# Patient Record
Sex: FEMALE | Race: WHITE | NOT HISPANIC OR LATINO | Employment: FULL TIME | URBAN - METROPOLITAN AREA
[De-identification: names, ages, dates, MRNs, and addresses within clinical notes are randomized per-mention and may not be internally consistent; named-entity substitution may affect disease eponyms.]

---

## 2017-01-30 ENCOUNTER — OFFICE VISIT (OUTPATIENT)
Dept: FAMILY MEDICINE | Facility: CLINIC | Age: 52
End: 2017-01-30
Payer: COMMERCIAL

## 2017-01-30 VITALS
WEIGHT: 136 LBS | BODY MASS INDEX: 20.14 KG/M2 | HEART RATE: 76 BPM | HEIGHT: 69 IN | SYSTOLIC BLOOD PRESSURE: 94 MMHG | TEMPERATURE: 98 F | DIASTOLIC BLOOD PRESSURE: 62 MMHG | OXYGEN SATURATION: 96 %

## 2017-01-30 DIAGNOSIS — F32.5 MAJOR DEPRESSION IN COMPLETE REMISSION (H): Primary | ICD-10-CM

## 2017-01-30 DIAGNOSIS — F41.9 ANXIETY: ICD-10-CM

## 2017-01-30 DIAGNOSIS — F99 INSOMNIA DUE TO OTHER MENTAL DISORDER: ICD-10-CM

## 2017-01-30 DIAGNOSIS — F51.05 INSOMNIA DUE TO OTHER MENTAL DISORDER: ICD-10-CM

## 2017-01-30 PROCEDURE — 99213 OFFICE O/P EST LOW 20 MIN: CPT | Performed by: FAMILY MEDICINE

## 2017-01-30 RX ORDER — CLONAZEPAM 0.5 MG/1
0.5 TABLET ORAL
Qty: 30 TABLET | Refills: 0 | Status: SHIPPED | OUTPATIENT
Start: 2017-01-30 | End: 2017-03-08

## 2017-01-30 RX ORDER — SERTRALINE HYDROCHLORIDE 100 MG/1
100 TABLET, FILM COATED ORAL DAILY
Qty: 90 TABLET | Refills: 1 | Status: SHIPPED | OUTPATIENT
Start: 2017-01-30 | End: 2017-08-18

## 2017-01-30 NOTE — NURSING NOTE
"Chief Complaint   Patient presents with     Sleep Problem     Depression     Health Maintenance     jono,phq       Initial BP 94/62 mmHg  Pulse 76  Temp(Src) 98  F (36.7  C) (Oral)  Ht 5' 9\" (1.753 m)  Wt 136 lb (61.689 kg)  BMI 20.07 kg/m2  SpO2 96%  LMP 08/13/2014  Breastfeeding? No Estimated body mass index is 20.07 kg/(m^2) as calculated from the following:    Height as of this encounter: 5' 9\" (1.753 m).    Weight as of this encounter: 136 lb (61.689 kg).  BP completed using cuff size: regular  Alessandra Bess CMA       "

## 2017-01-30 NOTE — PROGRESS NOTES
"  SUBJECTIVE:                                                    Sharita Joseph is a 51 year old female who presents to clinic today for the following health issues:    Depression and Anxiety Follow-Up    Status since last visit: Worsened More issues with sleep    Other associated symptoms:None    Complicating factors:     Significant life event: No     Current substance abuse: None    PHQ-9 SCORE 11/9/2015 5/27/2016 11/18/2016   Total Score - - -   Total Score 2 0 9     LUDIN-7 SCORE 2/12/2015 2/23/2015 11/18/2016   Total Score 11 6 -   Total Score - - 18        PHQ-9  English      PHQ-9   Any Language     GAD7         Amount of exercise or physical activity: 6-7 days/week for an average of 45-60 minutes    Problems taking medications regularly: No    Medication side effects: none    Diet: carbohydrate counting and gluten-free/reduced    She went up on on her zoloft to 75 mg daily. Somewhat helps.     Life is very imbalanced.   Working 7 days/ week.   Stress at work.   Unable to adjust her work schedule.   If she does not get good night sleep then her depression gets worse.   Anxiety makes hard to go to bed.   It affects her next day performance.     She started doing light therapy.   She is doing meditation and yoga.     Problem list and histories reviewed & adjusted, as indicated.  Additional history: as documented    BP Readings from Last 3 Encounters:   01/30/17 94/62   12/20/16 97/61   11/18/16 105/67    Wt Readings from Last 3 Encounters:   01/30/17 136 lb (61.689 kg)   11/18/16 134 lb (60.782 kg)   07/25/16 135 lb (61.236 kg)            Problem list, Medication list, Allergies, and Medical/Social/Surgical histories reviewed in EPIC and updated as appropriate.    ROS:  Constitutional, HEENT, cardiovascular, pulmonary, gi and gu systems are negative, except as otherwise noted.    OBJECTIVE:                                                    BP 94/62 mmHg  Pulse 76  Temp(Src) 98  F (36.7  C) (Oral)  Ht 5' 9\" " (1.753 m)  Wt 136 lb (61.689 kg)  BMI 20.07 kg/m2  SpO2 96%  LMP 08/13/2014  Breastfeeding? No  Body mass index is 20.07 kg/(m^2).  GENERAL: healthy, alert and no distress  PSYCH: alert, oriented, well groomed, normal thought process, mood euthymic, affect congruent to mood.     PHQ-9 SCORE 5/27/2016 11/18/2016 1/30/2017   Total Score - - -   Total Score 0 9 14          ASSESSMENT/PLAN:                                                        ICD-10-CM    1. Major depression in complete remission (H) F32.5 sertraline (ZOLOFT) 100 MG tablet   2. Anxiety F41.9 clonazePAM (KLONOPIN) 0.5 MG tablet   3. Insomnia due to other mental disorder F51.05 clonazePAM (KLONOPIN) 0.5 MG tablet    F99      Dose of zoloft increased to 100 mg daily.   Pt has needed Klonopin on as needed basis in the past. No concern about abuse or misuse of medication.   Short supply of Klonopin, pt will limit the use to 3-4 nights per week.   Continue meditation, yoga, light therapy.   F/u in 1 month for depression.     Arlette Ramirez MD  [facility]

## 2017-01-30 NOTE — MR AVS SNAPSHOT
"              After Visit Summary   1/30/2017    Sharita Joseph    MRN: [id number]           Patient Information     Date Of Birth          1965        Visit Information        Provider Department      1/30/2017 1:40 PM Arlette Ramirez MD [facility]        Today's Diagnoses     Major depression in complete remission (H)    -  1     Anxiety         Insomnia due to other mental disorder            Follow-ups after your visit        Who to contact     If you have questions or need follow up information about today's clinic visit or your schedule please contact [facility] directly at [phone].  Normal or non-critical lab and imaging results will be communicated to you by MyChart, letter or phone within 4 business days after the clinic has received the results. If you do not hear from us within 7 days, please contact the clinic through [employer] or phone. If you have a critical or abnormal lab result, we will notify you by phone as soon as possible.  Submit refill requests through [employer] or call your pharmacy and they will forward the refill request to us. Please allow 3 business days for your refill to be completed.          Additional Information About Your Visit        MyChart Information     [employer] gives you secure access to your electronic health record. If you see a primary care provider, you can also send messages to your care team and make appointments. If you have questions, please call your primary care clinic.  If you do not have a primary care provider, please call [phone] and they will assist you.        Care EveryWhere ID     This is your Care EveryWhere ID. This could be used by other organizations to access your [facility] medical records  [id number]        Your Vitals Were     Pulse Temperature Height BMI (Body Mass Index) Pulse Oximetry Last Period    76 98  F (36.7  C) (Oral) 5' 9\" (1.753 m) 20.07 kg/m2 96% 08/13/2014    " Breastfeeding?                   No            Blood Pressure from Last 3 Encounters:   01/30/17 94/62   12/20/16 97/61   11/18/16 105/67    Weight from Last 3 Encounters:   01/30/17 136 lb (61.689 kg)   11/18/16 134 lb (60.782 kg)   07/25/16 135 lb (61.236 kg)              Today, you had the following     No orders found for display         Today's Medication Changes          These changes are accurate as of: 1/30/17  2:10 PM.  If you have any questions, ask your nurse or doctor.               These medicines have changed or have updated prescriptions.        Dose/Directions    sertraline 100 MG tablet   Commonly known as:  ZOLOFT   This may have changed:    - medication strength  - how much to take   Used for:  Major depression in complete remission (H)   Changed by:  Arlette Ramirez MD        Dose:  100 mg   Take 1 tablet (100 mg) by mouth daily   Quantity:  90 tablet   Refills:  1            Where to get your medicines      These medications were sent to [id number] - [place], [place] - [address] AT [facility] [address]  [address], [place] [address]     Phone:  [phone]    - sertraline 100 MG tablet      Some of these will need a paper prescription and others can be bought over the counter.  Ask your nurse if you have questions.     Bring a paper prescription for each of these medications    - clonazePAM 0.5 MG tablet             Primary Care Provider Office Phone # Fax #    Arlette Ramirez [phone]       [facility] [address] [facility] [address]        Thank you!     Thank you for choosing [facility]  for your care. Our goal is always to provide you with excellent care. Hearing back from our patients is one way we can continue to improve our services. Please take a few minutes to complete the written survey that you may receive in the mail after your visit with us.  Thank you!             Your Updated Medication List - Protect others around you: Learn how to safely use, store and throw away your medicines at www.disposemymeds.org.          This list is accurate as of: 1/30/17  2:10 PM.  Always use your most recent med list.                   Brand Name Dispense Instructions for use    clindamycin 1 % solution    CLEOCIN T    60 mL    Apply topically 2 times daily       clonazePAM 0.5 MG tablet    klonoPIN    30 tablet    Take 1 tablet (0.5 mg) by mouth nightly as needed for anxiety       conjugated estrogens cream    PREMARIN     Place vaginally twice a week       doxycycline 100 MG capsule    VIBRAMYCIN    60 capsule    Take 1 capsule (100 mg) by mouth daily       magnesium 250 MG tablet     100 tablet    Take 1 tablet by mouth daily.       pimecrolimus 1 % cream    ELIDEL    60 g    Apply topically 2 times daily       sertraline 100 MG tablet    ZOLOFT    90 tablet    Take 1 tablet (100 mg) by mouth daily       triamcinolone 0.1 % cream    KENALOG    80 g    Apply sparingly to affected area three times daily for 14 days.       VITAMIN B COMPLEX PO      Take  by mouth.

## 2017-01-31 ASSESSMENT — PATIENT HEALTH QUESTIONNAIRE - PHQ9: SUM OF ALL RESPONSES TO PHQ QUESTIONS 1-9: 14

## 2017-02-20 ENCOUNTER — MYC MEDICAL ADVICE (OUTPATIENT)
Dept: FAMILY MEDICINE | Facility: CLINIC | Age: 52
End: 2017-02-20

## 2017-02-20 NOTE — TELEPHONE ENCOUNTER
Routed patient's Mychart update regarding her sertraline and clonazepam use to Dr. Ramirez as SEB.  Sharita Jasmine RN  [facility]

## 2017-03-08 DIAGNOSIS — F99 INSOMNIA DUE TO OTHER MENTAL DISORDER: ICD-10-CM

## 2017-03-08 DIAGNOSIS — F41.9 ANXIETY: ICD-10-CM

## 2017-03-08 DIAGNOSIS — F51.05 INSOMNIA DUE TO OTHER MENTAL DISORDER: ICD-10-CM

## 2017-03-08 RX ORDER — CLONAZEPAM 0.5 MG/1
0.5 TABLET ORAL
Qty: 30 TABLET | Refills: 0 | Status: SHIPPED | OUTPATIENT
Start: 2017-03-08 | End: 2017-04-14

## 2017-03-08 NOTE — TELEPHONE ENCOUNTER
clonazePAM (KLONOPIN) 0.5 MG tablet      Last Written Prescription Date:  01/30/2017  Last Fill Quantity: 30,   # refills: 0  Last Office Visit with [facility], [facility] or [facility] prescribing provider: 01/30/2017  Future Office visit:       Routing refill request to provider for review/approval because:  Drug not on the [facility], [facility] or [facility] refill protocol or controlled substance

## 2017-03-09 NOTE — TELEPHONE ENCOUNTER
Prescription printed, in team 2 TC's basket.     Arlette Ramirez MD.   Family Physician.  [facility].

## 2017-04-14 DIAGNOSIS — F99 INSOMNIA DUE TO OTHER MENTAL DISORDER: ICD-10-CM

## 2017-04-14 DIAGNOSIS — F51.05 INSOMNIA DUE TO OTHER MENTAL DISORDER: ICD-10-CM

## 2017-04-14 DIAGNOSIS — F41.9 ANXIETY: ICD-10-CM

## 2017-04-14 RX ORDER — CLONAZEPAM 0.5 MG/1
0.5 TABLET ORAL
Qty: 30 TABLET | Refills: 0 | Status: SHIPPED | OUTPATIENT
Start: 2017-04-14 | End: 2017-05-10

## 2017-04-14 NOTE — TELEPHONE ENCOUNTER
Clonazepam (KLONOPIN) 0.5 MG tablet      Last Written Prescription Date:  03/08/2017  Last Fill Quantity: 30,   # refills: 0  Last Office Visit with [facility] or [facility] prescribing provider: 01/30/2017  Future Office visit:       Routing refill request to provider for review/approval because:  Drug not on the [facility], [facility] or [facility] refill protocol or controlled substance

## 2017-04-14 NOTE — TELEPHONE ENCOUNTER
Prescription approved, in team 2 TC's basket.     Arlette Ramirez MD.   Family Physician.  [facility].

## 2017-04-14 NOTE — TELEPHONE ENCOUNTER
Patient wanted provider to know that she has tried melatonin and still has been unable to sleep for three days. MODE Calvert

## 2017-04-17 RX ORDER — CLONAZEPAM 0.5 MG/1
TABLET ORAL
Qty: 30 TABLET | Refills: 0 | OUTPATIENT
Start: 2017-04-17

## 2017-04-17 NOTE — TELEPHONE ENCOUNTER
prescription was approved on 04/14th and kept in team 2 TC's basket.     Arlette Ramirez MD.   Family Physician.  [facility].

## 2017-04-17 NOTE — TELEPHONE ENCOUNTER
clonazePAM (KLONOPIN) 0.5 MG tablet      Last Written Prescription Date:  4/14/17  Last Fill Quantity: 30,   # refills: 0  Last Office Visit with [facility], [facility] or [facility] prescribing provider: 1/30/17  Future Office visit:       Routing refill request to provider for review/approval because:  Drug not on the [facility], [facility] or [facility] refill protocol or controlled substance

## 2017-05-10 ENCOUNTER — OFFICE VISIT (OUTPATIENT)
Dept: FAMILY MEDICINE | Facility: CLINIC | Age: 52
End: 2017-05-10
Payer: COMMERCIAL

## 2017-05-10 VITALS
OXYGEN SATURATION: 96 % | HEART RATE: 65 BPM | TEMPERATURE: 98.1 F | BODY MASS INDEX: 19.79 KG/M2 | DIASTOLIC BLOOD PRESSURE: 55 MMHG | WEIGHT: 134 LBS | SYSTOLIC BLOOD PRESSURE: 91 MMHG

## 2017-05-10 DIAGNOSIS — F41.9 ANXIETY: ICD-10-CM

## 2017-05-10 DIAGNOSIS — F99 INSOMNIA DUE TO OTHER MENTAL DISORDER: ICD-10-CM

## 2017-05-10 DIAGNOSIS — K52.9 CHRONIC DIARRHEA: Primary | ICD-10-CM

## 2017-05-10 DIAGNOSIS — F51.05 INSOMNIA DUE TO OTHER MENTAL DISORDER: ICD-10-CM

## 2017-05-10 DIAGNOSIS — R10.32 ABDOMINAL PAIN, LEFT LOWER QUADRANT: ICD-10-CM

## 2017-05-10 LAB
ALBUMIN SERPL-MCNC: 3.9 G/DL (ref 3.4–5)
ALP SERPL-CCNC: 82 U/L (ref 40–150)
ALT SERPL W P-5'-P-CCNC: 39 U/L (ref 0–50)
ANION GAP SERPL CALCULATED.3IONS-SCNC: 10 MMOL/L (ref 3–14)
AST SERPL W P-5'-P-CCNC: 58 U/L (ref 0–45)
BILIRUB SERPL-MCNC: 0.4 MG/DL (ref 0.2–1.3)
BUN SERPL-MCNC: 16 MG/DL (ref 7–30)
CALCIUM SERPL-MCNC: 9.4 MG/DL (ref 8.5–10.1)
CHLORIDE SERPL-SCNC: 103 MMOL/L (ref 94–109)
CO2 SERPL-SCNC: 28 MMOL/L (ref 20–32)
CREAT SERPL-MCNC: 0.96 MG/DL (ref 0.52–1.04)
CRP SERPL-MCNC: <2.9 MG/L (ref 0–8)
ERYTHROCYTE [DISTWIDTH] IN BLOOD BY AUTOMATED COUNT: 12.8 % (ref 10–15)
ERYTHROCYTE [SEDIMENTATION RATE] IN BLOOD BY WESTERGREN METHOD: 6 MM/H (ref 0–30)
GFR SERPL CREATININE-BSD FRML MDRD: 62 ML/MIN/1.7M2
GLUCOSE SERPL-MCNC: 91 MG/DL (ref 70–99)
HCT VFR BLD AUTO: 41.8 % (ref 35–47)
HGB BLD-MCNC: 13.8 G/DL (ref 11.7–15.7)
MCH RBC QN AUTO: 30.5 PG (ref 26.5–33)
MCHC RBC AUTO-ENTMCNC: 33 G/DL (ref 31.5–36.5)
MCV RBC AUTO: 92 FL (ref 78–100)
PLATELET # BLD AUTO: 191 10E9/L (ref 150–450)
POTASSIUM SERPL-SCNC: 4.7 MMOL/L (ref 3.4–5.3)
PROT SERPL-MCNC: 7 G/DL (ref 6.8–8.8)
RBC # BLD AUTO: 4.53 10E12/L (ref 3.8–5.2)
SODIUM SERPL-SCNC: 141 MMOL/L (ref 133–144)
WBC # BLD AUTO: 4 10E9/L (ref 4–11)

## 2017-05-10 PROCEDURE — 85027 COMPLETE CBC AUTOMATED: CPT | Performed by: FAMILY MEDICINE

## 2017-05-10 PROCEDURE — 99214 OFFICE O/P EST MOD 30 MIN: CPT | Performed by: FAMILY MEDICINE

## 2017-05-10 PROCEDURE — 80053 COMPREHEN METABOLIC PANEL: CPT | Performed by: FAMILY MEDICINE

## 2017-05-10 PROCEDURE — 85652 RBC SED RATE AUTOMATED: CPT | Performed by: FAMILY MEDICINE

## 2017-05-10 PROCEDURE — 86140 C-REACTIVE PROTEIN: CPT | Performed by: FAMILY MEDICINE

## 2017-05-10 PROCEDURE — 36415 COLL VENOUS BLD VENIPUNCTURE: CPT | Performed by: FAMILY MEDICINE

## 2017-05-10 RX ORDER — CLONAZEPAM 0.5 MG/1
0.5 TABLET ORAL
Qty: 30 TABLET | Refills: 0 | Status: SHIPPED | OUTPATIENT
Start: 2017-05-15 | End: 2017-07-12

## 2017-05-10 NOTE — MR AVS SNAPSHOT
After Visit Summary   5/10/2017    Sharita Joseph    MRN: [id number]           Patient Information     Date Of Birth          1965        Visit Information        Provider Department      5/10/2017 2:20 PM Arlette Ramirez MD [facility]        Today's Diagnoses     Chronic diarrhea    -  1    Abdominal pain, left lower quadrant        Anxiety        Insomnia due to other mental disorder           Follow-ups after your visit        Additional Services     GASTROENTEROLOGY ADULT REF CONSULT ONLY       Preferred Location: [place] [facility] [phone]      Please be aware that coverage of these services is subject to the terms and limitations of your health insurance plan.  Call member services at your health plan with any benefit or coverage questions.  Any procedures must be performed at a [facility] facility OR coordinated by your clinic's referral office.    Please bring the following with you to your appointment:    (1) Any X-Rays, CTs or MRIs which have been performed.  Contact the facility where they were done to arrange for  prior to your scheduled appointment.    (2) List of current medications   (3) This referral request   (4) Any documents/labs given to you for this referral                  Future tests that were ordered for you today     Open Future Orders        Priority Expected Expires Ordered    Enteric Bacteria and Virus Panel by MACARIO Stool Routine  5/10/2018 5/10/2017    Fecal Lactoferrin Routine  5/10/2018 5/10/2017            Who to contact     If you have questions or need follow up information about today's clinic visit or your schedule please contact [facility] directly at [phone].  Normal or non-critical lab and imaging results will be communicated to you by MyChart, letter or phone within 4 business days after the clinic has received the results. If you do not hear from us within 7 days, please contact the clinic  through [employer] or phone. If you have a critical or abnormal lab result, we will notify you by phone as soon as possible.  Submit refill requests through [employer] or call your pharmacy and they will forward the refill request to us. Please allow 3 business days for your refill to be completed.          Additional Information About Your Visit        [employer] Information     [employer] gives you secure access to your electronic health record. If you see a primary care provider, you can also send messages to your care team and make appointments. If you have questions, please call your primary care clinic.  If you do not have a primary care provider, please call [phone] and they will assist you.        Care EveryWhere ID     This is your Care EveryWhere ID. This could be used by other organizations to access your [facility] medical records  [id number]        Your Vitals Were     Pulse Temperature Last Period Pulse Oximetry BMI (Body Mass Index)       65 98.1  F (36.7  C) (Oral) 08/13/2014 96% 19.79 kg/m2        Blood Pressure from Last 3 Encounters:   05/10/17 91/55   01/30/17 94/62   12/20/16 97/61    Weight from Last 3 Encounters:   05/10/17 134 lb (60.8 kg)   01/30/17 136 lb (61.7 kg)   11/18/16 134 lb (60.8 kg)              We Performed the Following     CBC with platelets     Comprehensive metabolic panel (BMP + Alb, Alk Phos, ALT, AST, Total. Bili, TP)     CRP, inflammation     ESR: Erythrocyte sedimentation rate     GASTROENTEROLOGY ADULT REF CONSULT ONLY          Where to get your medicines      Some of these will need a paper prescription and others can be bought over the counter.  Ask your nurse if you have questions.     Bring a paper prescription for each of these medications     clonazePAM 0.5 MG tablet          Primary Care Provider Office Phone # Fax #    Arlette Ramirez [phone]       [facility] [address] [facility] [address]        Thank you!      Thank you for choosing [facility]  for your care. Our goal is always to provide you with excellent care. Hearing back from our patients is one way we can continue to improve our services. Please take a few minutes to complete the written survey that you may receive in the mail after your visit with us. Thank you!             Your Updated Medication List - Protect others around you: Learn how to safely use, store and throw away your medicines at www.disposemymeds.org.          This list is accurate as of: 5/10/17  3:14 PM.  Always use your most recent med list.                   Brand Name Dispense Instructions for use    clonazePAM 0.5 MG tablet   Start taking on:  5/15/2017    klonoPIN    30 tablet    Take 1 tablet (0.5 mg) by mouth nightly as needed for anxiety       conjugated estrogens cream    PREMARIN     Place vaginally twice a week       magnesium 250 MG tablet     100 tablet    Take 1 tablet by mouth daily.       sertraline 100 MG tablet    ZOLOFT    90 tablet    Take 1 tablet (100 mg) by mouth daily       triamcinolone 0.1 % cream    KENALOG    80 g    Apply sparingly to affected area three times daily for 14 days.       VITAMIN B COMPLEX PO      Take  by mouth.

## 2017-05-10 NOTE — PROGRESS NOTES
SUBJECTIVE:                                                    Sharita Joseph is a 51 year old female who presents to clinic today for the following health issues:    Frequent bowel movement,no diarrhea, sometimes abdominal pain on L side x years     Diarrhea, on and off , not new , seen by GI in . Had complete w/u done per pt including colonoscopy. Everything was normal.   She was advised to make dietary changes.     Since last Dec she has been having diarrhea more often than usual. Watery to semisolid. Had to go 8 -10 times per day.   Those symptoms got somewhat better on its own.   In March she had left lower abdominal pain with episodes of diarrhea again. Pain has improved however not completley resolved.   She continues to had dull- sharp , nonradiating pain in that area.    No n/v. Denies Gurinary symptoms.     She wants to know what can be done for her current episode and what kind of diet she can have if it is diarrhea predominant IBS.     Needs refill on her anxiety medications.       Problem list and histories reviewed & adjusted, as indicated.  Additional history: as documented    Patient Active Problem List   Diagnosis     Nonspecific abnormal results of liver function study     Allergic rhinitis due to other allergen     Premenstrual tension syndrome     Other chronic allergic conjunctivitis     Chronic rhinitis     Excessive or frequent menstruation     CARDIOVASCULAR SCREENING; LDL GOAL LESS THAN 160     Anxiety     Health Care Home     Major depression in complete remission (H)     DUB (dysfunctional uterine bleeding)     Vaginal burning     Dermal nevus     Epidermoid cyst of skin     Seborrheic keratosis     FH: breast cancer     Past Surgical History:   Procedure Laterality Date     C/SECTION, LOW TRANSVERSE      , Low Transverse     D & C       DILATION AND CURETTAGE, HYSTEROSCOPY DIAGNOSTIC, COMBINED  2014    Procedure: COMBINED DILATION AND CURETTAGE, HYSTEROSCOPY  DIAGNOSTIC;  Surgeon: Cal Covington MD;  Location: [facility] OR     ENDOSCOPY       TONSILLECTOMY         Social History   Substance Use Topics     Smoking status: Former Smoker     Quit date: 2000     Smokeless tobacco: Never Used     Alcohol use Yes      Comment: rare     Family History   Problem Relation Age of Onset     OSTEOPOROSIS Mother      Breast Cancer Mother      Dx over 20 years ago, age 46     Arthritis Mother      Psychotic Disorder Mother      HEART DISEASE Mother      Lipids Father      Hypertension Father      CEREBROVASCULAR DISEASE Father      HEART DISEASE Father      CANCER Maternal Grandfather      lung, 25 years ago     Alzheimer Disease Paternal Grandmother      possible     Cancer - colorectal Paternal Grandfather      age of dx ?     Obesity Brother      Hypertension Brother      Melanoma No family hx of          BP Readings from Last 3 Encounters:   05/10/17 91/55   17 94/62   16 97/61    Wt Readings from Last 3 Encounters:   05/10/17 134 lb (60.8 kg)   17 136 lb (61.7 kg)   16 134 lb (60.8 kg)                    Reviewed and updated as needed this visit by clinical staff       Reviewed and updated as needed this visit by Provider         ROS:  Constitutional, HEENT, cardiovascular, pulmonary, gi and gu systems are negative, except as otherwise noted.    OBJECTIVE:                                                    BP 91/55  Pulse 65  Temp 98.1  F (36.7  C) (Oral)  Wt 134 lb (60.8 kg)  LMP 2014  SpO2 96%  BMI 19.79 kg/m2  Body mass index is 19.79 kg/(m^2).  GENERAL: healthy, alert and no distress  NECK: no adenopathy, no asymmetry, masses, or scars and thyroid normal to palpation  RESP: lungs clear to auscultation - no rales, rhonchi or wheezes  CV: regular rate and rhythm, normal S1 S2, no S3 or S4, no murmur, click or rub, no peripheral edema and peripheral pulses strong  ABDOMEN: soft, mild tenderness LLQ abdomen w/o guarding or rigidity,  no hepatosplenomegaly, no masses and bowel sounds normal  MS: no gross musculoskeletal defects noted, no edema     ASSESSMENT/PLAN:                                                        ICD-10-CM    1. Chronic diarrhea K52.9 CBC with platelets     ESR: Erythrocyte sedimentation rate     CRP, inflammation     Enteric Bacteria and Virus Panel by [facility]ARIO Stool     Fecal Lactoferrin     Comprehensive metabolic panel (BMP + Alb, Alk Phos, ALT, AST, Total. Bili, TP)     GASTROENTEROLOGY ADULT REF CONSULT ONLY   2. Abdominal pain, left lower quadrant R10.32 CBC with platelets     ESR: Erythrocyte sedimentation rate     CRP, inflammation     Comprehensive metabolic panel (BMP + Alb, Alk Phos, ALT, AST, Total. Bili, TP)     GASTROENTEROLOGY ADULT REF CONSULT ONLY   3. Anxiety F41.9 clonazePAM (KLONOPIN) 0.5 MG tablet   4. Insomnia due to other mental disorder F51.05 clonazePAM (KLONOPIN) 0.5 MG tablet    F99      Chronic diarrhea:   - FODMAP diet instructions printed out for pt.   - see GI.     Recent worsening of diarrhea with LLQ pain:   - labs as above.   - if negative, see GI.  - may need to f/u with GYN if pain does not improve and if GI pathology is ruled out.     Arlette Ramirez MD  [facility]

## 2017-05-10 NOTE — NURSING NOTE
"Chief Complaint   Patient presents with     Patient Request     frequent bowel movement,no diarrhea        Initial BP 91/55  Pulse 65  Temp 98.1  F (36.7  C) (Oral)  Wt 134 lb (60.8 kg)  LMP 08/13/2014  SpO2 96%  BMI 19.79 kg/m2 Estimated body mass index is 19.79 kg/(m^2) as calculated from the following:    Height as of 1/30/17: 5' 9\" (1.753 m).    Weight as of this encounter: 134 lb (60.8 kg).  Medication Reconciliation: complete  Lashaun Silva MA    "

## 2017-05-11 DIAGNOSIS — K52.9 CHRONIC DIARRHEA: ICD-10-CM

## 2017-05-11 PROCEDURE — 87506 IADNA-DNA/RNA PROBE TQ 6-11: CPT | Performed by: FAMILY MEDICINE

## 2017-05-11 NOTE — PROGRESS NOTES
Sharita,     Your recent blood work up looks GOOD!     Arlette Ramirez MD.   Family Physician.  [facility].

## 2017-05-12 ENCOUNTER — TELEPHONE (OUTPATIENT)
Dept: FAMILY MEDICINE | Facility: CLINIC | Age: 52
End: 2017-05-12

## 2017-05-12 DIAGNOSIS — A03.9 SHIGELLOSIS, UNSPECIFIED: Primary | ICD-10-CM

## 2017-05-12 LAB
CAMPYLOBACTER GROUP BY NAT: NOT DETECTED
ENTERIC PATHOGEN COMMENT: ABNORMAL
LACTOFERRIN STL QL IA: ABNORMAL
NOROVIRUS I AND II BY NAT: NOT DETECTED
ROTAVIRUS A BY NAT: NOT DETECTED
SALMONELLA SPECIES BY NAT: NOT DETECTED
SHIGA TOXIN 1 GENE BY NAT: ABNORMAL
SHIGA TOXIN 2 GENE BY NAT: NOT DETECTED
SHIGELLA SP+EIEC IPAH STL QL NAA+PROBE: NOT DETECTED
VIBRIO GROUP BY NAT: NOT DETECTED
YERSINIA ENTEROCOLITICA BY NAT: NOT DETECTED

## 2017-05-12 RX ORDER — CIPROFLOXACIN 500 MG/1
500 TABLET, FILM COATED ORAL 2 TIMES DAILY
Qty: 6 TABLET | Refills: 0 | Status: SHIPPED | OUTPATIENT
Start: 2017-05-12 | End: 2017-06-12

## 2017-05-12 NOTE — PROGRESS NOTES
See phone note.     Arlette Ramirez MD.   Family Physician.  [facility].

## 2017-05-12 NOTE — TELEPHONE ENCOUNTER
Called up and stool test result discussed with pt.   Rx sent for Ciprofloxacin 500 mg BID x 3 days.     F/u with GI if symptoms do not improve.     Arlette Ramirez MD.   Family Physician.  [facility].

## 2017-05-18 ENCOUNTER — TELEPHONE (OUTPATIENT)
Dept: FAMILY MEDICINE | Facility: CLINIC | Age: 52
End: 2017-05-18

## 2017-05-18 NOTE — TELEPHONE ENCOUNTER
Reason for Call:  Needing information    Detailed comments:   1) was patient hospitalized for her Ecoli infection?  2) what was the date of her lab when it was positive?  3) was a Rx prescribed?  4) Phone number for patient.    Phone Number Patient can be reached at:   [facility] [phone]     Ok to speak to anyone and leave a message.    Best Time: anytime    Can we leave a detailed message on this number? YES    Call taken on 5/18/2017 at 1:05 PM by Yaa Kelly

## 2017-05-19 NOTE — TELEPHONE ENCOUNTER
Vero   [facility] [phone]     Left message on voicemail to return phone call to triage.  Sariah Escobar RN [facility] Triage.

## 2017-05-22 NOTE — TELEPHONE ENCOUNTER
Called [phone].  Left message on voicemail to return call to triage.  Sariah Escobar RN [facility] Triage.

## 2017-05-23 NOTE — TELEPHONE ENCOUNTER
Called tuyet at the number below.  Left message on voicemail to return call to triage.  Sariah Escobar RN [facility] Triage.

## 2017-05-25 NOTE — TELEPHONE ENCOUNTER
YANY/SIENA Ahumada at [place] dept of health number to call nurse line.     Humera Gifford RN  [facility]

## 2017-05-25 NOTE — TELEPHONE ENCOUNTER
Fadia from [facility] called back, I advised her that the positive test was 5/12/17, cipro Rx sent 5/12/17 and patient notified.   Does not appear patient was hospitalized.  Provided phone number.  Sharita Jasmine RN  [facility]

## 2017-06-08 ENCOUNTER — OFFICE VISIT (OUTPATIENT)
Dept: FAMILY MEDICINE | Facility: CLINIC | Age: 52
End: 2017-06-08
Payer: COMMERCIAL

## 2017-06-08 ENCOUNTER — NURSE TRIAGE (OUTPATIENT)
Dept: NURSING | Facility: CLINIC | Age: 52
End: 2017-06-08

## 2017-06-08 VITALS
BODY MASS INDEX: 19.94 KG/M2 | TEMPERATURE: 98.3 F | SYSTOLIC BLOOD PRESSURE: 102 MMHG | OXYGEN SATURATION: 97 % | WEIGHT: 131.6 LBS | DIASTOLIC BLOOD PRESSURE: 70 MMHG | HEART RATE: 75 BPM | HEIGHT: 68 IN

## 2017-06-08 DIAGNOSIS — R51.9 NONINTRACTABLE HEADACHE, UNSPECIFIED CHRONICITY PATTERN, UNSPECIFIED HEADACHE TYPE: Primary | ICD-10-CM

## 2017-06-08 LAB
ALBUMIN SERPL-MCNC: 4.3 G/DL (ref 3.4–5)
ALP SERPL-CCNC: 80 U/L (ref 40–150)
ALT SERPL W P-5'-P-CCNC: 36 U/L (ref 0–50)
ANION GAP SERPL CALCULATED.3IONS-SCNC: 7 MMOL/L (ref 3–14)
AST SERPL W P-5'-P-CCNC: 54 U/L (ref 0–45)
BASOPHILS # BLD AUTO: 0 10E9/L (ref 0–0.2)
BASOPHILS NFR BLD AUTO: 0.5 %
BILIRUB SERPL-MCNC: 0.4 MG/DL (ref 0.2–1.3)
BUN SERPL-MCNC: 14 MG/DL (ref 7–30)
CALCIUM SERPL-MCNC: 9.2 MG/DL (ref 8.5–10.1)
CHLORIDE SERPL-SCNC: 103 MMOL/L (ref 94–109)
CO2 SERPL-SCNC: 31 MMOL/L (ref 20–32)
CREAT SERPL-MCNC: 0.86 MG/DL (ref 0.52–1.04)
DIFFERENTIAL METHOD BLD: NORMAL
EOSINOPHIL # BLD AUTO: 0 10E9/L (ref 0–0.7)
EOSINOPHIL NFR BLD AUTO: 0.2 %
ERYTHROCYTE [DISTWIDTH] IN BLOOD BY AUTOMATED COUNT: 12.7 % (ref 10–15)
ERYTHROCYTE [SEDIMENTATION RATE] IN BLOOD BY WESTERGREN METHOD: 7 MM/H (ref 0–30)
GFR SERPL CREATININE-BSD FRML MDRD: 70 ML/MIN/1.7M2
GLUCOSE SERPL-MCNC: 93 MG/DL (ref 70–99)
HCT VFR BLD AUTO: 42 % (ref 35–47)
HGB BLD-MCNC: 14.3 G/DL (ref 11.7–15.7)
LYMPHOCYTES # BLD AUTO: 1.4 10E9/L (ref 0.8–5.3)
LYMPHOCYTES NFR BLD AUTO: 31.4 %
MCH RBC QN AUTO: 31 PG (ref 26.5–33)
MCHC RBC AUTO-ENTMCNC: 34 G/DL (ref 31.5–36.5)
MCV RBC AUTO: 91 FL (ref 78–100)
MONOCYTES # BLD AUTO: 0.4 10E9/L (ref 0–1.3)
MONOCYTES NFR BLD AUTO: 8.1 %
NEUTROPHILS # BLD AUTO: 2.6 10E9/L (ref 1.6–8.3)
NEUTROPHILS NFR BLD AUTO: 59.8 %
PLATELET # BLD AUTO: 218 10E9/L (ref 150–450)
POTASSIUM SERPL-SCNC: 3.9 MMOL/L (ref 3.4–5.3)
PROT SERPL-MCNC: 7.4 G/DL (ref 6.8–8.8)
RBC # BLD AUTO: 4.62 10E12/L (ref 3.8–5.2)
SODIUM SERPL-SCNC: 141 MMOL/L (ref 133–144)
WBC # BLD AUTO: 4.4 10E9/L (ref 4–11)

## 2017-06-08 PROCEDURE — 85652 RBC SED RATE AUTOMATED: CPT | Performed by: PHYSICIAN ASSISTANT

## 2017-06-08 PROCEDURE — 99214 OFFICE O/P EST MOD 30 MIN: CPT | Performed by: PHYSICIAN ASSISTANT

## 2017-06-08 PROCEDURE — 86618 LYME DISEASE ANTIBODY: CPT | Performed by: PHYSICIAN ASSISTANT

## 2017-06-08 PROCEDURE — 80053 COMPREHEN METABOLIC PANEL: CPT | Performed by: PHYSICIAN ASSISTANT

## 2017-06-08 PROCEDURE — 36415 COLL VENOUS BLD VENIPUNCTURE: CPT | Performed by: PHYSICIAN ASSISTANT

## 2017-06-08 PROCEDURE — 85025 COMPLETE CBC W/AUTO DIFF WBC: CPT | Performed by: PHYSICIAN ASSISTANT

## 2017-06-08 RX ORDER — SUMATRIPTAN 50 MG/1
50 TABLET, FILM COATED ORAL
Qty: 9 TABLET | Refills: 0 | Status: SHIPPED | OUTPATIENT
Start: 2017-06-08 | End: 2017-06-12

## 2017-06-08 RX ORDER — ONDANSETRON 4 MG/1
4-8 TABLET, ORALLY DISINTEGRATING ORAL EVERY 8 HOURS PRN
Qty: 20 TABLET | Refills: 0 | Status: SHIPPED | OUTPATIENT
Start: 2017-06-08 | End: 2018-03-21

## 2017-06-08 ASSESSMENT — PAIN SCALES - GENERAL: PAINLEVEL: MODERATE PAIN (4)

## 2017-06-08 NOTE — PROGRESS NOTES
SUBJECTIVE:                                                    Sharita Joseph is a 51 year old female who presents to clinic today for the following health issues:      Headache     Onset: 4 days    Description:   Location: unilateral in the both temporal area   Character: throbbing pain, dull pain, sharp pain  Frequency:  All day  Duration:  unknown    Intensity: mild, moderate, severe    Progression of Symptoms:  worsening    Accompanying Signs & Symptoms:  Stiff neck: YES  Neck or upper back pain: YES  Fever: no   Sinus pressure: YES  Nausea or vomiting: YES  Dizziness: YES  Numbness: no  Weakness: no  Visual changes: no   History:   Head trauma: no  Family history of migraines: no  Previous tests for headaches: no  Neurologist evaluations: no  Able to do daily activities: At times  Wake with a headaches: YES  Do headaches wake you up: YES  Daily pain medication use: YES  Work/school stressors/changes: YES- travel to [place]    Precipitating factors:   Does light make it worse: YES  Does sound make it worse: YES    Alleviating factors:  Does sleep help: YES- It helps but doesnt         Therapies Tried and outcome: Acupuncture  and Ibuprofen (Advil, Motrin)    Monday was in [place] and woke up with the worst pain of her life. Felt like there was a metal poker going through her skull.   Was able to go back to sleep, but when she woke up it was worse.   Tried caffeine, but threw up after that.   Tried ASA- but threw up as well.   Advil controlled the pain the next day.   Had acupuncture yesterday and a massage. Felt like neck pain. Nausea was fine.   This morning had nausea as well. Took 200mg of Advil this morning at 4am and took some magnesium as well. Headache is mostly gone today.   Flying didn't seem to make the HA worse.   No fever measured but had the chills Monday night.     Finished all antibiotics for e. Coli. No diarrhea or stomach complaints.   No vision changes, some dizziness. Feels off balance.   [place]  [place] in [place]. No known bug or tick bites.   No rashes.         Problem list and histories reviewed & adjusted, as indicated.  Additional history: as documented      Reviewed and updated as needed this visit by clinical staff  Tobacco  Allergies  Meds  Med Hx  Surg Hx  Fam Hx  Soc Hx      Reviewed and updated as needed this visit by Provider         ROS:  Constitutional, HEENT, cardiovascular, pulmonary, gi and gu systems are negative, except as otherwise noted.    OBJECTIVE:                                                    /70 (BP Location: Right arm, Patient Position: Chair, Cuff Size: Adult Regular)  Pulse 75  Temp 98.3  F (36.8  C) (Oral)  Ht 5' 8.35\" (1.736 m)  Wt 131 lb 9.6 oz (59.7 kg)  LMP 08/13/2014  SpO2 97%  Breastfeeding? No  BMI 19.81 kg/m2  Body mass index is 19.81 kg/(m^2).  GENERAL: healthy, alert and no distress  EYES: Eyes grossly normal to inspection, PERRL and conjunctivae and sclerae normal, extra occular movements intact   HENT: ear canals and TM's normal, nose and mouth without ulcers or lesions  NECK: no adenopathy, no asymmetry, masses, or scars and thyroid normal to palpation  RESP: lungs clear to auscultation - no rales, rhonchi or wheezes  CV: regular rate and rhythm, normal S1 S2, no S3 or S4, no murmur, click or rub, no peripheral edema and peripheral pulses strong  MS: no gross musculoskeletal defects noted, no edema  SKIN: no suspicious lesions or rashes  NEURO: Normal strength and tone, mentation intact and speech normal, CN II- XII intact, deep tendon reflexes intact, negative Kernig's and Brudzinski's.   PSYCH: mentation appears normal, affect normal/bright    Diagnostic Test Results:  Results for orders placed or performed in visit on 06/08/17   CBC with platelets differential   Result Value Ref Range    WBC 4.4 4.0 - 11.0 10e9/L    RBC Count 4.62 3.8 - 5.2 10e12/L    Hemoglobin 14.3 11.7 - 15.7 g/dL    Hematocrit 42.0 35.0 - 47.0 %    MCV 91 78 - 100 " fl    MCH 31.0 26.5 - 33.0 pg    MCHC 34.0 31.5 - 36.5 g/dL    RDW 12.7 10.0 - 15.0 %    Platelet Count 218 150 - 450 10e9/L    Diff Method Automated Method     % Neutrophils 59.8 %    % Lymphocytes 31.4 %    % Monocytes 8.1 %    % Eosinophils 0.2 %    % Basophils 0.5 %    Absolute Neutrophil 2.6 1.6 - 8.3 10e9/L    Absolute Lymphocytes 1.4 0.8 - 5.3 10e9/L    Absolute Monocytes 0.4 0.0 - 1.3 10e9/L    Absolute Eosinophils 0.0 0.0 - 0.7 10e9/L    Absolute Basophils 0.0 0.0 - 0.2 10e9/L          ASSESSMENT/PLAN:                                                        ICD-10-CM    1. Nonintractable headache, unspecified chronicity pattern, unspecified headache type R51 CBC with platelets differential     Comprehensive metabolic panel     Erythrocyte sedimentation rate auto     Lyme Disease Marisa with reflex to WB Serum     SUMAtriptan (IMITREX) 50 MG tablet     ondansetron (ZOFRAN ODT) 4 MG ODT tab   With recent travel and hiking tick borne disease can not be excluded. If lymes negative recommended repeat in 6 weeks. Try immitrex and zofran as needed. We discussed if HA worsens, new symptoms or any concerns to the ER.     FUTURE APPOINTMENTS:       - Follow-up visit in Monday if HA still present despite treatment.     Wendy Calderón PA-C  [facility]

## 2017-06-08 NOTE — NURSING NOTE
"Chief Complaint   Patient presents with     Headache     Health Maintenance     DAP, Lipid, and Hep C       Initial /70 (BP Location: Right arm, Patient Position: Chair, Cuff Size: Adult Regular)  Pulse 75  Temp 98.3  F (36.8  C) (Oral)  Ht 5' 8.35\" (1.736 m)  Wt 131 lb 9.6 oz (59.7 kg)  LMP 08/13/2014  SpO2 97%  Breastfeeding? No  BMI 19.81 kg/m2 Estimated body mass index is 19.81 kg/(m^2) as calculated from the following:    Height as of this encounter: 5' 8.35\" (1.736 m).    Weight as of this encounter: 131 lb 9.6 oz (59.7 kg).  Medication Reconciliation: modesto Guzmán MA      "

## 2017-06-08 NOTE — TELEPHONE ENCOUNTER
Sharita states she woke 3 days ago with a severe headache. She rubbed Ibu gel on her neck and temple. She then states she vomited. She then took some asprin. Pain then decreased. Wa able to drink and drink since then. SHe saw a accupunctureist. Now she wakes 2 hours ago.   Reason for Disposition    [1] SEVERE headache (e.g., excruciating) AND [2] not improved after 2 hours of pain medicine    Additional Information    Negative: Followed a head injury within last 3 days    Negative: Unable to walk, or can only walk with assistance (e.g., requires support)    Negative: [1] Fever > 100.5 F (38.1 C) AND [2] diabetes mellitus or weak immune system (e.g., HIV positive, cancer chemo, splenectomy, organ transplant, chronic steroids)    Negative: Loss of vision or double vision (Exception: same as prior migraines)    Protocols used: HEADACHE-ADULT-

## 2017-06-08 NOTE — MR AVS SNAPSHOT
"              After Visit Summary   6/8/2017    Sharita Joseph    MRN: [id number]           Patient Information     Date Of Birth          1965        Visit Information        Provider Department      6/8/2017 9:40 AM Wendy Calderón PA-C [facility]        Today's Diagnoses     Nonintractable headache, unspecified chronicity pattern, unspecified headache type    -  1       Follow-ups after your visit        Who to contact     If you have questions or need follow up information about today's clinic visit or your schedule please contact [facility] directly at [phone].  Normal or non-critical lab and imaging results will be communicated to you by MyChart, letter or phone within 4 business days after the clinic has received the results. If you do not hear from us within 7 days, please contact the clinic through [employer] or phone. If you have a critical or abnormal lab result, we will notify you by phone as soon as possible.  Submit refill requests through [employer] or call your pharmacy and they will forward the refill request to us. Please allow 3 business days for your refill to be completed.          Additional Information About Your Visit        MyChart Information     [employer] gives you secure access to your electronic health record. If you see a primary care provider, you can also send messages to your care team and make appointments. If you have questions, please call your primary care clinic.  If you do not have a primary care provider, please call [phone] and they will assist you.        Care EveryWhere ID     This is your Care EveryWhere ID. This could be used by other organizations to access your [facility] medical records  [id number]        Your Vitals Were     Pulse Temperature Height Last Period Pulse Oximetry Breastfeeding?    75 98.3  F (36.8  C) (Oral) 5' 8.35\" (1.736 m) 08/13/2014 97% No    BMI (Body Mass Index)                   19.81 kg/m2    "         Blood Pressure from Last 3 Encounters:   06/08/17 102/70   05/10/17 91/55   01/30/17 94/62    Weight from Last 3 Encounters:   06/08/17 131 lb 9.6 oz (59.7 kg)   05/10/17 134 lb (60.8 kg)   01/30/17 136 lb (61.7 kg)              We Performed the Following     CBC with platelets differential     Comprehensive metabolic panel     Erythrocyte sedimentation rate auto     Lyme Disease Marisa with reflex to WB Serum          Today's Medication Changes          These changes are accurate as of: 6/8/17 10:40 AM.  If you have any questions, ask your nurse or doctor.               Start taking these medicines.        Dose/Directions    ondansetron 4 MG ODT tab   Commonly known as:  ZOFRAN ODT   Used for:  Nonintractable headache, unspecified chronicity pattern, unspecified headache type   Started by:  Wendy Calderón PA-C        Dose:  4-8 mg   Take 1-2 tablets (4-8 mg) by mouth every 8 hours as needed for nausea   Quantity:  20 tablet   Refills:  0       SUMAtriptan 50 MG tablet   Commonly known as:  IMITREX   Used for:  Nonintractable headache, unspecified chronicity pattern, unspecified headache type   Started by:  Wendy Calderón PA-C        Dose:  50 mg   Take 1 tablet (50 mg) by mouth at onset of headache for migraine May repeat in 2 hours. Max 4 tablets/24 hours.   Quantity:  9 tablet   Refills:  0            Where to get your medicines      These medications were sent to [id number] - [place], [place] - [address] AT [facility] [address]  [address] [facility], [place] [address]     Phone:  [phone]     ondansetron 4 MG ODT tab    SUMAtriptan 50 MG tablet                Primary Care Provider Office Phone # Fax #    Arlette Ramirez [phone]       [facility] [address]  [facility] [address]        Thank you!     Thank you for choosing [facility]  for your care. Our goal is always to provide you  with excellent care. Hearing back from our patients is one way we can continue to improve our services. Please take a few minutes to complete the written survey that you may receive in the mail after your visit with us. Thank you!             Your Updated Medication List - Protect others around you: Learn how to safely use, store and throw away your medicines at www.disposemymeds.org.          This list is accurate as of: 6/8/17 10:40 AM.  Always use your most recent med list.                   Brand Name Dispense Instructions for use    ADVIL PO      Take 200 mg by mouth       ciprofloxacin 500 MG tablet    CIPRO    6 tablet    Take 1 tablet (500 mg) by mouth 2 times daily       clonazePAM 0.5 MG tablet    klonoPIN    30 tablet    Take 1 tablet (0.5 mg) by mouth nightly as needed for anxiety       conjugated estrogens cream    PREMARIN     Place vaginally twice a week       magnesium 250 MG tablet     100 tablet    Take 1 tablet by mouth daily.       ondansetron 4 MG ODT tab    ZOFRAN ODT    20 tablet    Take 1-2 tablets (4-8 mg) by mouth every 8 hours as needed for nausea       sertraline 100 MG tablet    ZOLOFT    90 tablet    Take 1 tablet (100 mg) by mouth daily       SUMAtriptan 50 MG tablet    IMITREX    9 tablet    Take 1 tablet (50 mg) by mouth at onset of headache for migraine May repeat in 2 hours. Max 4 tablets/24 hours.       triamcinolone 0.1 % cream    KENALOG    80 g    Apply sparingly to affected area three times daily for 14 days.       VITAMIN B COMPLEX PO      Take  by mouth.

## 2017-06-09 LAB — B BURGDOR IGG+IGM SER QL: 0.05 (ref 0–0.89)

## 2017-06-09 NOTE — PROGRESS NOTES
Your labs are all normal and stable. We should consider rechecking your lyme's disease titers in about 4-6 weeks.   Wendy Calderón PA-C

## 2017-06-12 ENCOUNTER — OFFICE VISIT (OUTPATIENT)
Dept: FAMILY MEDICINE | Facility: CLINIC | Age: 52
End: 2017-06-12
Payer: COMMERCIAL

## 2017-06-12 VITALS
OXYGEN SATURATION: 96 % | BODY MASS INDEX: 19.72 KG/M2 | SYSTOLIC BLOOD PRESSURE: 108 MMHG | HEART RATE: 74 BPM | WEIGHT: 131 LBS | TEMPERATURE: 98.2 F | DIASTOLIC BLOOD PRESSURE: 73 MMHG

## 2017-06-12 DIAGNOSIS — R51.9 ACUTE NONINTRACTABLE HEADACHE, UNSPECIFIED HEADACHE TYPE: ICD-10-CM

## 2017-06-12 DIAGNOSIS — M54.2 NECK PAIN: Primary | ICD-10-CM

## 2017-06-12 PROCEDURE — 99213 OFFICE O/P EST LOW 20 MIN: CPT | Performed by: FAMILY MEDICINE

## 2017-06-12 RX ORDER — CAPSAICIN 0.025 %
CREAM (GRAM) TOPICAL
Qty: 1 TUBE | Refills: 0 | Status: SHIPPED | OUTPATIENT
Start: 2017-06-12 | End: 2017-12-11

## 2017-06-12 RX ORDER — CYCLOBENZAPRINE HCL 10 MG
10 TABLET ORAL
Qty: 14 TABLET | Refills: 0 | Status: SHIPPED | OUTPATIENT
Start: 2017-06-12 | End: 2018-03-21

## 2017-06-12 NOTE — PROGRESS NOTES
SUBJECTIVE:                                                    Sharita Joseph is a 51 year old female who presents to clinic today for the following health issues:    Headache follow up,seen Wendy Calderón on 17,still has headache and pain goes to upper back and shoulders  area  and wants pain medication     See office visit note with Wendy Calderón on 2017.   Pt took Sumatriptan for couple of days. Her HAs resolved, now her pain has moved down from head and neck down to her shoulder blades. It feels like muscles are tight, it is worse on the left side than right.   She stopped taking sumatriptan as she does not think it is migraine.   She has not taken zofran today, nausea if much better.   She took her daughter's hydrocodone from her dental work up that made her constipate and she does not want strong pain medication.     Pt is seeing chiropractor.       Problem list and histories reviewed & adjusted, as indicated.  Additional history: as documented    Patient Active Problem List   Diagnosis     Nonspecific abnormal results of liver function study     Allergic rhinitis due to other allergen     Premenstrual tension syndrome     Other chronic allergic conjunctivitis     Chronic rhinitis     Excessive or frequent menstruation     CARDIOVASCULAR SCREENING; LDL GOAL LESS THAN 160     Anxiety     Health Care Home     Major depression in complete remission (H)     DUB (dysfunctional uterine bleeding)     Vaginal burning     Dermal nevus     Epidermoid cyst of skin     Seborrheic keratosis     FH: breast cancer     Past Surgical History:   Procedure Laterality Date     C/SECTION, LOW TRANSVERSE      , Low Transverse     D & C       DILATION AND CURETTAGE, HYSTEROSCOPY DIAGNOSTIC, COMBINED  2014    Procedure: COMBINED DILATION AND CURETTAGE, HYSTEROSCOPY DIAGNOSTIC;  Surgeon: Cal Covington MD;  Location:  OR     ENDOSCOPY       TONSILLECTOMY         Social History   Substance Use Topics      Smoking status: Former Smoker     Quit date: 2000     Smokeless tobacco: Never Used     Alcohol use Yes      Comment: rare     Family History   Problem Relation Age of Onset     OSTEOPOROSIS Mother      Breast Cancer Mother      Dx over 20 years ago, age 46     Arthritis Mother      Psychotic Disorder Mother      HEART DISEASE Mother      Lipids Father      Hypertension Father      CEREBROVASCULAR DISEASE Father      HEART DISEASE Father      CANCER Maternal Grandfather      lung, 25 years ago     Alzheimer Disease Paternal Grandmother      possible     Cancer - colorectal Paternal Grandfather      age of dx ?     Obesity Brother      Hypertension Brother      Melanoma No family hx of          BP Readings from Last 3 Encounters:   17 108/73   17 102/70   05/10/17 91/55    Wt Readings from Last 3 Encounters:   17 131 lb (59.4 kg)   17 131 lb 9.6 oz (59.7 kg)   05/10/17 134 lb (60.8 kg)                    Reviewed and updated as needed this visit by clinical staff       Reviewed and updated as needed this visit by Provider         ROS:  Constitutional, HEENT, cardiovascular, pulmonary, gi and gu systems are negative, except as otherwise noted.    OBJECTIVE:                                                    /73  Pulse 74  Temp 98.2  F (36.8  C) (Oral)  Wt 131 lb (59.4 kg)  LMP 2014  SpO2 96%  BMI 19.72 kg/m2  Body mass index is 19.72 kg/(m^2).  GENERAL: healthy, alert and no distress  Cervical spine: left lower paracervical and left upper thoracic paraspinal tenderness. Cervical spine flexion and rotation cause pulling sensation on the left side.   Left shoulder circumduction cause pain or pulling sensation on the left shoulder blase area.   RESP: lungs clear to auscultation - no rales, rhonchi or wheezes  CV: regular rate and rhythm, normal S1 S2, no S3 or S4, no murmur, click or rub, no peripheral edema and peripheral pulses strong  ABDOMEN: soft, left sided  tenderness ( pt is constipated from hydrocodone), no hepatosplenomegaly, no masses and bowel sounds normal  CNS: normal.        ASSESSMENT/PLAN:                                                        ICD-10-CM    1. Neck pain M54.2 cyclobenzaprine (FLEXERIL) 10 MG tablet     capsaicin (ZOSTRIX) 0.025 % CREA cream   2. Acute nonintractable headache, unspecified headache type R51 **Lyme Disease Marisa with reflex to WB Serum FUTURE 14d    resolved       Recent labs look reassuring. .   Lyme titers in 4-6 weeks, lab only visit.   Continue to see Chiropractor  F/u prn.     Arlette Ramirez MD  [facility]

## 2017-06-12 NOTE — MR AVS SNAPSHOT
After Visit Summary   6/12/2017    Sharita Joseph    MRN: [id number]           Patient Information     Date Of Birth          1965        Visit Information        Provider Department      6/12/2017 3:00 PM Arlette Ramirez MD [facility]        Today's Diagnoses     Neck pain    -  1       Follow-ups after your visit        Who to contact     If you have questions or need follow up information about today's clinic visit or your schedule please contact [facility] directly at [phone].  Normal or non-critical lab and imaging results will be communicated to you by [employer], letter or phone within 4 business days after the clinic has received the results. If you do not hear from us within 7 days, please contact the clinic through [employer] or phone. If you have a critical or abnormal lab result, we will notify you by phone as soon as possible.  Submit refill requests through [employer] or call your pharmacy and they will forward the refill request to us. Please allow 3 business days for your refill to be completed.          Additional Information About Your Visit        MyChart Information     [employer] gives you secure access to your electronic health record. If you see a primary care provider, you can also send messages to your care team and make appointments. If you have questions, please call your primary care clinic.  If you do not have a primary care provider, please call [phone] and they will assist you.        Care EveryWhere ID     This is your Care EveryWhere ID. This could be used by other organizations to access your [facility] medical records  [id number]        Your Vitals Were     Pulse Temperature Last Period Pulse Oximetry BMI (Body Mass Index)       74 98.2  F (36.8  C) (Oral) 08/13/2014 96% 19.72 kg/m2        Blood Pressure from Last 3 Encounters:   06/12/17 108/73   06/08/17 102/70   05/10/17 91/55    Weight from Last 3  Encounters:   06/12/17 131 lb (59.4 kg)   06/08/17 131 lb 9.6 oz (59.7 kg)   05/10/17 134 lb (60.8 kg)              Today, you had the following     No orders found for display         Today's Medication Changes          These changes are accurate as of: 6/12/17  3:50 PM.  If you have any questions, ask your nurse or doctor.               Start taking these medicines.        Dose/Directions    capsaicin 0.025 % Crea cream   Commonly known as:  ZOSTRIX   Used for:  Neck pain   Started by:  Arlette Ramirez MD        Use as directed on the package.   Quantity:  1 Tube   Refills:  0       cyclobenzaprine 10 MG tablet   Commonly known as:  FLEXERIL   Used for:  Neck pain   Started by:  Arlette Ramirez MD        Dose:  10 mg   Take 1 tablet (10 mg) by mouth nightly as needed for muscle spasms   Quantity:  14 tablet   Refills:  0         Stop taking these medicines if you haven't already. Please contact your care team if you have questions.     SUMAtriptan 50 MG tablet   Commonly known as:  IMITREX   Stopped by:  Arlette Ramirez MD                Where to get your medicines      These medications were sent to [id number] - [place], [place] - [address] AT [facility] [address]  [address], [place] [address]     Phone:  [phone]     capsaicin 0.025 % Crea cream    cyclobenzaprine 10 MG tablet                Primary Care Provider Office Phone # Fax #    Arlette Ramirez [phone]       [facility] [address]  [facility] [address]        Thank you!     Thank you for choosing [facility]  for your care. Our goal is always to provide you with excellent care. Hearing back from our patients is one way we can continue to improve our services. Please take a few minutes to complete the written survey that you may receive in the mail after your visit with us. Thank you!              Your Updated Medication List - Protect others around you: Learn how to safely use, store and throw away your medicines at www.disposemymeds.org.          This list is accurate as of: 6/12/17  3:50 PM.  Always use your most recent med list.                   Brand Name Dispense Instructions for use    ADVIL PO      Take 200 mg by mouth       capsaicin 0.025 % Crea cream    ZOSTRIX    1 Tube    Use as directed on the package.       clonazePAM 0.5 MG tablet    klonoPIN    30 tablet    Take 1 tablet (0.5 mg) by mouth nightly as needed for anxiety       conjugated estrogens cream    PREMARIN     Place vaginally twice a week       cyclobenzaprine 10 MG tablet    FLEXERIL    14 tablet    Take 1 tablet (10 mg) by mouth nightly as needed for muscle spasms       magnesium 250 MG tablet     100 tablet    Take 1 tablet by mouth daily.       ondansetron 4 MG ODT tab    ZOFRAN ODT    20 tablet    Take 1-2 tablets (4-8 mg) by mouth every 8 hours as needed for nausea       sertraline 100 MG tablet    ZOLOFT    90 tablet    Take 1 tablet (100 mg) by mouth daily       triamcinolone 0.1 % cream    KENALOG    80 g    Apply sparingly to affected area three times daily for 14 days.       VITAMIN B COMPLEX PO      Take  by mouth.

## 2017-06-12 NOTE — NURSING NOTE
"Chief Complaint   Patient presents with     Headache     follow up        Initial /73  Pulse 74  Temp 98.2  F (36.8  C) (Oral)  Wt 131 lb (59.4 kg)  LMP 08/13/2014  SpO2 96%  BMI 19.72 kg/m2 Estimated body mass index is 19.72 kg/(m^2) as calculated from the following:    Height as of 6/8/17: 5' 8.35\" (1.736 m).    Weight as of this encounter: 131 lb (59.4 kg).  Medication Reconciliation: complete  Lashaun Silva MA    "

## 2017-06-30 DIAGNOSIS — N94.10 DYSPAREUNIA, FEMALE: Primary | ICD-10-CM

## 2017-06-30 DIAGNOSIS — N95.1 SYMPTOMATIC MENOPAUSAL OR FEMALE CLIMACTERIC STATES: ICD-10-CM

## 2017-06-30 NOTE — TELEPHONE ENCOUNTER
Pending Prescriptions:                       Disp   Refills    conjugated estrogens (PREMARIN) cream     30 g                Sig: Place vaginally twice a week    Arianna Akins [facility]  June 30, 2017 12:20 PM

## 2017-07-06 ENCOUNTER — RADIANT APPOINTMENT (OUTPATIENT)
Dept: MAMMOGRAPHY | Facility: CLINIC | Age: 52
End: 2017-07-06
Attending: FAMILY MEDICINE
Payer: COMMERCIAL

## 2017-07-06 DIAGNOSIS — Z12.31 VISIT FOR SCREENING MAMMOGRAM: ICD-10-CM

## 2017-07-06 PROCEDURE — 77063 BREAST TOMOSYNTHESIS BI: CPT

## 2017-07-06 PROCEDURE — G0202 SCR MAMMO BI INCL CAD: HCPCS

## 2017-07-06 NOTE — TELEPHONE ENCOUNTER
conjugated estrogens (PREMARIN) vaginal cream     --   Sig: Place vaginally twice a week   Class: Historical      conjugated estrogens (PREMARIN) vaginal cream (Discontinued) 42.5 g 12 1/28/2016 3/14/2016 --   Sig: One applicatorful daily intravaginally until asymptomatic and then use 3 times per week prn   Class: E-Prescribe   Notes to Pharmacy: 0.5g cream (as measured with package applicator) = 0.3125mg conj. estrogens   Reason for Discontinue: Therapy completed     Next 5 appointments (look out 90 days)     Jul 12, 2017  1:30 PM CDT   Office Visit with Irene Stallworth,    [facility] ([facility])    [address] [place] [address] [phone]                Patient has mammogram scheduled today at 1330 and future appt as noted above.    Routing refill request to provider for review/approval because:  Medication is reported/historical  Previously associated dx selected for Dr. Stallworth's review.  Pat BAER pended  Will route to Dr. Stallworth for review & orders. Debbie Oliva RN, BAN

## 2017-07-07 NOTE — PROGRESS NOTES
Dear Sharita Joseph,     Your recent mammogram is NEGATIVE.     Arlette Ramirez MD.   Family Physician.  [facility].

## 2017-07-07 NOTE — TELEPHONE ENCOUNTER
Pharmacy called asking if the RX will be approved.  Notified pharmacy that this medication will be addressed at her upcoming appointment on 7/12/2017.  The medication is listed as historic.  Fadia Kramer RN

## 2017-07-12 ENCOUNTER — OFFICE VISIT (OUTPATIENT)
Dept: OBGYN | Facility: CLINIC | Age: 52
End: 2017-07-12
Payer: COMMERCIAL

## 2017-07-12 VITALS
SYSTOLIC BLOOD PRESSURE: 88 MMHG | HEIGHT: 69 IN | WEIGHT: 131.7 LBS | HEART RATE: 78 BPM | DIASTOLIC BLOOD PRESSURE: 63 MMHG | BODY MASS INDEX: 19.51 KG/M2 | OXYGEN SATURATION: 96 %

## 2017-07-12 DIAGNOSIS — Z00.00 ROUTINE GENERAL MEDICAL EXAMINATION AT A HEALTH CARE FACILITY: Primary | ICD-10-CM

## 2017-07-12 DIAGNOSIS — Z79.890 POSTMENOPAUSAL HORMONE REPLACEMENT THERAPY: ICD-10-CM

## 2017-07-12 PROCEDURE — 99396 PREV VISIT EST AGE 40-64: CPT | Performed by: OBSTETRICS & GYNECOLOGY

## 2017-07-12 RX ORDER — SUMATRIPTAN 50 MG/1
TABLET, FILM COATED ORAL
Refills: 0 | COMMUNITY
Start: 2017-06-08 | End: 2018-03-21

## 2017-07-12 ASSESSMENT — ANXIETY QUESTIONNAIRES
6. BECOMING EASILY ANNOYED OR IRRITABLE: NEARLY EVERY DAY
GAD7 TOTAL SCORE: 10
7. FEELING AFRAID AS IF SOMETHING AWFUL MIGHT HAPPEN: NOT AT ALL
3. WORRYING TOO MUCH ABOUT DIFFERENT THINGS: NOT AT ALL
1. FEELING NERVOUS, ANXIOUS, OR ON EDGE: NEARLY EVERY DAY
2. NOT BEING ABLE TO STOP OR CONTROL WORRYING: NOT AT ALL
IF YOU CHECKED OFF ANY PROBLEMS ON THIS QUESTIONNAIRE, HOW DIFFICULT HAVE THESE PROBLEMS MADE IT FOR YOU TO DO YOUR WORK, TAKE CARE OF THINGS AT HOME, OR GET ALONG WITH OTHER PEOPLE: SOMEWHAT DIFFICULT
5. BEING SO RESTLESS THAT IT IS HARD TO SIT STILL: SEVERAL DAYS

## 2017-07-12 ASSESSMENT — PATIENT HEALTH QUESTIONNAIRE - PHQ9: 5. POOR APPETITE OR OVEREATING: NEARLY EVERY DAY

## 2017-07-12 NOTE — NURSING NOTE
"Chief Complaint   Patient presents with     Physical       Initial BP (!) 88/63  Pulse 78  Ht 1.74 m (5' 8.5\")  Wt 59.7 kg (131 lb 11.2 oz)  LMP 08/13/2014  SpO2 96%  Breastfeeding? No  BMI 19.73 kg/m2 Estimated body mass index is 19.73 kg/(m^2) as calculated from the following:    Height as of this encounter: 1.74 m (5' 8.5\").    Weight as of this encounter: 59.7 kg (131 lb 11.2 oz).  Medication Reconciliation:   Arianna Akins CMA  July 12, 2017 1:47 PM        "

## 2017-07-12 NOTE — PROGRESS NOTES
Sharita is a 51 year old female, , who is here for her annual exam and requests a refill of Premarin for treatment of menopausal vaginal atrophy.  She denies any other postmenopausal symptoms.  She declines a pap smear today since she is not due for this until 2018.  She states that she will never have another colonoscopy again so declines future screening.  Her PGF was dx with colon cancer.    ROS: Ten point review of systems was reviewed and negative except the above.    Health Maintenance   Topic Date Due     HEPATITIS C SCREENING  1983     LIPID SCREEN Q5 YR FEMALE (SYSTEM ASSIGNED)  2016     DEPRESSION ACTION PLAN Q1 YR  2017     PHQ-9 Q6 MONTHS  2017     PAP Q3 YR  2018     MAMMO SCREEN Q2 YR (SYSTEM ASSIGNED)  2019     COLON CANCER SCREEN (SYSTEM ASSIGNED)  2022     TETANUS IMMUNIZATION (SYSTEM ASSIGNED)  2022      Last pap: 11/9/15 normal  Last Mammogram: normal on 17  Last Dexa: not applicable  Last Colonoscopy: next due in  but pt declines any future procedures  Lab Results   Component Value Date    CHOL 183 2011     Lab Results   Component Value Date    HDL 78 2011     Lab Results   Component Value Date    LDL 89 2011     Lab Results   Component Value Date    TRIG 79 2011     Lab Results   Component Value Date    CHOLHDLRATIO 2.0 2011         OBHX:      PSH:   Past Surgical History:   Procedure Laterality Date     C/SECTION, LOW TRANSVERSE      , Low Transverse     D & C       DILATION AND CURETTAGE, HYSTEROSCOPY DIAGNOSTIC, COMBINED  2014    Procedure: COMBINED DILATION AND CURETTAGE, HYSTEROSCOPY DIAGNOSTIC;  Surgeon: Cla Covington MD;  Location: [facility] OR     ENDOSCOPY       TONSILLECTOMY           PMH: Her past medical, surgical, and obstetric histories were reviewed and are documented in their appropriate chart areas.    ALL/Meds: Her medication and allergy histories were reviewed and are  "documented in their appropriate chart areas.    SH/FMH: Her social and family history was reviewed and documented in its appropriate chart area.    PE: BP (!) 88/63  Pulse 78  Ht 1.74 m (5' 8.5\")  Wt 59.7 kg (131 lb 11.2 oz)  LMP 08/13/2014  SpO2 96%  Breastfeeding? No  BMI 19.73 kg/m2  Body mass index is 19.73 kg/(m^2).    General Appearance:  healthy, alert, active, no distress  Cardiovascular:  Regular rate and Rhythm without murmur  Neck: Supple, no adenopathy and thyroid normal  Lungs:  Clear, without wheeze, rale or rhonchi  Breast: normal breast exam  Abdomen: Benign, Soft, flat, non-tender, No masses, organomegaly, No inguinal nodes and Bowel sounds normoactive.   Pelvic:       - Ext: Vulva and perineum are normal without lesion, mass or discharge        - Urethra: normal without discharge        - Urethral Meatus: normal appearance       - Bladder: no tenderness, no masses       - Vagina: Normal mucosa, no discharge        - Cervix: normal and nulliparous       - Uterus:Normal shape, position and consistency        - Adnexa: Normal without masses or tenderness       - Rectal: deferred    A/P:  Well Woman     -  I discussed the new pap recommendations regarding screening.  Explained the rationale for increased intervals between paps.  Questions asked and answered.  She does agree to this regiment.  Pap was not obtained since not due until 11/2018   -  BC: postmenopausal so not applicable   -  No orders of the defined types were placed in this encounter.     - Encouraged self-breast exam   - Encouraged low fat diet, regular exercise, and adequate calcium intake.   - Refilled Premarin vaginal cream and directions were discussed    Irene Stallworth DO  FACOG, FACS      "

## 2017-07-12 NOTE — MR AVS SNAPSHOT
After Visit Summary   7/12/2017    Sharita Joseph    MRN: [id number]           Patient Information     Date Of Birth          1965        Visit Information        Provider Department      7/12/2017 1:30 PM Irene Stallworth DO [facility]        Care Instructions                                                         If you have any questions regarding your visit, Please contact your care team.    [facility] CLINIC HOURS TELEPHONE NUMBER   Irene DO Basim.    AR Keller -    EVELIA Garcia RN       [facility], Wednesday[facility]  8:30a.m-5:00 p.m   [facility]  [address] [place] [address]  [phone] ask for [facility]    Imaging [phone]       Urgent Care locations:    [facility] Saturday and Sunday   9 am - 5 pm    Monday-Friday   12 pm - 8 pm  Saturday and Sunday   9 am - 5 pm   [phone]     [facility] Labor and Delivery:  [phone]    If you need a medication refill, please contact your pharmacy. Please allow 3 business days for your refill to be completed.  As always, Thank you for trusting us with your healthcare needs!                Follow-ups after your visit        Who to contact     If you have questions or need follow up information about today's clinic visit or your schedule please contact [facility] directly at [phone].  Normal or non-critical lab and imaging results will be communicated to you by MyChart, letter or phone within 4 business days after the clinic has received the results. If you do not hear from us within 7 days, please contact the clinic through MyChart or phone. If you have a critical or abnormal lab result, we will notify you by phone as soon as possible.  Submit refill requests through [employer] or call your pharmacy and they will forward the refill  "request to us. Please allow 3 business days for your refill to be completed.          Additional Information About Your Visit        [employer] Information     "[employer] gives you secure access to your electronic health record. If you see a primary care provider, you can also send messages to your care team and make appointments. If you have questions, please call your primary care clinic.  If you do not have a primary care provider, please call [phone] and they will assist you.        Care EveryWhere ID     This is your Care EveryWhere ID. This could be used by other organizations to access your [place] medical records  [id number]        Your Vitals Were     Pulse Height Last Period Pulse Oximetry Breastfeeding? BMI (Body Mass Index)    78 1.74 m (5' 8.5\") 08/13/2014 96% No 19.73 kg/m2       Blood Pressure from Last 3 Encounters:   07/12/17 (!) 88/63   06/12/17 108/73   06/08/17 102/70    Weight from Last 3 Encounters:   07/12/17 59.7 kg (131 lb 11.2 oz)   06/12/17 59.4 kg (131 lb)   06/08/17 59.7 kg (131 lb 9.6 oz)              Today, you had the following     No orders found for display         Today's Medication Changes          These changes are accurate as of: 7/12/17  1:47 PM.  If you have any questions, ask your nurse or doctor.               These medicines have changed or have updated prescriptions.        Dose/Directions    KLONOPIN PO   This may have changed:  Another medication with the same name was removed. Continue taking this medication, and follow the directions you see here.   Changed by:  Irene Stallworth,         Dose:  0.5 mg   Take 0.5 mg by mouth as needed for anxiety   Refills:  0                Primary Care Provider Office Phone # Fax #    Arlette Ramirez [phone]       [facility] [address]  [facility] [address]        Equal Access to Services     VAHE OLIVER AH: Liz Chiu, luna munguia, ambika glover " mikey rodriguezlatrice bloodaan ah. So [facility] [phone].    ATENCIÓN: Si edd cordova, tiene a jeff disposición servicios gratuitos de asistencia lingüística. Jeanmarie al [phone].    We comply with applicable federal civil rights laws and [place] laws. We do not discriminate on the basis of race, color, national origin, age, disability sex, sexual orientation or gender identity.            Thank you!     Thank you for choosing [facility]  for your care. Our goal is always to provide you with excellent care. Hearing back from our patients is one way we can continue to improve our services. Please take a few minutes to complete the written survey that you may receive in the mail after your visit with us. Thank you!             Your Updated Medication List - Protect others around you: Learn how to safely use, store and throw away your medicines at www.disposemymeds.org.          This list is accurate as of: 7/12/17  1:47 PM.  Always use your most recent med list.                   Brand Name Dispense Instructions for use Diagnosis    ADVIL PO      Take 200 mg by mouth        capsaicin 0.025 % Crea cream    ZOSTRIX    1 Tube    Use as directed on the package.    Neck pain       conjugated estrogens cream    PREMARIN     Place vaginally twice a week        cyclobenzaprine 10 MG tablet    FLEXERIL    14 tablet    Take 1 tablet (10 mg) by mouth nightly as needed for muscle spasms    Neck pain       KLONOPIN PO      Take 0.5 mg by mouth as needed for anxiety        magnesium 250 MG tablet     100 tablet    Take 1 tablet by mouth daily.        ondansetron 4 MG ODT tab    ZOFRAN ODT    20 tablet    Take 1-2 tablets (4-8 mg) by mouth every 8 hours as needed for nausea    Nonintractable headache, unspecified chronicity pattern, unspecified headache type       sertraline 100 MG tablet    ZOLOFT    90 tablet    Take 1 tablet (100 mg) by mouth daily    Major depression in complete remission  (H)       SUMAtriptan 50 MG tablet    IMITREX          triamcinolone 0.1 % cream    KENALOG    80 g    Apply sparingly to affected area three times daily for 14 days.    Xerosis of skin       VITAMIN B COMPLEX PO      Take  by mouth.

## 2017-07-12 NOTE — PATIENT INSTRUCTIONS
If you have any questions regarding your visit, Please contact your care team.    [facility] CLINIC HOURS TELEPHONE NUMBER   Irene DO Basim.    AR Keller -    EVELIA Garcia RN       Monday, Wednesday, Thursday and Friday, [place]  8:30a.m-5:00 p.m   [facility]  [address] [place] [address]  [phone] ask for [facility]    Imaging [phone]       Urgent Care locations:    [facility] Saturday and Sunday   9 am - 5 pm    Monday-Friday   12 pm - 8 pm  Saturday and Sunday   9 am - 5 pm   [phone]     [facility] Labor and Delivery:  [phone]    If you need a medication refill, please contact your pharmacy. Please allow 3 business days for your refill to be completed.  As always, Thank you for trusting us with your healthcare needs!

## 2017-07-13 ASSESSMENT — PATIENT HEALTH QUESTIONNAIRE - PHQ9: SUM OF ALL RESPONSES TO PHQ QUESTIONS 1-9: 4

## 2017-07-13 ASSESSMENT — ANXIETY QUESTIONNAIRES: GAD7 TOTAL SCORE: 10

## 2017-08-04 ENCOUNTER — TELEPHONE (OUTPATIENT)
Dept: FAMILY MEDICINE | Facility: CLINIC | Age: 52
End: 2017-08-04

## 2017-08-04 NOTE — LETTER
August 4, 2017    Sharita Joseph  [address]  [facility] [address]    Dear Sharita    We care about your health and have reviewed your health plan. We have reviewed your medical conditions, medication list, and lab results and are making recommendations based on this review, to better manage your health.    You are in particular need of attention regarding:  - Your Depression  - Fasting labs      Here is a list of Health Maintenance topics that are due now or due soon:  Health Maintenance Due   Topic Date Due     HEPATITIS C SCREENING  12/13/1983     LIPID SCREEN Q5 YR FEMALE (SYSTEM ASSIGNED)  04/22/2016     DEPRESSION ACTION PLAN Q1 YR  03/14/2017     We will be calling you in the next couple of weeks to help you schedule any appointments that are needed.  Please call us at [phone] (or use [employer]) to address the above recommendations.     Thank you for trusting [facility] and we appreciate the opportunity to serve you.  We look forward to supporting your healthcare needs in the future.    Healthy Regards,    Your [facility] Care Team/nr

## 2017-08-04 NOTE — TELEPHONE ENCOUNTER
Spoke with the patient and scheduled her for a follow up on 08/18/17. Declined PHQ-9 over the phone but did state that she will come in fasting for a lipid screening.

## 2017-08-14 ENCOUNTER — TRANSFERRED RECORDS (OUTPATIENT)
Dept: HEALTH INFORMATION MANAGEMENT | Facility: CLINIC | Age: 52
End: 2017-08-14

## 2017-08-15 ENCOUNTER — TRANSFERRED RECORDS (OUTPATIENT)
Dept: HEALTH INFORMATION MANAGEMENT | Facility: CLINIC | Age: 52
End: 2017-08-15

## 2017-08-18 ENCOUNTER — OFFICE VISIT (OUTPATIENT)
Dept: FAMILY MEDICINE | Facility: CLINIC | Age: 52
End: 2017-08-18
Payer: COMMERCIAL

## 2017-08-18 VITALS
WEIGHT: 130 LBS | SYSTOLIC BLOOD PRESSURE: 99 MMHG | BODY MASS INDEX: 19.48 KG/M2 | HEART RATE: 58 BPM | DIASTOLIC BLOOD PRESSURE: 64 MMHG | TEMPERATURE: 97.5 F

## 2017-08-18 DIAGNOSIS — F32.5 MAJOR DEPRESSION IN COMPLETE REMISSION (H): Primary | ICD-10-CM

## 2017-08-18 DIAGNOSIS — Z13.6 CARDIOVASCULAR SCREENING; LDL GOAL LESS THAN 160: ICD-10-CM

## 2017-08-18 DIAGNOSIS — Z11.59 NEED FOR HEPATITIS C SCREENING TEST: ICD-10-CM

## 2017-08-18 LAB
CHOLEST SERPL-MCNC: 210 MG/DL
HDLC SERPL-MCNC: 95 MG/DL
LDLC SERPL CALC-MCNC: 104 MG/DL
NONHDLC SERPL-MCNC: 115 MG/DL
TRIGL SERPL-MCNC: 56 MG/DL

## 2017-08-18 PROCEDURE — 80061 LIPID PANEL: CPT | Performed by: FAMILY MEDICINE

## 2017-08-18 PROCEDURE — 36415 COLL VENOUS BLD VENIPUNCTURE: CPT | Performed by: FAMILY MEDICINE

## 2017-08-18 PROCEDURE — 99213 OFFICE O/P EST LOW 20 MIN: CPT | Performed by: FAMILY MEDICINE

## 2017-08-18 PROCEDURE — 86803 HEPATITIS C AB TEST: CPT | Performed by: FAMILY MEDICINE

## 2017-08-18 RX ORDER — SERTRALINE HYDROCHLORIDE 100 MG/1
100 TABLET, FILM COATED ORAL DAILY
Qty: 90 TABLET | Refills: 1 | Status: SHIPPED | OUTPATIENT
Start: 2017-08-18 | End: 2018-04-18

## 2017-08-18 NOTE — PROGRESS NOTES
SUBJECTIVE:   Sharita Joseph is a 51 year old female who presents to clinic today for the following health issues:    Depression and Anxiety Follow-Up    Status since last visit: No change    Other associated symptoms:None    Complicating factors:     Significant life event: No     Current substance abuse: None    PHQ-9 SCORE 2016   Total Score - - -   Total Score 9 14 4     LUDIN-7 SCORE 2016   Total Score 6 - -   Total Score - 18 10       PHQ-9  English  PHQ-9   Any Language  GAD7      Amount of exercise or physical activity: yes    Problems taking medications regularly: No    Medication side effects: none  Diet: regular (no restrictions)    Depression : under good control with Zoloft 100 mg. She tried 50 mg only for few days , did not help much. Will continue with 100 mg daily dose.   Clonopin, not needed at this point.      Cholesterol check : pt is fasting today.     She scheduled appointment as she received message from the clinic to schedule appointment for depression f/u and fasting labs.     Problem list and histories reviewed & adjusted, as indicated.  Additional history: as documented    Patient Active Problem List   Diagnosis     Nonspecific abnormal results of liver function study     Allergic rhinitis due to other allergen     Premenstrual tension syndrome     Other chronic allergic conjunctivitis     Chronic rhinitis     Excessive or frequent menstruation     CARDIOVASCULAR SCREENING; LDL GOAL LESS THAN 160     Anxiety     Health Care Home     Major depression in complete remission (H)     DUB (dysfunctional uterine bleeding)     Vaginal burning     Dermal nevus     Epidermoid cyst of skin     Seborrheic keratosis     FH: breast cancer     Past Surgical History:   Procedure Laterality Date     C/SECTION, LOW TRANSVERSE      , Low Transverse     D & C       DILATION AND CURETTAGE, HYSTEROSCOPY DIAGNOSTIC, COMBINED  2014    Procedure:  COMBINED DILATION AND CURETTAGE, HYSTEROSCOPY DIAGNOSTIC;  Surgeon: Cal Covington MD;  Location: [facility] OR     ENDOSCOPY       TONSILLECTOMY         Social History   Substance Use Topics     Smoking status: Former Smoker     Quit date: 2000     Smokeless tobacco: Never Used     Alcohol use Yes      Comment: rare     Family History   Problem Relation Age of Onset     OSTEOPOROSIS Mother      Breast Cancer Mother      Dx over 20 years ago, age 46     Arthritis Mother      Psychotic Disorder Mother      HEART DISEASE Mother      Lipids Father      Hypertension Father      CEREBROVASCULAR DISEASE Father      HEART DISEASE Father      Dementia Father      CANCER Maternal Grandfather      lung, 25 years ago     Alzheimer Disease Paternal Grandmother      possible     Cancer - colorectal Paternal Grandfather      age of dx ?     Obesity Brother      Hypertension Brother      Melanoma No family hx of          Recent Labs   Lab Test  17   1014  05/10/17   1517  16   1441   14   0912  11   0705   LDL   --    --    --    --    --   89   HDL   --    --    --    --    --   78   TRIG   --    --    --    --    --   79   ALT  36  39  34   < >   --    --    CR  0.86  0.96  0.84   < >   --    --    GFRESTIMATED  70  62  71   < >   --    --    GFRESTBLACK  84  74  86   < >   --    --    POTASSIUM  3.9  4.7  4.1   < >   --    --    TSH   --    --   0.94   --   1.19   --     < > = values in this interval not displayed.      BP Readings from Last 3 Encounters:   17 99/64   17 (!) 88/63   17 108/73    Wt Readings from Last 3 Encounters:   17 130 lb (59 kg)   17 131 lb 11.2 oz (59.7 kg)   17 131 lb (59.4 kg)            Reviewed and updated as needed this visit by clinical staffTobacco  Allergies  Meds  Med Hx  Surg Hx  Fam Hx  Soc Hx      Reviewed and updated as needed this visit by Provider         ROS:  Constitutional, HEENT, cardiovascular, pulmonary, gi  and gu systems are negative, except as otherwise noted.      OBJECTIVE:   BP 99/64  Pulse 58  Temp 97.5  F (36.4  C) (Oral)  Wt 130 lb (59 kg)  LMP 08/13/2014  BMI 19.48 kg/m2  Body mass index is 19.48 kg/(m^2).  GENERAL: healthy, alert and no distress  RESP: lungs clear to auscultation - no rales, rhonchi or wheezes  CV: regular rate and rhythm, normal S1 S2, no S3 or S4, no murmur, click or rub, no peripheral edema and peripheral pulses strong  PSYCH: mentation appears normal, affect normal/bright    PHQ-9 SCORE 11/18/2016 1/30/2017 7/12/2017   Total Score - - -   Total Score 9 14 4       ASSESSMENT/PLAN:         ICD-10-CM    1. Major depression in complete remission (H) F32.5 sertraline (ZOLOFT) 100 MG tablet     DEPRESSION ACTION PLAN (DAP)   2. CARDIOVASCULAR SCREENING; LDL GOAL LESS THAN 160 Z13.6 Lipid panel reflex to direct LDL   3. Need for hepatitis C screening test Z11.59 **Hepatitis C Screen Reflex to RNA FUTURE anytime     Continue zoloft 100 mg daily, DAP done. F/u in 6 months or prn.     Fasting lipids today.     Arlette Ramirez MD  [facility]

## 2017-08-18 NOTE — MR AVS SNAPSHOT
After Visit Summary   8/18/2017    Sharita Joseph    MRN: [id number]           Patient Information     Date Of Birth          1965        Visit Information        Provider Department      8/18/2017 6:40 AM Arlette Ramirez MD [facility]        Today's Diagnoses     CARDIOVASCULAR SCREENING; LDL GOAL LESS THAN 160    -  1    Major depression in complete remission (H)        Need for hepatitis C screening test           Follow-ups after your visit        Who to contact     If you have questions or need follow up information about today's clinic visit or your schedule please contact [facility] directly at [phone].  Normal or non-critical lab and imaging results will be communicated to you by MyChart, letter or phone within 4 business days after the clinic has received the results. If you do not hear from us within 7 days, please contact the clinic through [employer] or phone. If you have a critical or abnormal lab result, we will notify you by phone as soon as possible.  Submit refill requests through [employer] or call your pharmacy and they will forward the refill request to us. Please allow 3 business days for your refill to be completed.          Additional Information About Your Visit        MyChart Information     [employer] gives you secure access to your electronic health record. If you see a primary care provider, you can also send messages to your care team and make appointments. If you have questions, please call your primary care clinic.  If you do not have a primary care provider, please call [phone] and they will assist you.        Care EveryWhere ID     This is your Care EveryWhere ID. This could be used by other organizations to access your [place] medical records  [id number]        Your Vitals Were     Pulse Temperature Last Period BMI (Body Mass Index)          58 97.5  F (36.4  C) (Oral) 08/13/2014 19.48 kg/m2          Blood Pressure from Last 3 Encounters:   08/18/17 99/64   07/12/17 (!) 88/63   06/12/17 108/73    Weight from Last 3 Encounters:   08/18/17 130 lb (59 kg)   07/12/17 131 lb 11.2 oz (59.7 kg)   06/12/17 131 lb (59.4 kg)              We Performed the Following     **Hepatitis C Screen Reflex to RNA FUTURE anytime     Lipid panel reflex to direct LDL          Where to get your medicines      These medications were sent to [id number] - [place], [place] - [address] AT [facility] [address]  [address], [place] [address]     Phone:  [phone]     sertraline 100 MG tablet          Primary Care Provider Office Phone # Fax #    Arlette Fady Ramirez [phone]       [address] [facility] [address]        Equal Access to Services     VAHE OLIVER AH: Hadii aad ku hadasho Soomaali, waaxda luqadaha, qaybta kaalmada adeegyada, waxay idiin hayaan gabby hatch . So [facility] [phone].    ATENCIÓN: Si habla español, tiene a jeff disposición servicios gratuitos de asistencia lingüística. Llame al [phone].    We comply with applicable federal civil rights laws and [place] laws. We do not discriminate on the basis of race, color, national origin, age, disability sex, sexual orientation or gender identity.            Thank you!     Thank you for choosing [facility]  for your care. Our goal is always to provide you with excellent care. Hearing back from our patients is one way we can continue to improve our services. Please take a few minutes to complete the written survey that you may receive in the mail after your visit with us. Thank you!             Your Updated Medication List - Protect others around you: Learn how to safely use, store and throw away your medicines at www.disposemymeds.org.          This list is accurate as of: 8/18/17  7:19 AM.  Always use your most recent med list.                    Brand Name Dispense Instructions for use Diagnosis    ADVIL PO      Take 200 mg by mouth        capsaicin 0.025 % Crea cream    ZOSTRIX    1 Tube    Use as directed on the package.    Neck pain       conjugated estrogens cream    PREMARIN     Place vaginally twice a week        cyclobenzaprine 10 MG tablet    FLEXERIL    14 tablet    Take 1 tablet (10 mg) by mouth nightly as needed for muscle spasms    Neck pain       KLONOPIN PO      Take 0.5 mg by mouth as needed for anxiety        magnesium 250 MG tablet     100 tablet    Take 1 tablet by mouth daily.        ondansetron 4 MG ODT tab    ZOFRAN ODT    20 tablet    Take 1-2 tablets (4-8 mg) by mouth every 8 hours as needed for nausea    Nonintractable headache, unspecified chronicity pattern, unspecified headache type       sertraline 100 MG tablet    ZOLOFT    90 tablet    Take 1 tablet (100 mg) by mouth daily    Major depression in complete remission (H)       SUMAtriptan 50 MG tablet    IMITREX

## 2017-08-18 NOTE — LETTER
My Depression Action Plan  Name: Sharita Joseph   Date of Birth 1965  Date: 8/18/2017    My doctor: Arlette Ramirez   My clinic: [address] [id number] [phone]          GREEN    ZONE   Good Control    What it looks like:     Things are going generally well. You have normal up s and down s. You may even feel depressed from time to time, but bad moods usually last less than a day.   What you need to do:  1. Continue to care for yourself (see self care plan)  2. Check your depression survival kit and update it as needed  3. Follow your physician s recommendations including any medication.  4. Do not stop taking medication unless you consult with your physician first.           YELLOW         ZONE Getting Worse    What it looks like:     Depression is starting to interfere with your life.     It may be hard to get out of bed; you may be starting to isolate yourself from others.    Symptoms of depression are starting to last most all day and this has happened for several days.     You may have suicidal thoughts but they are not constant.   What you need to do:     1. Call your care team, your response to treatment will improve if you keep your care team informed of your progress. Yellow periods are signs an adjustment may need to be made.     2. Continue your self-care, even if you have to fake it!    3. Talk to someone in your support network    4. Open up your depression survival kit           RED    ZONE Medical Alert - Get Help    What it looks like:     Depression is seriously interfering with your life.     You may experience these or other symptoms: You can t get out of bed most days, can t work or engage in other necessary activities, you have trouble taking care of basic hygiene, or basic responsibilities, thoughts of suicide or death that will not go away, self-injurious behavior.     What you need to  do:  1. Call your care team and request a same-day appointment. If they are not available (weekends or after hours) call your local crisis line, emergency room or 911.      Electronically signed by: Arlette Ramirez, August 18, 2017    Depression Self Care Plan / Survival Kit    Self-Care for Depression  Here s the deal. Your body and mind are really not as separate as most people think.  What you do and think affects how you feel and how you feel influences what you do and think. This means if you do things that people who feel good do, it will help you feel better.  Sometimes this is all it takes.  There is also a place for medication and therapy depending on how severe your depression is, so be sure to consult with your medical provider and/ or Behavioral Health Consultant if your symptoms are worsening or not improving.     In order to better manage my stress, I will:    Exercise  Get some form of exercise, every day. This will help reduce pain and release endorphins, the  feel good  chemicals in your brain. This is almost as good as taking antidepressants!  This is not the same as joining a gym and then never going! (they count on that by the way ) It can be as simple as just going for a walk or doing some gardening, anything that will get you moving.      Hygiene   Maintain good hygiene (Get out of bed in the morning, Make your bed, Brush your teeth, Take a shower, and Get dressed like you were going to work, even if you are unemployed).  If your clothes don't fit try to get ones that do.    Diet  I will strive to eat foods that are good for me, drink plenty of water, and avoid excessive sugar, caffeine, alcohol, and other mood-altering substances.  Some foods that are helpful in depression are: complex carbohydrates, B vitamins, flaxseed, fish or fish oil, fresh fruits and vegetables.    Psychotherapy  I agree to participate in Individual Therapy (if recommended).    Medication  If prescribed  medications, I agree to take them.  Missing doses can result in serious side effects.  I understand that drinking alcohol, or other illicit drug use, may cause potential side effects.  I will not stop my medication abruptly without first discussing it with my provider.    Staying Connected With Others  I will stay in touch with my friends, family members, and my primary care provider/team.    Use your imagination  Be creative.  We all have a creative side; it doesn t matter if it s oil painting, sand castles, or mud pies! This will also kick up the endorphins.    Witness Beauty  (AKA stop and smell the roses) Take a look outside, even in mid-winter. Notice colors, textures. Watch the squirrels and birds.     Service to others  Be of service to others.  There is always someone else in need.  By helping others we can  get out of ourselves  and remember the really important things.  This also provides opportunities for practicing all the other parts of the program.    Humor  Laugh and be silly!  Adjust your TV habits for less news and crime-drama and more comedy.    Control your stress  Try breathing deep, massage therapy, biofeedback, and meditation. Find time to relax each day.     My support system    Clinic Contact:  Phone number:    Contact 1:  Phone number:    Contact 2:  Phone number:    Confucianism/:  Phone number:    Therapist:  Phone number:    [facility]:    Phone number:    Other community support:  Phone number:

## 2017-08-19 LAB — HCV AB SERPL QL IA: NONREACTIVE

## 2017-08-21 NOTE — PROGRESS NOTES
Dear Sharita Joseph,     Your recent labs look good.     Arlette Ramirez MD.   Family Physician.  [facility].

## 2017-08-24 ENCOUNTER — TRANSFERRED RECORDS (OUTPATIENT)
Dept: HEALTH INFORMATION MANAGEMENT | Facility: CLINIC | Age: 52
End: 2017-08-24

## 2017-09-15 ENCOUNTER — TELEPHONE (OUTPATIENT)
Dept: FAMILY MEDICINE | Facility: CLINIC | Age: 52
End: 2017-09-15

## 2017-09-15 DIAGNOSIS — F99 INSOMNIA DUE TO OTHER MENTAL DISORDER: Primary | ICD-10-CM

## 2017-09-15 DIAGNOSIS — F51.05 INSOMNIA DUE TO OTHER MENTAL DISORDER: Primary | ICD-10-CM

## 2017-09-15 DIAGNOSIS — F32.5 MAJOR DEPRESSION IN COMPLETE REMISSION (H): ICD-10-CM

## 2017-09-18 RX ORDER — HYDROXYZINE HYDROCHLORIDE 25 MG/1
50-100 TABLET, FILM COATED ORAL
Qty: 30 TABLET | Refills: 1 | Status: SHIPPED | OUTPATIENT
Start: 2017-09-18 | End: 2017-12-11

## 2017-09-18 RX ORDER — CLONAZEPAM 0.5 MG/1
TABLET ORAL
Qty: 30 TABLET | Refills: 0 | OUTPATIENT
Start: 2017-09-18

## 2017-09-18 RX ORDER — SERTRALINE HYDROCHLORIDE 25 MG/1
TABLET, FILM COATED ORAL
Qty: 90 TABLET | Refills: 0 | OUTPATIENT
Start: 2017-09-18

## 2017-09-18 NOTE — TELEPHONE ENCOUNTER
I called patient to discuss, she says both requests are an error and she did not request the zoloft or clonopin, she is taking zoloft 100 mg.       She says she has been having some trouble sleeping and used to use hydroxyzine; would PCP send Rx for that instead?    I refused the current requests per patient request and routed note to Dr. Ramirez for patient request for hydroxyzine instead.    Sharita Jasmine RN  [facility]

## 2017-09-18 NOTE — TELEPHONE ENCOUNTER
Hydroxyzine refill approved.     Arlette Ramirez MD.   Family Physician.  [facility].

## 2017-09-18 NOTE — TELEPHONE ENCOUNTER
sertraline (ZOLOFT) 100 MG tablet     Last Written Prescription Date: 8/18/17  Last Fill Quantity: 90, # refills: 1  Last Office Visit with [facility] primary care provider:  8/18/17        Last PHQ-9 score on record=   PHQ-9 SCORE 7/12/2017   Total Score -   Total Score 4           ClonazePAM (KLONOPIN PO)      Last Written Prescription Date:  na  Last Fill Quantity: na,   # refills: na  Last Office Visit with [facility], [facility] or [facility] prescribing provider: 8/18/17  Future Office visit:       Routing refill request to provider for review/approval because:  Medication is reported/historical

## 2017-09-18 NOTE — TELEPHONE ENCOUNTER
I see zoloft 100 mg tabs sent (90 with 1 refill) on 8/18/17.    See note from 8/18/17 visit:    Depression : under good control with Zoloft 100 mg. She tried 50 mg only for few days , did not help much. Will continue with 100 mg daily dose.   Clonopin, not needed at this point.      Continue zoloft 100 mg daily, DAP done. F/u in 6 months or prn.      Fasting lipids today.      Arlette Ramirez MD  [facility]    I called patient to discuss, she says both requests are an error and she did not request the zoloft or clonopin, she is taking zoloft 100 mg.       She says she has been having some trouble sleeping and used to use hydroxyzine; would PCP send Rx for that instead?    I refused the current requests per patient request and routed note to Dr. Ramirez for patient request for hydroxyzine instead.    Sharita Jasmine RN  [facility]

## 2017-10-13 ENCOUNTER — DOCUMENTATION ONLY (OUTPATIENT)
Dept: LAB | Facility: CLINIC | Age: 52
End: 2017-10-13

## 2017-10-13 NOTE — PROGRESS NOTES
No labs needed from me, may have orders from outside provider.     Arlette Ramirez MD.   Family Physician.  [facility].

## 2017-10-26 DIAGNOSIS — L71.0 DERMATITIS, PERIORAL: ICD-10-CM

## 2017-10-27 NOTE — TELEPHONE ENCOUNTER
Left message on identified voicemail for pt to call back.  Need to know if she is taking doxycycline or if the pharmacy just requested the medication.  She will need to make a follow up appt.  Felisa MESA RN BSN PHN  [facility]

## 2017-11-01 NOTE — TELEPHONE ENCOUNTER
Pt calling stating she would like to get a refill until her appt 12/15. She has restarted taking the doxy and would like to continue taking it to help clear up her condition until she can get in     [facility]  Specialty CSS

## 2017-11-02 ENCOUNTER — TELEPHONE (OUTPATIENT)
Dept: DERMATOLOGY | Facility: CLINIC | Age: 52
End: 2017-11-02

## 2017-11-02 DIAGNOSIS — L71.0 DERMATITIS, PERIORAL: Primary | ICD-10-CM

## 2017-11-02 RX ORDER — DOXYCYCLINE 100 MG/1
100 CAPSULE ORAL DAILY
Qty: 60 CAPSULE | Refills: 3 | Status: SHIPPED | OUTPATIENT
Start: 2017-11-02 | End: 2017-11-02 | Stop reason: ALTCHOICE

## 2017-11-02 RX ORDER — DOXYCYCLINE 100 MG/1
100 CAPSULE ORAL
Qty: 60 CAPSULE | Refills: 1 | Status: SHIPPED | OUTPATIENT
Start: 2017-11-02 | End: 2018-03-21

## 2017-11-30 ENCOUNTER — TELEPHONE (OUTPATIENT)
Dept: FAMILY MEDICINE | Facility: CLINIC | Age: 52
End: 2017-11-30

## 2017-11-30 DIAGNOSIS — F99 INSOMNIA DUE TO OTHER MENTAL DISORDER: ICD-10-CM

## 2017-11-30 DIAGNOSIS — F41.9 ANXIETY: ICD-10-CM

## 2017-11-30 DIAGNOSIS — F51.05 INSOMNIA DUE TO OTHER MENTAL DISORDER: ICD-10-CM

## 2017-11-30 NOTE — TELEPHONE ENCOUNTER
Reason for Call:  Other prescription: hydrOXYzine (ATARAX) 25 MG tablet    Detailed comments: Patient states she's having issues with this medication, and would like to discuss an alternative that she has been prescribed in the past    Phone Number Patient can be reached at: Home number on file [phone] (home)    Best Time: anytime    Can we leave a detailed message on this number? YES    Call taken on 11/30/2017 at 2:07 PM by Basil Finley

## 2017-11-30 NOTE — TELEPHONE ENCOUNTER
Sharita Joseph is a 51 year old female who calls with medication reaction.    NURSING ASSESSMENT:  Description:  Patient says she is having a racing pulse. Says her heart rate also feels irregular, maybe skips a beat. Doesn't know if it is related to hydroxyzine or if it is anxiety. She would like to take Klonopin. Used to take it 0.5 mg at bedtime. She says she sometimes feels dizzy but she does have low BP that can contribute to that. She also feels fatigued especially after taking the Atarax. She took a dose of Atarax last night. Sometimes the racing pulse happens, sometimes it doesn't after taking Atarax and always happens in the morning even if she takes it at night.   Onset/duration:  Unsure of when it started or if it can be contributed to the Atarax.   Precip. factors:  Medications, anxiety  Associated symptoms:  Racing pulse, HR feels irregular, stronger beats.  Improves/worsens symptoms:  same  Pain scale (0-10)   0/10  LMP/preg/breast feeding:  No periods  Last exam/Treatment:  8/18/17  Allergies:   Allergies   Allergen Reactions     Adhesive Tape      Dust Mites      dust     Gluten Meal      Gluten intolerance     Trees        MEDICATIONS:   Taking medication(s) as prescribed? Yes  Taking over the counter medication(s?) No  Any medication side effects? Racing pulse    Any barriers to taking medication(s) as prescribed?  No  Medication(s) improving/managing symptoms?  No  Medication reconciliation completed: No      NURSING PLAN: Routed to provider Yes    RECOMMENDED DISPOSITION:  Patient has an appointment to see provider 12/11/17. In the meantime can she get a refill of Klonopin to help her sleep until she comes in to see provider. Notified patient she needs to go to ER if having these symptoms: Chest, neck, jaw, arm pain, difficulty breathing, cool, moist skin, fainting, altered MS.   Will comply with recommendation: Yes  If further questions/concerns or if symptoms do not improve, worsen or new  symptoms develop, call your PCP or [facility] as soon as possible.      Guideline used:Heart Rate Problems.  Telephone Triage Protocols for Nurses, Fifth Edition, Monet Groves RN

## 2017-12-01 RX ORDER — CLONAZEPAM 0.5 MG/1
0.5 TABLET ORAL
Qty: 30 TABLET | Refills: 0 | Status: SHIPPED | OUTPATIENT
Start: 2017-12-01 | End: 2017-12-11

## 2017-12-01 NOTE — TELEPHONE ENCOUNTER
Attempted to call patient at home number [phone], left message on voicemail identified as Sharita Joseph requesting call back to clinic to discuss.  I also routed/flagged for TC to fax Rx.  Sharita Jasmine RN  [facility]

## 2017-12-01 NOTE — TELEPHONE ENCOUNTER
Patient returned call to RN line and left message for call back to her.    I called her back, advised her Klonopin Rx is written, assume it has been or will shortly be faxed, should be able to pick this up later this afternoon.  Keep 12/11 follow up, to ER if worse.  Patient verbalized understanding of and agreement with plan.  Re-flagged high priority for TC to fax.  Sharita Jasmine RN  [facility]

## 2017-12-01 NOTE — TELEPHONE ENCOUNTER
Klonopin prescription approved for 30 pills. In team 2 TC's basket.  F/u with me as scheduled. If symptoms get worse meantime then get seen in the ER.     Arlette Ramirez MD.   Family Physician.  [facility].

## 2017-12-11 ENCOUNTER — OFFICE VISIT (OUTPATIENT)
Dept: FAMILY MEDICINE | Facility: CLINIC | Age: 52
End: 2017-12-11
Payer: COMMERCIAL

## 2017-12-11 VITALS
DIASTOLIC BLOOD PRESSURE: 58 MMHG | BODY MASS INDEX: 20.68 KG/M2 | HEART RATE: 72 BPM | SYSTOLIC BLOOD PRESSURE: 94 MMHG | TEMPERATURE: 97.9 F | WEIGHT: 138 LBS

## 2017-12-11 DIAGNOSIS — R00.2 PALPITATIONS: ICD-10-CM

## 2017-12-11 DIAGNOSIS — F41.9 ANXIETY: ICD-10-CM

## 2017-12-11 DIAGNOSIS — F32.5 MAJOR DEPRESSION IN COMPLETE REMISSION (H): Primary | ICD-10-CM

## 2017-12-11 PROCEDURE — 99214 OFFICE O/P EST MOD 30 MIN: CPT | Performed by: FAMILY MEDICINE

## 2017-12-11 RX ORDER — CLONAZEPAM 0.5 MG/1
0.5 TABLET ORAL
Qty: 30 TABLET | Refills: 0 | Status: SHIPPED | OUTPATIENT
Start: 2017-12-27 | End: 2018-02-02

## 2017-12-11 ASSESSMENT — ANXIETY QUESTIONNAIRES
2. NOT BEING ABLE TO STOP OR CONTROL WORRYING: NOT AT ALL
5. BEING SO RESTLESS THAT IT IS HARD TO SIT STILL: NOT AT ALL
GAD7 TOTAL SCORE: 9
6. BECOMING EASILY ANNOYED OR IRRITABLE: NEARLY EVERY DAY
7. FEELING AFRAID AS IF SOMETHING AWFUL MIGHT HAPPEN: SEVERAL DAYS
3. WORRYING TOO MUCH ABOUT DIFFERENT THINGS: NOT AT ALL
1. FEELING NERVOUS, ANXIOUS, OR ON EDGE: MORE THAN HALF THE DAYS

## 2017-12-11 ASSESSMENT — PATIENT HEALTH QUESTIONNAIRE - PHQ9: 5. POOR APPETITE OR OVEREATING: NEARLY EVERY DAY

## 2017-12-11 NOTE — NURSING NOTE
"Chief Complaint   Patient presents with     Recheck Medication     Klonopin        Initial BP 94/58  Pulse 72  Temp 97.9  F (36.6  C) (Oral)  Wt 138 lb (62.6 kg)  LMP 08/13/2014  BMI 20.68 kg/m2 Estimated body mass index is 20.68 kg/(m^2) as calculated from the following:    Height as of 7/12/17: 5' 8.5\" (1.74 m).    Weight as of this encounter: 138 lb (62.6 kg).  Medication Reconciliation: complete  Lashaun Silva MA    "

## 2017-12-11 NOTE — PROGRESS NOTES
SUBJECTIVE:   Sharita Joseph is a 51 year old female who presents to clinic today for the following health issues:    Medication Followup of Klonopin     Taking Medication as prescribed: yes    Side Effects:  None    Medication Helping Symptoms:  yes     She is taking Zoloft 100 mg daily for her mood symptoms.  This helps with depression and upto certain extent with anxiety.     She was taking Klonopin and then requested to be on Hydroxyzine for her anxiety and sleep.    Hydroxyzine did not help much, also she had one or two episodes of palpitations that she describes as :  While she was sitting quetly, she suddenly felt that her heart was beating fast, lasted for few seconds and resolved on its own, not associated with sweating, lightheadedness, chest pain or SOB.   It happened twice. Then she stopped taking hydroxyzine. She does not recall having the same symptoms again.     She is worried as her mother has CAD and her palpitations could be due to the CAD.     Also , pt is traveling to [facility] to visit her friend who recently delivered. She will be traveling from Dec 28th through Jan 9th. She needs her Klonopin prescription refilled sooner than her due date for refill.     Problem list and histories reviewed & adjusted, as indicated.  Additional history: as documented    Patient Active Problem List   Diagnosis     Nonspecific abnormal results of liver function study     Allergic rhinitis due to other allergen     Premenstrual tension syndrome     Other chronic allergic conjunctivitis     Chronic rhinitis     Excessive or frequent menstruation     CARDIOVASCULAR SCREENING; LDL GOAL LESS THAN 160     Anxiety     Health Care Home     Major depression in complete remission (H)     DUB (dysfunctional uterine bleeding)     Vaginal burning     Dermal nevus     Epidermoid cyst of skin     Seborrheic keratosis     FH: breast cancer     Past Surgical History:   Procedure Laterality Date     C/SECTION, LOW TRANSVERSE       , Low Transverse     D & C       DILATION AND CURETTAGE, HYSTEROSCOPY DIAGNOSTIC, COMBINED  2014    Procedure: COMBINED DILATION AND CURETTAGE, HYSTEROSCOPY DIAGNOSTIC;  Surgeon: Cal Covington MD;  Location: [facility] OR     ENDOSCOPY       TONSILLECTOMY         Social History   Substance Use Topics     Smoking status: Former Smoker     Quit date: 2000     Smokeless tobacco: Never Used     Alcohol use Yes      Comment: rare     Family History   Problem Relation Age of Onset     OSTEOPOROSIS Mother      Breast Cancer Mother      Dx over 20 years ago, age 46     Arthritis Mother      Psychotic Disorder Mother      HEART DISEASE Mother      Lipids Father      Hypertension Father      CEREBROVASCULAR DISEASE Father      HEART DISEASE Father      Dementia Father      CANCER Maternal Grandfather      lung, 25 years ago     Alzheimer Disease Paternal Grandmother      possible     Cancer - colorectal Paternal Grandfather      age of dx ?     Obesity Brother      Hypertension Brother      Melanoma No family hx of          Current Outpatient Prescriptions   Medication Sig Dispense Refill     clonazePAM (KLONOPIN) 0.5 MG tablet Take 1 tablet (0.5 mg) by mouth nightly as needed for anxiety 30 tablet 0     doxycycline monohydrate 100 MG capsule Take 1 capsule (100 mg) by mouth daily with food 60 capsule 1     sertraline (ZOLOFT) 100 MG tablet Take 1 tablet (100 mg) by mouth daily 90 tablet 1     SUMAtriptan (IMITREX) 50 MG tablet   0     cyclobenzaprine (FLEXERIL) 10 MG tablet Take 1 tablet (10 mg) by mouth nightly as needed for muscle spasms 14 tablet 0     Ibuprofen (ADVIL PO) Take 200 mg by mouth       ondansetron (ZOFRAN ODT) 4 MG ODT tab Take 1-2 tablets (4-8 mg) by mouth every 8 hours as needed for nausea 20 tablet 0     conjugated estrogens (PREMARIN) vaginal cream Place vaginally twice a week       Magnesium 250 MG tablet Take 1 tablet by mouth daily. 100 tablet 3     [DISCONTINUED] ClonazePAM  (KLONOPIN PO) Take 0.5 mg by mouth as needed for anxiety       BP Readings from Last 3 Encounters:   12/11/17 94/58   08/18/17 99/64   07/12/17 (!) 88/63    Wt Readings from Last 3 Encounters:   12/11/17 138 lb (62.6 kg)   08/18/17 130 lb (59 kg)   07/12/17 131 lb 11.2 oz (59.7 kg)                  Labs reviewed in EPIC        Reviewed and updated as needed this visit by clinical staff     Reviewed and updated as needed this visit by Provider         ROS:  Constitutional, HEENT, cardiovascular, pulmonary, gi and gu systems are negative, except as otherwise noted.      OBJECTIVE:   BP 94/58  Pulse 72  Temp 97.9  F (36.6  C) (Oral)  Wt 138 lb (62.6 kg)  LMP 08/13/2014  BMI 20.68 kg/m2  Body mass index is 20.68 kg/(m^2).  GENERAL: healthy, alert and no distress  CVS: normal rate and rhythm, no murmur.   RS: lungs are clear on both sides.   PSYCH: mentation appears normal, affect normal/bright    ASSESSMENT/PLAN:       ICD-10-CM    1. Major depression in complete remission (H) F32.5    2. Anxiety F41.9 clonazePAM (KLONOPIN) 0.5 MG tablet   3. Palpitations R00.2      - continue zoloft, Klonopin refilled. Can get prescription refilled on Dec 27th 2017.     Palpitations:   - Possible med SE or other pathologies. She has already stopped taking Hydroxyzine. Recent labs ( CBC, CMP, TSH)  reviewed. Reassuring.  Plan: observe.   If palpitations happen again, more often or associated with symptoms of dizziness, CP, SOB etc then I will order  holter monitor .   Explained that her symptoms do no suggest CAD at this point.   Pt verbalized understanding and agree with the plan.      Total visit time 25 mins, more than 50 % time was spent in face to face counseling.     Arlette Ramirez MD  [facility]

## 2017-12-11 NOTE — MR AVS SNAPSHOT
After Visit Summary   12/11/2017    Sharita Joseph    MRN: [id number]           Patient Information     Date Of Birth          1965        Visit Information        Provider Department      12/11/2017 2:20 PM Arlette Ramirez MD [facility]        Today's Diagnoses     Major depression in complete remission (H)    -  1    Anxiety           Follow-ups after your visit        Your next 10 appointments already scheduled     Dec 15, 2017 10:00 AM CST   [facility] Reg[facility]   [facility]    [address] [address]   [phone]            Jan 16, 2018  1:00 PM CST   New Visit with Reji Rawls MD   [facility]    [address] [address]   [phone]              Who to contact     If you have questions or need follow up information about today's clinic visit or your schedule please contact [facility] directly at [phone].  Normal or non-critical lab and imaging results will be communicated to you by [facility], letter or phone within 4 business days after the clinic has received the results. If you do not hear from us within 7 days, please contact the clinic through [facility] or phone. If you have a critical or abnormal lab result, we will notify you by phone as soon as possible.  Submit refill requests through [employer] or call your pharmacy and they will forward the refill request to us. Please allow 3 business days for your refill to be completed.          Additional Information About Your Visit        [facility] Information     [employer] gives you secure access to your electronic health record. If you see a primary care provider, you can also send messages to your care team and make appointments. If you have questions, please call your primary care clinic.  If you do not have a primary care  provider, please call [phone] and they will assist you.        Care EveryWhere ID     This is your Care EveryWhere ID. This could be used by other organizations to access your [facility] medical records  [id number]        Your Vitals Were     Pulse Temperature Last Period BMI (Body Mass Index)          72 97.9  F (36.6  C) (Oral) 08/13/2014 20.68 kg/m2         Blood Pressure from Last 3 Encounters:   12/11/17 94/58   08/18/17 99/64   07/12/17 (!) 88/63    Weight from Last 3 Encounters:   12/11/17 138 lb (62.6 kg)   08/18/17 130 lb (59 kg)   07/12/17 131 lb 11.2 oz (59.7 kg)              Today, you had the following     No orders found for display         Where to get your medicines      Some of these will need a paper prescription and others can be bought over the counter.  Ask your nurse if you have questions.     Bring a paper prescription for each of these medications     clonazePAM 0.5 MG tablet          Primary Care Provider Office Phone # Fax #    Arlette Fady Ramirez [phone]       [address] [facility] [address]        Equal Access to Services     VAHE OLIVER AH: Hadii rosalba lees Soann, waaxda luqadaha, qaybta kaalmada adedick, mikey rodriguez. So [facility] [phone].    ATENCIÓN: Si habla español, tiene a jeff disposición servicios gratuitos de asistencia lingüística. Llame al [phone].    We comply with applicable federal civil rights laws and [place] laws. We do not discriminate on the basis of race, color, national origin, age, disability, sex, sexual orientation, or gender identity.            Thank you!     Thank you for choosing [facility]  for your care. Our goal is always to provide you with excellent care. Hearing back from our patients is one way we can continue to improve our services. Please take a few minutes to complete the written survey that you may receive in the mail after your visit with  us. Thank you!             Your Updated Medication List - Protect others around you: Learn how to safely use, store and throw away your medicines at www.disposemymeds.org.          This list is accurate as of: 12/11/17  3:08 PM.  Always use your most recent med list.                   Brand Name Dispense Instructions for use Diagnosis    ADVIL PO      Take 200 mg by mouth        clonazePAM 0.5 MG tablet   Start taking on:  12/27/2017    klonoPIN    30 tablet    Take 1 tablet (0.5 mg) by mouth nightly as needed for anxiety    Anxiety       conjugated estrogens cream    PREMARIN     Place vaginally twice a week        cyclobenzaprine 10 MG tablet    FLEXERIL    14 tablet    Take 1 tablet (10 mg) by mouth nightly as needed for muscle spasms    Neck pain       doxycycline monohydrate 100 MG capsule     60 capsule    Take 1 capsule (100 mg) by mouth daily with food    Dermatitis, perioral       magnesium 250 MG tablet     100 tablet    Take 1 tablet by mouth daily.        ondansetron 4 MG ODT tab    ZOFRAN ODT    20 tablet    Take 1-2 tablets (4-8 mg) by mouth every 8 hours as needed for nausea    Nonintractable headache, unspecified chronicity pattern, unspecified headache type       sertraline 100 MG tablet    ZOLOFT    90 tablet    Take 1 tablet (100 mg) by mouth daily    Major depression in complete remission (H)       SUMAtriptan 50 MG tablet    IMITREX

## 2017-12-12 ASSESSMENT — ANXIETY QUESTIONNAIRES: GAD7 TOTAL SCORE: 9

## 2017-12-15 ENCOUNTER — OFFICE VISIT (OUTPATIENT)
Dept: DERMATOLOGY | Facility: CLINIC | Age: 52
End: 2017-12-15
Payer: COMMERCIAL

## 2017-12-15 VITALS — OXYGEN SATURATION: 99 % | DIASTOLIC BLOOD PRESSURE: 63 MMHG | HEART RATE: 71 BPM | SYSTOLIC BLOOD PRESSURE: 97 MMHG

## 2017-12-15 DIAGNOSIS — L30.9 PERIOCULAR DERMATITIS: Primary | ICD-10-CM

## 2017-12-15 PROCEDURE — 99213 OFFICE O/P EST LOW 20 MIN: CPT | Performed by: PHYSICIAN ASSISTANT

## 2017-12-15 NOTE — LETTER
12/15/2017         RE: Sharita Joseph  [address]  [facility] [address]        Dear Colleague,    Thank you for referring your patient, Sharita Joseph, to the [facility]. Please see a copy of my visit note below.    Sharita Joseph is a 52 year old year old female patient here today for recheck periocular dermatitis.  Patient reports that rash restarted in  October and she started taking doxycycline. She is currently on once daily. She reports that rash is clear. She believes have started due to a new eye cream.  Remainder of the HPI, Meds, PMH, Allergies, FH, and SH was reviewed in chart.    Pertinent Hx:  Periorificial dermatitis   Past Medical History:   Diagnosis Date     Allergic rhinitis due to other allergen     Allergies year round, testing positive for mold, dust and boxelder trees.  Did 3years of shots     Depressive disorder, not elsewhere classified     Was Zoloft which stopped working, side effects to celexa, prozac sexual side effects.       Past Surgical History:   Procedure Laterality Date     C/SECTION, LOW TRANSVERSE      , Low Transverse     D & C       DILATION AND CURETTAGE, HYSTEROSCOPY DIAGNOSTIC, COMBINED  2014    Procedure: COMBINED DILATION AND CURETTAGE, HYSTEROSCOPY DIAGNOSTIC;  Surgeon: Cal Covington MD;  Location: [facility] OR     ENDOSCOPY       TONSILLECTOMY          Family History   Problem Relation Age of Onset     OSTEOPOROSIS Mother      Breast Cancer Mother      Dx over 20 years ago, age 46     Arthritis Mother      Psychotic Disorder Mother      HEART DISEASE Mother      Lipids Father      Hypertension Father      CEREBROVASCULAR DISEASE Father      HEART DISEASE Father      Dementia Father      CANCER Maternal Grandfather      lung, 25 years ago     Alzheimer Disease Paternal Grandmother      possible     Cancer - colorectal Paternal Grandfather      age of dx ?     Obesity Brother      Hypertension Brother      Melanoma No  family hx of        Social History     Social History     Marital status:      Spouse name: N/A     Number of children: 1     Years of education: N/A     Occupational History      [employer]     Social History Main Topics     Smoking status: Former Smoker     Quit date: 4/28/2000     Smokeless tobacco: Never Used     Alcohol use Yes      Comment: rare     Drug use: No     Sexual activity: Yes     Partners: Male     Birth control/ protection: Surgical      Comment:  had vasectomy     Other Topics Concern     Parent/Sibling W/ Cabg, Mi Or Angioplasty Before 65f 55m? No     Social History Narrative       Outpatient Encounter Prescriptions as of 12/15/2017   Medication Sig Dispense Refill     [START ON 12/27/2017] clonazePAM (KLONOPIN) 0.5 MG tablet Take 1 tablet (0.5 mg) by mouth nightly as needed for anxiety 30 tablet 0     doxycycline monohydrate 100 MG capsule Take 1 capsule (100 mg) by mouth daily with food 60 capsule 1     sertraline (ZOLOFT) 100 MG tablet Take 1 tablet (100 mg) by mouth daily 90 tablet 1     SUMAtriptan (IMITREX) 50 MG tablet   0     cyclobenzaprine (FLEXERIL) 10 MG tablet Take 1 tablet (10 mg) by mouth nightly as needed for muscle spasms 14 tablet 0     Ibuprofen (ADVIL PO) Take 200 mg by mouth       ondansetron (ZOFRAN ODT) 4 MG ODT tab Take 1-2 tablets (4-8 mg) by mouth every 8 hours as needed for nausea 20 tablet 0     conjugated estrogens (PREMARIN) vaginal cream Place vaginally twice a week       Magnesium 250 MG tablet Take 1 tablet by mouth daily. 100 tablet 3     No facility-administered encounter medications on file as of 12/15/2017.              Review Of Systems  Skin: As above  Eyes: negative  Ears/Nose/Throat: negative  Respiratory: No shortness of breath, dyspnea on exertion, cough, or hemoptysis  Cardiovascular: negative  Gastrointestinal: negative  Genitourinary: negative  Musculoskeletal: negative  Neurologic: negative  Psychiatric:  negative  Hematologic/Lymphatic/Immunologic: negative  Endocrine: negative      O:   NAD, WDWN, Alert & Oriented, Mood & Affect wnl, Vitals stable   Here today alone   BP 97/63  Pulse 71  LMP 08/13/2014  SpO2 99%   General appearance normal   Vitals stable   Alert, oriented and in no acute distress      Face is clear today      Eyes: Conjunctivae/lids:Normal     ENT: Lips    MSK:Normal    Pulm: Breathing Normal    Neuro/Psych: Orientation:Normal; Mood/Affect:Normal  A/P:  1. Periocular Dermatitis  Decrease to doxycycline once every other day for next to two weeks and then discontinue.   Use mild cleansers and moisturizers.   Recheck as needed.     Again, thank you for allowing me to participate in the care of your patient.        Sincerely,        Regina Martinez PA-C

## 2017-12-15 NOTE — MR AVS SNAPSHOT
After Visit Summary   12/15/2017    Sharita Joseph    MRN: [id number]           Patient Information     Date Of Birth          1965        Visit Information        Provider Department      12/15/2017 10:00 AM Regina Peace PA-C [facility]         Follow-ups after your visit        Your next 10 appointments already scheduled     Jan 16, 2018  1:00 PM CST   New Visit with Reji Rawls MD   [facility] ([facility]    [address] [id number] [phone]              Who to contact     If you have questions or need follow up information about today's clinic visit or your schedule please contact [facility] directly at [phone].  Normal or non-critical lab and imaging results will be communicated to you by MyChart, letter or phone within 4 business days after the clinic has received the results. If you do not hear from us within 7 days, please contact the clinic through MyChart or phone. If you have a critical or abnormal lab result, we will notify you by phone as soon as possible.  Submit refill requests through [employer] or call your pharmacy and they will forward the refill request to us. Please allow 3 business days for your refill to be completed.          Additional Information About Your Visit        MyChart Information     [employer] gives you secure access to your electronic health record. If you see a primary care provider, you can also send messages to your care team and make appointments. If you have questions, please call your primary care clinic.  If you do not have a primary care provider, please call [phone] and they will assist you.        Care EveryWhere ID     This is your Care EveryWhere ID. This could be used by other organizations to access your [facility] medical records  [id number]        Your Vitals Were     Pulse Last Period Pulse Oximetry             71 08/13/2014 99%           Blood Pressure from Last 3 Encounters:   12/15/17 97/63   12/11/17 94/58   08/18/17 99/64    Weight from Last 3 Encounters:   12/11/17 62.6 kg (138 lb)   08/18/17 59 kg (130 lb)   07/12/17 59.7 kg (131 lb 11.2 oz)              Today, you had the following     No orders found for display       Primary Care Provider Office Phone # Fax #    Arlette Fady Ramirez [phone]       [address] [facility] [place] [address]        Equal Access to Services     [facility]: Hadii rosalba Chiu, walayne munguia, ambika rodriguez, mikey hatch . So [facility] [phone].    ATENCIÓN: Si habla español, tiene a jeff disposición servicios gratuitos de asistencia lingüística. [facility] [phone].    We comply with applicable federal civil rights laws and [place] laws. We do not discriminate on the basis of race, color, national origin, age, disability, sex, sexual orientation, or gender identity.            Thank you!     Thank you for choosing [facility]  for your care. Our goal is always to provide you with excellent care. Hearing back from our patients is one way we can continue to improve our services. Please take a few minutes to complete the written survey that you may receive in the mail after your visit with us. Thank you!             Your Updated Medication List - Protect others around you: Learn how to safely use, store and throw away your medicines at www.disposemymeds.org.          This list is accurate as of: 12/15/17  1:18 PM.  Always use your most recent med list.                   Brand Name Dispense Instructions for use Diagnosis    ADVIL PO      Take 200 mg by mouth        clonazePAM 0.5 MG tablet   Start taking on:  12/27/2017    klonoPIN    30 tablet    Take 1 tablet (0.5 mg) by mouth nightly as needed for anxiety    Anxiety       conjugated estrogens cream    PREMARIN     Place vaginally twice a week         cyclobenzaprine 10 MG tablet    FLEXERIL    14 tablet    Take 1 tablet (10 mg) by mouth nightly as needed for muscle spasms    Neck pain       doxycycline monohydrate 100 MG capsule     60 capsule    Take 1 capsule (100 mg) by mouth daily with food    Dermatitis, perioral       magnesium 250 MG tablet     100 tablet    Take 1 tablet by mouth daily.        ondansetron 4 MG ODT tab    ZOFRAN ODT    20 tablet    Take 1-2 tablets (4-8 mg) by mouth every 8 hours as needed for nausea    Nonintractable headache, unspecified chronicity pattern, unspecified headache type       sertraline 100 MG tablet    ZOLOFT    90 tablet    Take 1 tablet (100 mg) by mouth daily    Major depression in complete remission (H)       SUMAtriptan 50 MG tablet    IMITREX

## 2017-12-15 NOTE — NURSING NOTE
"Initial BP 97/63  Pulse 71  LMP 08/13/2014  SpO2 99% Estimated body mass index is 20.68 kg/(m^2) as calculated from the following:    Height as of 7/12/17: 1.74 m (5' 8.5\").    Weight as of 12/11/17: 62.6 kg (138 lb). .    Varsha Veronica LPN    "

## 2017-12-15 NOTE — PROGRESS NOTES
Sharita Joseph is a 52 year old year old female patient here today for recheck periocular dermatitis.  Patient reports that rash restarted in  October and she started taking doxycycline. She is currently on once daily. She reports that rash is clear. She believes have started due to a new eye cream.  Remainder of the HPI, Meds, PMH, Allergies, FH, and SH was reviewed in chart.    Pertinent Hx:  Periorificial dermatitis   Past Medical History:   Diagnosis Date     Allergic rhinitis due to other allergen     Allergies year round, testing positive for mold, dust and boxelder trees.  Did 3years of shots     Depressive disorder, not elsewhere classified     Was Zoloft which stopped working, side effects to celexa, prozac sexual side effects.       Past Surgical History:   Procedure Laterality Date     C/SECTION, LOW TRANSVERSE      , Low Transverse     D & C       DILATION AND CURETTAGE, HYSTEROSCOPY DIAGNOSTIC, COMBINED  2014    Procedure: COMBINED DILATION AND CURETTAGE, HYSTEROSCOPY DIAGNOSTIC;  Surgeon: Cal Covington MD;  Location: [facility]     ENDOSCOPY       TONSILLECTOMY          Family History   Problem Relation Age of Onset     OSTEOPOROSIS Mother      Breast Cancer Mother      Dx over 20 years ago, age 46     Arthritis Mother      Psychotic Disorder Mother      HEART DISEASE Mother      Lipids Father      Hypertension Father      CEREBROVASCULAR DISEASE Father      HEART DISEASE Father      Dementia Father      CANCER Maternal Grandfather      lung, 25 years ago     Alzheimer Disease Paternal Grandmother      possible     Cancer - colorectal Paternal Grandfather      age of dx ?     Obesity Brother      Hypertension Brother      Melanoma No family hx of        Social History     Social History     Marital status:      Spouse name: N/A     Number of children: 1     Years of education: N/A     Occupational History      [employer]     Social History Main Topics     Smoking status:  Former Smoker     Quit date: 4/28/2000     Smokeless tobacco: Never Used     Alcohol use Yes      Comment: rare     Drug use: No     Sexual activity: Yes     Partners: Male     Birth control/ protection: Surgical      Comment:  had vasectomy     Other Topics Concern     Parent/Sibling W/ Cabg, Mi Or Angioplasty Before 65f 55m? No     Social History Narrative       Outpatient Encounter Prescriptions as of 12/15/2017   Medication Sig Dispense Refill     [START ON 12/27/2017] clonazePAM (KLONOPIN) 0.5 MG tablet Take 1 tablet (0.5 mg) by mouth nightly as needed for anxiety 30 tablet 0     doxycycline monohydrate 100 MG capsule Take 1 capsule (100 mg) by mouth daily with food 60 capsule 1     sertraline (ZOLOFT) 100 MG tablet Take 1 tablet (100 mg) by mouth daily 90 tablet 1     SUMAtriptan (IMITREX) 50 MG tablet   0     cyclobenzaprine (FLEXERIL) 10 MG tablet Take 1 tablet (10 mg) by mouth nightly as needed for muscle spasms 14 tablet 0     Ibuprofen (ADVIL PO) Take 200 mg by mouth       ondansetron (ZOFRAN ODT) 4 MG ODT tab Take 1-2 tablets (4-8 mg) by mouth every 8 hours as needed for nausea 20 tablet 0     conjugated estrogens (PREMARIN) vaginal cream Place vaginally twice a week       Magnesium 250 MG tablet Take 1 tablet by mouth daily. 100 tablet 3     No facility-administered encounter medications on file as of 12/15/2017.              Review Of Systems  Skin: As above  Eyes: negative  Ears/Nose/Throat: negative  Respiratory: No shortness of breath, dyspnea on exertion, cough, or hemoptysis  Cardiovascular: negative  Gastrointestinal: negative  Genitourinary: negative  Musculoskeletal: negative  Neurologic: negative  Psychiatric: negative  Hematologic/Lymphatic/Immunologic: negative  Endocrine: negative      O:   NAD, WDWN, Alert & Oriented, Mood & Affect wnl, Vitals stable   Here today alone   BP 97/63  Pulse 71  LMP 08/13/2014  SpO2 99%   General appearance normal   Vitals stable   Alert, oriented and  in no acute distress      Face is clear today      Eyes: Conjunctivae/lids:Normal     ENT: Lips    MSK:Normal    Pulm: Breathing Normal    Neuro/Psych: Orientation:Normal; Mood/Affect:Normal  A/P:  1. Periocular Dermatitis  Decrease to doxycycline once every other day for next to two weeks and then discontinue.   Use mild cleansers and moisturizers.   Recheck as needed.

## 2017-12-26 DIAGNOSIS — F32.5 MAJOR DEPRESSION IN COMPLETE REMISSION (H): ICD-10-CM

## 2017-12-28 NOTE — TELEPHONE ENCOUNTER
Requested Prescriptions   Pending Prescriptions Disp Refills     sertraline (ZOLOFT) 100 MG tablet [Pharmacy Med Name: SERTRALINE 100MG TABLETS] 90 tablet 0    Last Written Prescription Date:  8-18-17  Last Fill Quantity: 90,  # refills: 1   Last Office Visit with [facility], [facility] or [facility] prescribing provider:  12-11-17   Future Office Visit:      Sig: TAKE 1 TABLET(100 MG) BY MOUTH DAILY    SSRIs Protocol Passed    12/28/2017  8:56 AM       Passed - PHQ-9 score less than 5 in past 6 months    The PHQ-9 criteria is meant to fail. It requires a PHQ-9 score review         Passed - Patient is age 18 or older       Passed - No active pregnancy on record       Passed - No positive pregnancy test in last 12 months       Passed - Recent (6 mo) or future visit with authorizing provider's specialty    Patient had office visit in the last 6 months or has a visit in the next 30 days with authorizing provider.  See chart review.

## 2017-12-29 RX ORDER — SERTRALINE HYDROCHLORIDE 100 MG/1
TABLET, FILM COATED ORAL
Qty: 90 TABLET | Refills: 0 | OUTPATIENT
Start: 2017-12-29

## 2017-12-29 NOTE — TELEPHONE ENCOUNTER
"Should have refills at requesting pharmacy into February 2018?    No clear plan for follow up noted from 12/11/17 visit.   Notes indicate [place]17[place]18.      On hold an extended period with pharmacy.   RN has patient visit arriving.    \"Refusal\" routed back to pharmacy with note.    Sharita Jasmine RN  [facility]                  "

## 2018-01-09 ENCOUNTER — TELEPHONE (OUTPATIENT)
Dept: DERMATOLOGY | Facility: CLINIC | Age: 53
End: 2018-01-09

## 2018-01-09 NOTE — TELEPHONE ENCOUNTER
Pt called stating that she sees Dr. Rawls and would like to begin botox shots and would like to know if she needs a consult prior to getting the botox injections or can just make an appointment to schedule the botox injections. Please advise.    Nancy Brown  [facility]

## 2018-01-09 NOTE — TELEPHONE ENCOUNTER
"Spoke to patient. \"I want to have Botox done around my eyes for wrinkles...\"     Do you want to see her first for a consult?...    She wants to know what the office visit costs are and also the Botox itself as it would be considered cosmetic.    Please advise. Caity Johansen RN    "

## 2018-01-09 NOTE — TELEPHONE ENCOUNTER
botox is cosmetic       Office visit codes range from 25834, 79116, 62107 depending on what is done at the visit (Routing comment)

## 2018-02-02 DIAGNOSIS — F41.9 ANXIETY: ICD-10-CM

## 2018-02-02 NOTE — TELEPHONE ENCOUNTER
Requested Prescriptions   Pending Prescriptions Disp Refills     clonazePAM (KLONOPIN) 0.5 MG tablet [Pharmacy Med Name: CLONAZEPAM 0.5MG TABLETS] 30 tablet 0     Sig: TAKE 1 TABLET BY MOUTH NIGHTLY AS NEEDED FOR ANXIETY    There is no refill protocol information for this order         Last Written Prescription Date:  12/27/17  Last Fill Quantity: 30,   # refills: 0  Last Office Visit: 12/11/17  Future Office visit:       Routing refill request to provider for review/approval because:  Drug not on the [facility], [facility] or [facility] refill protocol or controlled substance

## 2018-02-05 ENCOUNTER — TELEPHONE (OUTPATIENT)
Dept: FAMILY MEDICINE | Facility: CLINIC | Age: 53
End: 2018-02-05

## 2018-02-05 RX ORDER — CLONAZEPAM 0.5 MG/1
TABLET ORAL
Qty: 30 TABLET | Refills: 0 | Status: SHIPPED | OUTPATIENT
Start: 2018-02-05 | End: 2018-07-20

## 2018-02-05 NOTE — TELEPHONE ENCOUNTER
Prescription approved, in team 2 TC's basket.     Arlette Ramirez MD.   Family Physician.  [facility].

## 2018-02-05 NOTE — TELEPHONE ENCOUNTER
Reason for Call:  Other call back    Detailed comments: someone at work has shingles, patient is worried that she has not been vaccinated for shingles and would like to know what precautions she should take.    Phone Number Patient can be reached at: Home number on file [phone] (home)    Best Time: anytime    Can we leave a detailed message on this number? YES    Call taken on 2/5/2018 at 1:06 PM by Tasia Tafoya

## 2018-02-05 NOTE — LETTER
Sharita,    Previously Shingles vaccines was routinely given for pts age 60 and above.     Shingrix was recently approved for patients age 50 yrs and more for Shingles prevention.   It is fairly new , I need to check if we have it in clinic or not.     I would like you to check with your insurance for coverage for Shingrix or for Zostavax.      Arlette Ramirez MD.   Family Physician.  [facility].

## 2018-02-06 NOTE — TELEPHONE ENCOUNTER
If the coworkers rash is covered and there is not skin to skin contact then there is very low risk of transmission. Also if pt has had chicken pox or has received chicken pox vaccine, then she is at low risk.     Arlette Ramirez MD.   Family Physician.  [facility].

## 2018-02-06 NOTE — TELEPHONE ENCOUNTER
Notified patient of the message below per PCP.    She is wondering if she can get an order for shingle vaccine.    Sariah Escobar RN [facility] Triage.

## 2018-02-06 NOTE — TELEPHONE ENCOUNTER
Called patient at [phone] (home). Left message on voicemail to return phone call to triage.  Sariah Escobar RN [facility] Triage.

## 2018-02-07 NOTE — TELEPHONE ENCOUNTER
Previously Shingles vaccines was routinely given for pts age 60 and above.     Shingrix was recently approved for patients age 50 yrs and more for Shingles prevention.   It is fairly new , I need to check if we have it in clinic or not.     I would like you to check with your insurance for coverage for Shingrix or for Zostavax.      Arlette Ramirez MD.   Family Physician.  [facility].

## 2018-02-12 NOTE — TELEPHONE ENCOUNTER
Patient has not called back.  Letter mailed to patient with the information below per PCP.  Sariah Escobar RN [facility] Triage.

## 2018-03-21 ENCOUNTER — OFFICE VISIT (OUTPATIENT)
Dept: FAMILY MEDICINE | Facility: CLINIC | Age: 53
End: 2018-03-21
Payer: COMMERCIAL

## 2018-03-21 VITALS
HEART RATE: 69 BPM | HEIGHT: 69 IN | WEIGHT: 137 LBS | OXYGEN SATURATION: 98 % | TEMPERATURE: 97.9 F | BODY MASS INDEX: 20.29 KG/M2 | DIASTOLIC BLOOD PRESSURE: 61 MMHG | SYSTOLIC BLOOD PRESSURE: 93 MMHG

## 2018-03-21 DIAGNOSIS — R53.83 FATIGUE, UNSPECIFIED TYPE: Primary | ICD-10-CM

## 2018-03-21 LAB
ALBUMIN SERPL-MCNC: 3.8 G/DL (ref 3.4–5)
ALP SERPL-CCNC: 75 U/L (ref 40–150)
ALT SERPL W P-5'-P-CCNC: 46 U/L (ref 0–50)
ANION GAP SERPL CALCULATED.3IONS-SCNC: 5 MMOL/L (ref 3–14)
AST SERPL W P-5'-P-CCNC: 50 U/L (ref 0–45)
BASOPHILS # BLD AUTO: 0 10E9/L (ref 0–0.2)
BASOPHILS NFR BLD AUTO: 0.4 %
BILIRUB SERPL-MCNC: 0.2 MG/DL (ref 0.2–1.3)
BUN SERPL-MCNC: 13 MG/DL (ref 7–30)
CALCIUM SERPL-MCNC: 9.2 MG/DL (ref 8.5–10.1)
CHLORIDE SERPL-SCNC: 102 MMOL/L (ref 94–109)
CO2 SERPL-SCNC: 32 MMOL/L (ref 20–32)
CREAT SERPL-MCNC: 0.87 MG/DL (ref 0.52–1.04)
DIFFERENTIAL METHOD BLD: NORMAL
EOSINOPHIL # BLD AUTO: 0 10E9/L (ref 0–0.7)
EOSINOPHIL NFR BLD AUTO: 0.6 %
ERYTHROCYTE [DISTWIDTH] IN BLOOD BY AUTOMATED COUNT: 12.6 % (ref 10–15)
GFR SERPL CREATININE-BSD FRML MDRD: 69 ML/MIN/1.7M2
GLUCOSE SERPL-MCNC: 85 MG/DL (ref 70–99)
HCT VFR BLD AUTO: 41.9 % (ref 35–47)
HGB BLD-MCNC: 13.9 G/DL (ref 11.7–15.7)
LYMPHOCYTES # BLD AUTO: 1.6 10E9/L (ref 0.8–5.3)
LYMPHOCYTES NFR BLD AUTO: 31.1 %
MCH RBC QN AUTO: 30.3 PG (ref 26.5–33)
MCHC RBC AUTO-ENTMCNC: 33.2 G/DL (ref 31.5–36.5)
MCV RBC AUTO: 91 FL (ref 78–100)
MONOCYTES # BLD AUTO: 0.4 10E9/L (ref 0–1.3)
MONOCYTES NFR BLD AUTO: 8.6 %
NEUTROPHILS # BLD AUTO: 3 10E9/L (ref 1.6–8.3)
NEUTROPHILS NFR BLD AUTO: 59.3 %
PLATELET # BLD AUTO: 225 10E9/L (ref 150–450)
POTASSIUM SERPL-SCNC: 4 MMOL/L (ref 3.4–5.3)
PROT SERPL-MCNC: 7.1 G/DL (ref 6.8–8.8)
RBC # BLD AUTO: 4.59 10E12/L (ref 3.8–5.2)
SODIUM SERPL-SCNC: 139 MMOL/L (ref 133–144)
WBC # BLD AUTO: 5 10E9/L (ref 4–11)

## 2018-03-21 PROCEDURE — 36415 COLL VENOUS BLD VENIPUNCTURE: CPT | Performed by: FAMILY MEDICINE

## 2018-03-21 PROCEDURE — 85025 COMPLETE CBC W/AUTO DIFF WBC: CPT | Performed by: FAMILY MEDICINE

## 2018-03-21 PROCEDURE — 80053 COMPREHEN METABOLIC PANEL: CPT | Performed by: FAMILY MEDICINE

## 2018-03-21 PROCEDURE — 99213 OFFICE O/P EST LOW 20 MIN: CPT | Performed by: FAMILY MEDICINE

## 2018-03-21 NOTE — PROGRESS NOTES
SUBJECTIVE:   Sharita Joseph is a 52 year old female who presents to clinic today for the following health issues:      Concern - Fatigue  Onset: 1 week ago    Description:   Started with abdominal pain, since has had Fatigue, and headaches off and on.    Intensity: moderate    Progression of Symptoms:  same    Accompanying Signs & Symptoms:  none    Previous history of similar problem:   none    Precipitating factors:   Worsened by: Was in [facility] for a week prior to this.    Alleviating factors:  Improved by: none  Therapies Tried and outcome: none    She was in [place]. Traveled from 03/09- 03/15.   The day before her trip back home she started abdominal symptoms:   Burning pain on the right upper abdominal area that moved to the middle of the abdomen.   She did not have BM.   She took Milk of magnesia, pain has resolved after having BM.      She was lightheaded.   She could not work on Monday.   Fatigued yesterday, had to lay down in the morning. She could work for a little while.     Abdominal pain has resolved, no n/v, BMs normal. No blood or mucus.   Only concern is fatigue at this point.   She was bit by mosquitoes when she was in [place]. She did put insect repellent. Concerned about Zika virus infection.    Denies rash, joint pains, fever, headache, no redness of eyes.     Problem list and histories reviewed & adjusted, as indicated.  Additional history: as documented    Patient Active Problem List   Diagnosis     Nonspecific abnormal results of liver function study     Allergic rhinitis due to other allergen     Premenstrual tension syndrome     Other chronic allergic conjunctivitis     Chronic rhinitis     Excessive or frequent menstruation     CARDIOVASCULAR SCREENING; LDL GOAL LESS THAN 160     Anxiety     Health Care Home     Major depression in complete remission (H)     DUB (dysfunctional uterine bleeding)     Vaginal burning     Dermal nevus     Epidermoid cyst of skin     Seborrheic keratosis      FH: breast cancer     Past Surgical History:   Procedure Laterality Date     C/SECTION, LOW TRANSVERSE      , Low Transverse     D & C       DILATION AND CURETTAGE, HYSTEROSCOPY DIAGNOSTIC, COMBINED  2014    Procedure: COMBINED DILATION AND CURETTAGE, HYSTEROSCOPY DIAGNOSTIC;  Surgeon: Cal Covington MD;  Location: [facility]     ENDOSCOPY       TONSILLECTOMY         Social History   Substance Use Topics     Smoking status: Former Smoker     Quit date: 2000     Smokeless tobacco: Never Used     Alcohol use Yes      Comment: rare     Family History   Problem Relation Age of Onset     OSTEOPOROSIS Mother      Breast Cancer Mother      Dx over 20 years ago, age 46     Arthritis Mother      Psychotic Disorder Mother      HEART DISEASE Mother      Lipids Father      Hypertension Father      CEREBROVASCULAR DISEASE Father      HEART DISEASE Father      Dementia Father      CANCER Maternal Grandfather      lung, 25 years ago     Alzheimer Disease Paternal Grandmother      possible     Cancer - colorectal Paternal Grandfather      age of dx ?     Obesity Brother      Hypertension Brother      Melanoma No family hx of          Current Outpatient Prescriptions   Medication Sig Dispense Refill     clonazePAM (KLONOPIN) 0.5 MG tablet TAKE 1 TABLET BY MOUTH NIGHTLY AS NEEDED FOR ANXIETY 30 tablet 0     sertraline (ZOLOFT) 100 MG tablet Take 1 tablet (100 mg) by mouth daily 90 tablet 1     Ibuprofen (ADVIL PO) Take 200 mg by mouth       conjugated estrogens (PREMARIN) vaginal cream Place vaginally twice a week       Magnesium 250 MG tablet Take 1 tablet by mouth daily. 100 tablet 3     Allergies   Allergen Reactions     Adhesive Tape      Dust Mites      dust     Gluten Meal      Gluten intolerance     Trees      Recent Labs   Lab Test  17   0721  17   1014  05/10/17   1517  16   1441   14   0912  11   0705   LDL  104*   --    --    --    --    --   89   HDL  95   --     "--    --    --    --   78   TRIG  56   --    --    --    --    --   79   ALT   --   36  39  34   < >   --    --    CR   --   0.86  0.96  0.84   < >   --    --    GFRESTIMATED   --   70  62  71   < >   --    --    GFRESTBLACK   --   84  74  86   < >   --    --    POTASSIUM   --   3.9  4.7  4.1   < >   --    --    TSH   --    --    --   0.94   --   1.19   --     < > = values in this interval not displayed.      BP Readings from Last 3 Encounters:   03/21/18 93/61   12/15/17 97/63   12/11/17 94/58    Wt Readings from Last 3 Encounters:   03/21/18 137 lb (62.1 kg)   12/11/17 138 lb (62.6 kg)   08/18/17 130 lb (59 kg)                  Labs reviewed in EPIC    Reviewed and updated as needed this visit by clinical staff  Tobacco  Allergies  Meds  Med Hx  Surg Hx  Fam Hx  Soc Hx      Reviewed and updated as needed this visit by Provider         ROS:  Constitutional, HEENT, cardiovascular, pulmonary, gi and gu systems are negative, except as otherwise noted.    OBJECTIVE:     BP 93/61 (BP Location: Right arm, Patient Position: Sitting, Cuff Size: Adult Regular)  Pulse 69  Temp 97.9  F (36.6  C) (Oral)  Ht 5' 8.75\" (1.746 m)  Wt 137 lb (62.1 kg)  LMP 08/27/2014  SpO2 98%  BMI 20.38 kg/m2  Body mass index is 20.38 kg/(m^2).  GENERAL: healthy, alert and no distress  HENT: ear canals and TM's normal, nose and mouth without ulcers or lesions  NECK: no adenopathy, no asymmetry, masses, or scars and thyroid normal to palpation  RESP: lungs clear to auscultation - no rales, rhonchi or wheezes  CV: regular rate and rhythm, normal S1 S2, no S3 or S4  ABDOMEN: soft, nontender, no hepatosplenomegaly, no masses and bowel sounds normal  MS: no gross musculoskeletal defects noted, no edema    Results for orders placed or performed in visit on 03/21/18   CBC with platelets and differential   Result Value Ref Range    WBC 5.0 4.0 - 11.0 10e9/L    RBC Count 4.59 3.8 - 5.2 10e12/L    Hemoglobin 13.9 11.7 - 15.7 g/dL    Hematocrit " 41.9 35.0 - 47.0 %    MCV 91 78 - 100 fl    MCH 30.3 26.5 - 33.0 pg    MCHC 33.2 31.5 - 36.5 g/dL    RDW 12.6 10.0 - 15.0 %    Platelet Count 225 150 - 450 10e9/L    Diff Method Automated Method     % Neutrophils 59.3 %    % Lymphocytes 31.1 %    % Monocytes 8.6 %    % Eosinophils 0.6 %    % Basophils 0.4 %    Absolute Neutrophil 3.0 1.6 - 8.3 10e9/L    Absolute Lymphocytes 1.6 0.8 - 5.3 10e9/L    Absolute Monocytes 0.4 0.0 - 1.3 10e9/L    Absolute Eosinophils 0.0 0.0 - 0.7 10e9/L    Absolute Basophils 0.0 0.0 - 0.2 10e9/L   Comprehensive metabolic panel (BMP + Alb, Alk Phos, ALT, AST, Total. Bili, TP)   Result Value Ref Range    Sodium 139 133 - 144 mmol/L    Potassium 4.0 3.4 - 5.3 mmol/L    Chloride 102 94 - 109 mmol/L    Carbon Dioxide 32 20 - 32 mmol/L    Anion Gap 5 3 - 14 mmol/L    Glucose 85 70 - 99 mg/dL    Urea Nitrogen 13 7 - 30 mg/dL    Creatinine 0.87 0.52 - 1.04 mg/dL    GFR Estimate 69 >60 mL/min/1.7m2    GFR Estimate If Black 83 >60 mL/min/1.7m2    Calcium 9.2 8.5 - 10.1 mg/dL    Bilirubin Total 0.2 0.2 - 1.3 mg/dL    Albumin 3.8 3.4 - 5.0 g/dL    Protein Total 7.1 6.8 - 8.8 g/dL    Alkaline Phosphatase 75 40 - 150 U/L    ALT 46 0 - 50 U/L    AST 50 (H) 0 - 45 U/L       ASSESSMENT/PLAN:         ICD-10-CM    1. Fatigue, unspecified type R53.83 CBC with platelets and differential     Comprehensive metabolic panel (BMP + Alb, Alk Phos, ALT, AST, Total. Bili, TP)     CANCELED: Zika Virus     Called [facility] and pt's symptoms reviewed. Only Fatigue does not qualify for testing for ZIKA infection.  This was explained to pt.   Labs reassuring.   At this point seems like self limiting illness: rest, hydration, expect to improve in a week -10 days. F/u if symptoms get worse or do not resolve.   Pt verbalized understanding and agreeable with the plan.       Arlette Ramirez MD  [facility]

## 2018-03-21 NOTE — MR AVS SNAPSHOT
"              After Visit Summary   3/21/2018    Sharita Joseph    MRN: [id number]           Patient Information     Date Of Birth          1965        Visit Information        Provider Department      3/21/2018 3:00 PM Arlette Ramirez MD [facility]        Today's Diagnoses     Fatigue, unspecified type    -  1       Follow-ups after your visit        Who to contact     If you have questions or need follow up information about today's clinic visit or your schedule please contact [facility] directly at [phone].  Normal or non-critical lab and imaging results will be communicated to you by PerBluehart, letter or phone within 4 business days after the clinic has received the results. If you do not hear from us within 7 days, please contact the clinic through [employer] or phone. If you have a critical or abnormal lab result, we will notify you by phone as soon as possible.  Submit refill requests through [employer] or call your pharmacy and they will forward the refill request to us. Please allow 3 business days for your refill to be completed.          Additional Information About Your Visit        MyChart Information     [employer] gives you secure access to your electronic health record. If you see a primary care provider, you can also send messages to your care team and make appointments. If you have questions, please call your primary care clinic.  If you do not have a primary care provider, please call [phone] and they will assist you.        Care EveryWhere ID     This is your Care EveryWhere ID. This could be used by other organizations to access your [place] medical records  [id number]        Your Vitals Were     Pulse Temperature Height Last Period Pulse Oximetry BMI (Body Mass Index)    69 97.9  F (36.6  C) (Oral) 5' 8.75\" (1.746 m) 08/27/2014 98% 20.38 kg/m2       Blood Pressure from Last 3 Encounters:   03/21/18 93/61   12/15/17 97/63   12/11/17 " 94/58    Weight from Last 3 Encounters:   03/21/18 137 lb (62.1 kg)   12/11/17 138 lb (62.6 kg)   08/18/17 130 lb (59 kg)              We Performed the Following     CBC with platelets and differential     Comprehensive metabolic panel (BMP + Alb, Alk Phos, ALT, AST, Total. Bili, TP)        Primary Care Provider Office Phone # Fax #    Arlette Fady Ramirez [phone]       [address] [facility] [address]        Equal Access to Services     [facility]: Hadii aad ku hadasho Soomaali, waaxda luqadaha, qaybta kaalmada adeegyakelly, mikey hatch . So [facility] [phone].    ATENCIÓN: Si habla español, tiene a jeff disposición servicios gratuitos de asistencia lingüística. [facility] [phone].    We comply with applicable federal civil rights laws and [place] laws. We do not discriminate on the basis of race, color, national origin, age, disability, sex, sexual orientation, or gender identity.            Thank you!     Thank you for choosing [facility]  for your care. Our goal is always to provide you with excellent care. Hearing back from our patients is one way we can continue to improve our services. Please take a few minutes to complete the written survey that you may receive in the mail after your visit with us. Thank you!             Your Updated Medication List - Protect others around you: Learn how to safely use, store and throw away your medicines at www.disposemymeds.org.          This list is accurate as of 3/21/18  4:35 PM.  Always use your most recent med list.                   Brand Name Dispense Instructions for use Diagnosis    ADVIL PO      Take 200 mg by mouth        clonazePAM 0.5 MG tablet    klonoPIN    30 tablet    TAKE 1 TABLET BY MOUTH NIGHTLY AS NEEDED FOR ANXIETY    Anxiety       conjugated estrogens cream    PREMARIN     Place vaginally twice a week        magnesium 250 MG tablet     100 tablet    Take 1  tablet by mouth daily.        sertraline 100 MG tablet    ZOLOFT    90 tablet    Take 1 tablet (100 mg) by mouth daily    Major depression in complete remission (H)

## 2018-03-22 NOTE — PROGRESS NOTES
Dear Sharita Joseph,     Your sodium, potassium, glucose, calcium, kidney function are NORMAL. Liver enzymes are stable.     Arlette Ramirez MD.   Family Physician.  [facility].

## 2018-04-18 ENCOUNTER — TELEPHONE (OUTPATIENT)
Dept: FAMILY MEDICINE | Facility: CLINIC | Age: 53
End: 2018-04-18

## 2018-04-18 DIAGNOSIS — F32.5 MAJOR DEPRESSION IN COMPLETE REMISSION (H): Primary | ICD-10-CM

## 2018-04-18 NOTE — TELEPHONE ENCOUNTER
"Requested Prescriptions   Pending Prescriptions Disp Refills     sertraline (ZOLOFT) 100 MG tablet [Pharmacy Med Name: SERTRALINE 100MG TABLETS] 90 tablet 0    A prescription for  Last Written Prescription Date:  8/18/17  Last Fill Quantity: 90,  # refills: 1   Last office visit: 3/21/2018 with prescribing provider:     Future Office Visit:     Sig: TAKE 1 TABLET(100 MG) BY MOUTH DAILY    SSRIs Protocol Failed    4/18/2018  3:28 PM       Failed - PHQ-9 score less than 5 in past 6 months    Please review last PHQ-9 score.          Passed - Patient is age 18 or older       Passed - No active pregnancy on record       Passed - No positive pregnancy test in last 12 months       Passed - Recent (6 mo) or future (30 days) visit within the authorizing provider's specialty    Patient had office visit in the last 6 months or has a visit in the next 30 days with authorizing provider or within the authorizing provider's specialty.  See \"Patient Info\" tab in inbasket, or \"Choose Columns\" in Meds & Orders section of the refill encounter.              "

## 2018-04-19 RX ORDER — SERTRALINE HYDROCHLORIDE 100 MG/1
TABLET, FILM COATED ORAL
Qty: 90 TABLET | Refills: 0 | Status: SHIPPED | OUTPATIENT
Start: 2018-04-19 | End: 2018-07-05

## 2018-04-19 NOTE — TELEPHONE ENCOUNTER
PHQ-9 score:    PHQ-9 SCORE 7/12/2017   Total Score -   Total Score 4       Routing refill request to provider for review/approval because:  Overdue for PHQ 9.  Sariah Escobar RN [facility] Triage.

## 2018-04-19 NOTE — TELEPHONE ENCOUNTER
Medication refill approved, get PHQ 9 done over phone.     Arlette Ramirez MD.   Family Physician.  [facility].

## 2018-04-23 NOTE — TELEPHONE ENCOUNTER
Left patient voicemail to call back to do PHQ9 over the phone.  Seema Purdy, [facility]

## 2018-04-25 NOTE — TELEPHONE ENCOUNTER
Patient states that she is returning a call from Dr. Ramirez's nurse. She states she will be by her phone for the rest of the day.    Thanks!!  Basil Finley  Patient Representative

## 2018-07-05 ENCOUNTER — OFFICE VISIT (OUTPATIENT)
Dept: FAMILY MEDICINE | Facility: CLINIC | Age: 53
End: 2018-07-05
Payer: COMMERCIAL

## 2018-07-05 VITALS
DIASTOLIC BLOOD PRESSURE: 54 MMHG | WEIGHT: 137 LBS | SYSTOLIC BLOOD PRESSURE: 80 MMHG | HEART RATE: 64 BPM | BODY MASS INDEX: 20.38 KG/M2 | OXYGEN SATURATION: 97 % | TEMPERATURE: 97.6 F

## 2018-07-05 DIAGNOSIS — F32.5 MAJOR DEPRESSION IN COMPLETE REMISSION (H): Primary | ICD-10-CM

## 2018-07-05 DIAGNOSIS — Z12.31 ENCOUNTER FOR SCREENING MAMMOGRAM FOR BREAST CANCER: ICD-10-CM

## 2018-07-05 DIAGNOSIS — Z80.3 FH: BREAST CANCER: ICD-10-CM

## 2018-07-05 PROCEDURE — 90471 IMMUNIZATION ADMIN: CPT | Performed by: PHYSICIAN ASSISTANT

## 2018-07-05 PROCEDURE — 90750 HZV VACC RECOMBINANT IM: CPT | Performed by: PHYSICIAN ASSISTANT

## 2018-07-05 PROCEDURE — 99213 OFFICE O/P EST LOW 20 MIN: CPT | Mod: 25 | Performed by: PHYSICIAN ASSISTANT

## 2018-07-05 RX ORDER — SERTRALINE HYDROCHLORIDE 100 MG/1
TABLET, FILM COATED ORAL
Qty: 90 TABLET | Refills: 3 | Status: SHIPPED | OUTPATIENT
Start: 2018-07-05 | End: 2019-09-19

## 2018-07-05 NOTE — NURSING NOTE
Prior to injection verified patient identity using patient's name and date of birth.  Due to injection administration, patient instructed to remain in clinic for 15 minutes  afterwards, and to report any adverse reaction to me immediately.    BETTIE Guzmán MA    VIS for Shinrix given on same date of administration.  Staff signature/Title: BETTIE Guzmán MA

## 2018-07-05 NOTE — MR AVS SNAPSHOT
After Visit Summary   7/5/2018    Sharita Joseph    MRN: [id number]           Patient Information     Date Of Birth          1965        Visit Information        Provider Department      7/5/2018 9:00 AM Wendy Calderón PA-C [facility]        Today's Diagnoses     Encounter for screening mammogram for breast cancer    -  1    Major depression in complete remission (H)        FH: breast cancer           Follow-ups after your visit        Future tests that were ordered for you today     Open Future Orders        Priority Expected Expires Ordered    *MA Screening Digital Bilateral Routine  7/5/2019 7/5/2018            Who to contact     If you have questions or need follow up information about today's clinic visit or your schedule please contact [facility] directly at [phone].  Normal or non-critical lab and imaging results will be communicated to you by MyChart, letter or phone within 4 business days after the clinic has received the results. If you do not hear from us within 7 days, please contact the clinic through MyChart or phone. If you have a critical or abnormal lab result, we will notify you by phone as soon as possible.  Submit refill requests through [employer] or call your pharmacy and they will forward the refill request to us. Please allow 3 business days for your refill to be completed.          Additional Information About Your Visit        MyChart Information     [employer] gives you secure access to your electronic health record. If you see a primary care provider, you can also send messages to your care team and make appointments. If you have questions, please call your primary care clinic.  If you do not have a primary care provider, please call [phone] and they will assist you.        Care EveryWhere ID     This is your Care EveryWhere ID. This could be used by other organizations to access your [facility]  records  [id number]        Your Vitals Were     Pulse Temperature Last Period Pulse Oximetry Breastfeeding? BMI (Body Mass Index)    64 97.6  F (36.4  C) (Oral) 08/27/2014 97% No 20.38 kg/m2       Blood Pressure from Last 3 Encounters:   07/05/18 (!) 80/54   03/21/18 93/61   12/15/17 97/63    Weight from Last 3 Encounters:   07/05/18 137 lb (62.1 kg)   03/21/18 137 lb (62.1 kg)   12/11/17 138 lb (62.6 kg)              We Performed the Following     ZOSTER VACCINE RECOMBINANT ADJUVANTED IM NJX          Where to get your medicines      These medications were sent to [id number] - [place], [place] - [address] [facility] [address] [facility] [address]  [address] [facility], [place] [address]     Phone:  [phone]     sertraline 100 MG tablet          Primary Care Provider Office Phone # Fax #    Arlette Fady Ramirez [phone]       [address] [facility] [address]        Equal Access to Services     [facility]: Hadii aad ku hadasho Soomaali, waaxda luqadaha, qaybta kaalmada adeegyada, waxay lindseyin haydyann gabby hatch . So [facility] [phone].    ATENCIÓN: Si habla español, tiene a jeff disposición servicios gratuitos de asistencia lingüística. Llame al [phone].    We comply with applicable federal civil rights laws and [place] laws. We do not discriminate on the basis of race, color, national origin, age, disability, sex, sexual orientation, or gender identity.            Thank you!     Thank you for choosing [facility]  for your care. Our goal is always to provide you with excellent care. Hearing back from our patients is one way we can continue to improve our services. Please take a few minutes to complete the written survey that you may receive in the mail after your visit with us. Thank you!             Your Updated Medication List - Protect others around you: Learn how to safely use, store and throw away your  medicines at www.disposemymeds.org.          This list is accurate as of 7/5/18 10:07 AM.  Always use your most recent med list.                   Brand Name Dispense Instructions for use Diagnosis    ADVIL PO      Take 200 mg by mouth        clonazePAM 0.5 MG tablet    klonoPIN    30 tablet    TAKE 1 TABLET BY MOUTH NIGHTLY AS NEEDED FOR ANXIETY    Anxiety       conjugated estrogens cream    PREMARIN     Place vaginally twice a week        magnesium 250 MG tablet     100 tablet    Take 1 tablet by mouth daily.        sertraline 100 MG tablet    ZOLOFT    90 tablet    TAKE 1 TABLET(100 MG) BY MOUTH DAILY    Major depression in complete remission (H)

## 2018-07-05 NOTE — PROGRESS NOTES
SUBJECTIVE:   Sharita Jospeh is a 52 year old female who presents to clinic today for the following health issues:    New Patient/Transfer of Care  Depression and Anxiety Follow-Up    Status since last visit: No change    Other associated symptoms:None    Complicating factors:     Significant life event: No     Current substance abuse: None    PHQ-9 2017   Total Score 14 4 3   Q9: Suicide Ideation Not at all Not at all Not at all     LUDIN-7 SCORE 2016   Total Score - - -   Total Score 18 10 9     PHQ-9  English  PHQ-9   Any Language  LUDIN-7  Suicide Assessment Five-step Evaluation and Treatment (SAFE-T)    Amount of exercise or physical activity: 4-5 days/week for an average of 30-45 minutes    Problems taking medications regularly: No    Medication side effects: none    Diet: regular (no restrictions)    Has been stable on her medications. No concerns. Rarely if ever uses the klonopin.     Looking at going abroad in [place]. He insurance only does 90 day supplies of medications. Wondering how she would stay there 1 year.       Problem list and histories reviewed & adjusted, as indicated.  Additional history: as documented    Patient Active Problem List   Diagnosis     Nonspecific abnormal results of liver function study     Allergic rhinitis due to other allergen     Premenstrual tension syndrome     Other chronic allergic conjunctivitis     Chronic rhinitis     Excessive or frequent menstruation     CARDIOVASCULAR SCREENING; LDL GOAL LESS THAN 160     Anxiety     Health Care Home     Major depression in complete remission (H)     DUB (dysfunctional uterine bleeding)     Vaginal burning     Dermal nevus     Epidermoid cyst of skin     Seborrheic keratosis     FH: breast cancer     Past Surgical History:   Procedure Laterality Date     C/SECTION, LOW TRANSVERSE      , Low Transverse     D & C       DILATION AND CURETTAGE, HYSTEROSCOPY DIAGNOSTIC, COMBINED   2014    Procedure: COMBINED DILATION AND CURETTAGE, HYSTEROSCOPY DIAGNOSTIC;  Surgeon: Cal Covington MD;  Location: [facility]     ENDOSCOPY       TONSILLECTOMY         Social History   Substance Use Topics     Smoking status: Former Smoker     Quit date: 2000     Smokeless tobacco: Never Used     Alcohol use Yes      Comment: rare     Family History   Problem Relation Age of Onset     Osteoperosis Mother      Breast Cancer Mother      Dx over 20 years ago, age 46     Arthritis Mother      Psychotic Disorder Mother      HEART DISEASE Mother      Lipids Father      Hypertension Father      Cerebrovascular Disease Father      HEART DISEASE Father      Dementia Father      Cancer Maternal Grandfather      lung, 25 years ago     Alzheimer Disease Paternal Grandmother      possible     Cancer - colorectal Paternal Grandfather      age of dx ?     Obesity Brother      Hypertension Brother      Melanoma No family hx of            Reviewed and updated as needed this visit by clinical staff  Tobacco  Allergies  Meds  Problems  Med Hx  Surg Hx  Fam Hx  Soc Hx        Reviewed and updated as needed this visit by Provider  Allergies  Meds  Problems         ROS:  Constitutional, HEENT, cardiovascular, pulmonary, gi and gu systems are negative, except as otherwise noted.    OBJECTIVE:     BP (!) 80/54 (BP Location: Right arm, Patient Position: Chair, Cuff Size: Adult Regular)  Pulse 64  Temp 97.6  F (36.4  C) (Oral)  Wt 137 lb (62.1 kg)  LMP 2014  SpO2 97%  Breastfeeding? No  BMI 20.38 kg/m2  Body mass index is 20.38 kg/(m^2).  GENERAL: healthy, alert and no distress  RESP: lungs clear to auscultation - no rales, rhonchi or wheezes  CV: regular rate and rhythm, normal S1 S2, no S3 or S4, no murmur, click or rub,     Diagnostic Test Results:  none     ASSESSMENT/PLAN:       ICD-10-CM    1. Major depression in complete remission (H) F32.5 sertraline (ZOLOFT) 100 MG tablet     *MA Screening  Digital Bilateral     ZOSTER VACCINE RECOMBINANT ADJUVANTED IM NJX   2. FH: breast cancer Z80.3    3. Encounter for screening mammogram for breast cancer Z12.31    Needs mammogram this year.   Refilled zoloft for 1 year. Patient may be able to do a vacation override so she gets 6 months of medications at a time. If not could consider writing for 2-100mg tablets daily. Patient will contact our office if needed.   Shingles vaccine today, repeat in 6 months.       FUTURE APPOINTMENTS:       - Follow-up for annual visit or as needed    Wendy Calderón PA-C  [facility]

## 2018-07-06 ASSESSMENT — PATIENT HEALTH QUESTIONNAIRE - PHQ9: SUM OF ALL RESPONSES TO PHQ QUESTIONS 1-9: 3

## 2018-07-20 ENCOUNTER — TELEPHONE (OUTPATIENT)
Dept: FAMILY MEDICINE | Facility: CLINIC | Age: 53
End: 2018-07-20

## 2018-07-20 DIAGNOSIS — F41.9 ANXIETY: ICD-10-CM

## 2018-07-20 RX ORDER — CLONAZEPAM 0.5 MG/1
TABLET ORAL
Qty: 30 TABLET | Refills: 0 | Status: SHIPPED | OUTPATIENT
Start: 2018-07-20 | End: 2018-09-28

## 2018-07-20 NOTE — TELEPHONE ENCOUNTER
Requested Prescriptions   Pending Prescriptions Disp Refills     clonazePAM (KLONOPIN) 0.5 MG tablet 30 tablet 0    There is no refill protocol information for this order        Last Written Prescription Date:  2/5/2018  Last Fill Quantity: 30,  # refills: 0   Last office visit: 7/5/2018 with prescribing provider:  Kaitlynn Hugo major depression in remission   Future Office Visit:    Routing refill request to provider for review/approval because:  Drug not on the [facility] refill protocol       Humera Gifford RN  [facility]

## 2018-07-20 NOTE — TELEPHONE ENCOUNTER
Reason for Call:  Medication or medication refill:    Do you use a [facility]?  Name of the pharmacy and phone number for the current request:  Attached below     Name of the medication requested: clonazePAM (KLONOPIN) 0.5 MG tablet    Other request: Patient is wanting to get the refill of this medication before the weekend. She is also wanting to speak with the team about why she needs the refill. Please call to discuss with Sharita.     Can we leave a detailed message on this number? YES    Phone number patient can be reached at: Home number on file [phone] (home)    Best Time: Anytime     Call taken on 7/20/2018 at 9:39 AM by Marcy Romero

## 2018-07-20 NOTE — TELEPHONE ENCOUNTER
Attempt # 1  Called patient at home [phone]   Was call answered?  Yes, patient states is going through a very stressful period right now, is moving out of state soon, is not sleeping well due to the stress, Klonopin helps her sleep. Is wondering if PCP would be willing to refill? Has 2 job offers out of state.   Will be moving at the latest a week from next Thursday. Only planning on taking one a day at  to help sleep if needed.   So will need enough to get her through the move and new position and time to find a provider.   Is exercising, meditation for sleep and that is not working, is very stressed regarding the move one job in [place] and the other in [place].   States did put on paper filled out at [facility]  7/5/2018 that was not sleeping well but at that time was hoping would be a passing thing.    Humera Gifford RN  [facility]

## 2018-07-20 NOTE — TELEPHONE ENCOUNTER
I will refill, but this should not be used on a nightly basis. Can be addictive if used regularly. Given to TC.  Wendy Calderón PA-C

## 2018-07-20 NOTE — TELEPHONE ENCOUNTER
See message below. Patient wanted to discuss why she needs the refill. Please clarify. I just saw patient for her physical and she indicated rare use.   Wendy Calderón PA-C

## 2018-07-26 ENCOUNTER — RADIANT APPOINTMENT (OUTPATIENT)
Dept: MAMMOGRAPHY | Facility: CLINIC | Age: 53
End: 2018-07-26
Attending: PHYSICIAN ASSISTANT
Payer: COMMERCIAL

## 2018-07-26 ENCOUNTER — OFFICE VISIT (OUTPATIENT)
Dept: OBGYN | Facility: CLINIC | Age: 53
End: 2018-07-26
Payer: COMMERCIAL

## 2018-07-26 VITALS
OXYGEN SATURATION: 99 % | HEIGHT: 69 IN | SYSTOLIC BLOOD PRESSURE: 100 MMHG | BODY MASS INDEX: 20.63 KG/M2 | WEIGHT: 139.3 LBS | DIASTOLIC BLOOD PRESSURE: 63 MMHG | HEART RATE: 60 BPM

## 2018-07-26 DIAGNOSIS — F32.5 MAJOR DEPRESSION IN COMPLETE REMISSION (H): ICD-10-CM

## 2018-07-26 DIAGNOSIS — Z79.890 HORMONE REPLACEMENT THERAPY (HRT): Primary | ICD-10-CM

## 2018-07-26 PROCEDURE — 99396 PREV VISIT EST AGE 40-64: CPT | Performed by: OBSTETRICS & GYNECOLOGY

## 2018-07-26 PROCEDURE — G0145 SCR C/V CYTO,THINLAYER,RESCR: HCPCS | Performed by: OBSTETRICS & GYNECOLOGY

## 2018-07-26 PROCEDURE — 77067 SCR MAMMO BI INCL CAD: CPT | Performed by: STUDENT IN AN ORGANIZED HEALTH CARE EDUCATION/TRAINING PROGRAM

## 2018-07-26 PROCEDURE — 87624 HPV HI-RISK TYP POOLED RSLT: CPT | Performed by: OBSTETRICS & GYNECOLOGY

## 2018-07-26 PROCEDURE — 77063 BREAST TOMOSYNTHESIS BI: CPT | Performed by: STUDENT IN AN ORGANIZED HEALTH CARE EDUCATION/TRAINING PROGRAM

## 2018-07-26 NOTE — PROGRESS NOTES
Sharita is a 52 year old female, , who is here for her annual exam.  She would like a refill of Premarin cream which she prefers to use twice a week.  She refuses to schedule a colonoscopy since she had a bad experience with her first procedure.  Her PGF was diagnosed with colon cancer.  She just had her mammogram earlier today with results pending.  She is moving to live in [place] for 11 months so declines hemoccult cards since she feels that she would just lose them.      ROS: Ten point review of systems was reviewed and negative except the above.    Health Maintenance   Topic Date Due     MAMMO Q1 YR  2018     DEPRESSION ACTION PLAN Q1 YR  2018     PHQ-9 Q6 MONTHS  2019     PAP Q5 YEARS  2020     HPV Q5 YEARS (Complete with PAP)  2020     COLON CANCER SCREEN (SYSTEM ASSIGNED)  2022     LIPID SCREEN Q5 YR FEMALE (SYSTEM ASSIGNED)  2022     TETANUS IMMUNIZATION (SYSTEM ASSIGNED)  2022     HIV SCREEN (SYSTEM ASSIGNED)  Addressed     HEPATITIS C SCREENING  Completed      Last pap: 11/9/15 normal pap  Last Mammogram: today with results pending  Last Dexa: not applicable  Last Colonoscopy: not due but patient refuses to schedule in the future, will do hemoccult cards though  Lab Results   Component Value Date    CHOL 210 2017     Lab Results   Component Value Date    HDL 95 2017     Lab Results   Component Value Date     2017     Lab Results   Component Value Date    TRIG 56 2017     Lab Results   Component Value Date    CHOLHDLRATIO 2.0 2011         OBHX:      PSH:   Past Surgical History:   Procedure Laterality Date     C/SECTION, LOW TRANSVERSE      , Low Transverse     D & C       DILATION AND CURETTAGE, HYSTEROSCOPY DIAGNOSTIC, COMBINED  2014    Procedure: COMBINED DILATION AND CURETTAGE, HYSTEROSCOPY DIAGNOSTIC;  Surgeon: Cal Covington MD;  Location: [facility] OR     ENDOSCOPY       TONSILLECTOMY    "        PMH: Her past medical, surgical, and obstetric histories were reviewed and are documented in their appropriate chart areas.    ALL/Meds: Her medication and allergy histories were reviewed and are documented in their appropriate chart areas.    SH/FMH: Her social and family history was reviewed and documented in its appropriate chart area.    PE: /63 (BP Location: Right arm, Cuff Size: Adult Regular)  Pulse 60  Ht 5' 8.65\" (1.744 m)  Wt 139 lb 4.8 oz (63.2 kg)  LMP 08/27/2014  SpO2 99%  Breastfeeding? No  BMI 20.78 kg/m2  Body mass index is 20.78 kg/(m^2).    General Appearance:  healthy, alert, active, no distress  Cardiovascular:  Regular rate and Rhythm without murmur  Neck: Supple, no adenopathy and thyroid normal  Lungs:  Clear, without wheeze, rale or rhonchi  Breast: normal breast exam  Abdomen: Benign, Soft, flat, non-tender, No masses, organomegaly, No inguinal nodes and Bowel sounds normoactive.   Pelvic:       - Ext: Vulva and perineum are normal without lesion, mass or discharge        - Urethra: normal without discharge       - Urethral Meatus: normal appearance       - Bladder: no tenderness, no masses       - Vagina: Normal mucosa, no discharge        - Cervix: normal and nulliparous       - Uterus:Normal shape, position and consistency        - Adnexa: Normal without masses or tenderness       - Rectal: deferred    A/P:  Well Woman Exam, HRT Refill     -  I discussed the new pap recommendations regarding screening.  Explained the rationale for increased intervals between paps.  Questions asked and answered.  She does agree to this regiment.  Pap was obtained and submitted   -  BC: postmenopausal   -  Refill of Premarin cream with instructions reviewed   -  Mammogram done today with results pending   -  She declines a colonoscopy but will do hemoccult cards when due  Orders Placed This Encounter   Procedures     Pap imaged thin layer screen with HPV - recommended age 30 - 65 years " (select HPV order below)      - Encouraged self-breast exam   - Encouraged low fat diet, regular exercise, and adequate calcium intake.    Irene Stallworth DO  FACOG, FACS

## 2018-07-26 NOTE — MR AVS SNAPSHOT
"              After Visit Summary   7/26/2018    Sharita Joseph    MRN: [id number]           Patient Information     Date Of Birth          1965        Visit Information        Provider Department      7/26/2018 1:30 PM Irene Stallworth DO [facility]        Today's Diagnoses     Hormone replacement therapy (HRT)    -  1       Follow-ups after your visit        Follow-up notes from your care team     Return in about 1 year (around 7/26/2019).      Who to contact     If you have questions or need follow up information about today's clinic visit or your schedule please contact [facility] directly at [phone].  Normal or non-critical lab and imaging results will be communicated to you by MyChart, letter or phone within 4 business days after the clinic has received the results. If you do not hear from us within 7 days, please contact the clinic through FreshPayhart or phone. If you have a critical or abnormal lab result, we will notify you by phone as soon as possible.  Submit refill requests through On The Net Yet or call your pharmacy and they will forward the refill request to us. Please allow 3 business days for your refill to be completed.          Additional Information About Your Visit        MyChart Information     On The Net Yet gives you secure access to your electronic health record. If you see a primary care provider, you can also send messages to your care team and make appointments. If you have questions, please call your primary care clinic.  If you do not have a primary care provider, please call [phone] and they will assist you.        Care EveryWhere ID     This is your Care EveryWhere ID. This could be used by other organizations to access your [facility] medical records  [id number]        Your Vitals Were     Pulse Height Last Period Pulse Oximetry Breastfeeding? BMI (Body Mass Index)    60 5' 8.65\" (1.744 m) 08/27/2014 99% No 20.78 kg/m2       Blood Pressure " from Last 3 Encounters:   07/26/18 100/63   07/05/18 (!) 80/54   03/21/18 93/61    Weight from Last 3 Encounters:   07/26/18 139 lb 4.8 oz (63.2 kg)   07/05/18 137 lb (62.1 kg)   03/21/18 137 lb (62.1 kg)              We Performed the Following     Pap imaged thin layer screen with HPV - recommended age 30 - 65 years (select HPV order below)          Today's Medication Changes          These changes are accurate as of 7/26/18  2:03 PM.  If you have any questions, ask your nurse or doctor.               These medicines have changed or have updated prescriptions.        Dose/Directions    * conjugated estrogens cream   Commonly known as:  PREMARIN   This may have changed:  Another medication with the same name was added. Make sure you understand how and when to take each.   Changed by:  Irene Stallworth DO        Place vaginally twice a week   Refills:  0       * conjugated estrogens cream   Commonly known as:  PREMARIN   This may have changed:  You were already taking a medication with the same name, and this prescription was added. Make sure you understand how and when to take each.   Used for:  Hormone replacement therapy (HRT)   Changed by:  Irene Stallworth DO        Dose:  0.5 g   Place 0.5 g vaginally twice a week   Quantity:  30 g   Refills:  3       * Notice:  This list has 2 medication(s) that are the same as other medications prescribed for you. Read the directions carefully, and ask your doctor or other care provider to review them with you.         Where to get your medicines      These medications were sent to [id number] - [place], [place] - [address] AT [address]  [address], [place] [address]     Phone:  [phone]     conjugated estrogens cream                Primary Care Provider Office Phone # Fax #    Wendy Calderón PA-C [phone]       [address] [address]        Equal Access  to Services     VAHE OLIVER : Liz Chiu, walayne munguia, qapratimabeth bojorquezalmikey benitez. So [facility] [phone].    ATENCIÓN: Si edd cordova, tiene a jeff disposición servicios gratuitos de asistencia lingüística. Llame al [phone].    We comply with applicable federal civil rights laws and [place] laws. We do not discriminate on the basis of race, color, national origin, age, disability, sex, sexual orientation, or gender identity.            Thank you!     Thank you for choosing [facility]  for your care. Our goal is always to provide you with excellent care. Hearing back from our patients is one way we can continue to improve our services. Please take a few minutes to complete the written survey that you may receive in the mail after your visit with us. Thank you!             Your Updated Medication List - Protect others around you: Learn how to safely use, store and throw away your medicines at www.disposemymeds.org.          This list is accurate as of 7/26/18  2:03 PM.  Always use your most recent med list.                   Brand Name Dispense Instructions for use Diagnosis    ADVIL PO      Take 200 mg by mouth        clonazePAM 0.5 MG tablet    klonoPIN    30 tablet    TAKE 1 TABLET BY MOUTH NIGHTLY AS NEEDED FOR ANXIETY    Anxiety       * conjugated estrogens cream    PREMARIN     Place vaginally twice a week        * conjugated estrogens cream    PREMARIN    30 g    Place 0.5 g vaginally twice a week    Hormone replacement therapy (HRT)       magnesium 250 MG tablet     100 tablet    Take 1 tablet by mouth daily.        sertraline 100 MG tablet    ZOLOFT    90 tablet    TAKE 1 TABLET(100 MG) BY MOUTH DAILY    Major depression in complete remission (H)       * Notice:  This list has 2 medication(s) that are the same as other medications prescribed for you. Read the directions carefully, and ask your doctor or other care  provider to review them with you.

## 2018-07-27 ASSESSMENT — PATIENT HEALTH QUESTIONNAIRE - PHQ9: SUM OF ALL RESPONSES TO PHQ QUESTIONS 1-9: 4

## 2018-07-30 LAB
COPATH REPORT: NORMAL
PAP: NORMAL

## 2018-07-31 LAB
FINAL DIAGNOSIS: NORMAL
HPV HR 12 DNA CVX QL NAA+PROBE: NEGATIVE
HPV16 DNA SPEC QL NAA+PROBE: NEGATIVE
HPV18 DNA SPEC QL NAA+PROBE: NEGATIVE
SPECIMEN DESCRIPTION: NORMAL
SPECIMEN SOURCE CVX/VAG CYTO: NORMAL

## 2018-09-28 ENCOUNTER — OFFICE VISIT (OUTPATIENT)
Dept: FAMILY MEDICINE | Facility: CLINIC | Age: 53
End: 2018-09-28
Payer: COMMERCIAL

## 2018-09-28 VITALS
TEMPERATURE: 98.1 F | BODY MASS INDEX: 19.84 KG/M2 | WEIGHT: 133 LBS | HEART RATE: 80 BPM | DIASTOLIC BLOOD PRESSURE: 68 MMHG | SYSTOLIC BLOOD PRESSURE: 104 MMHG

## 2018-09-28 DIAGNOSIS — F41.9 ANXIETY: ICD-10-CM

## 2018-09-28 DIAGNOSIS — F32.5 MAJOR DEPRESSION IN COMPLETE REMISSION (H): Primary | ICD-10-CM

## 2018-09-28 PROCEDURE — 99214 OFFICE O/P EST MOD 30 MIN: CPT | Performed by: PHYSICIAN ASSISTANT

## 2018-09-28 RX ORDER — CLONAZEPAM 0.5 MG/1
TABLET ORAL
Qty: 30 TABLET | Refills: 1 | Status: SHIPPED | OUTPATIENT
Start: 2018-09-28 | End: 2019-01-14
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
## 2018-09-28 NOTE — MR AVS SNAPSHOT
After Visit Summary   9/28/2018    Sharita Joseph    MRN: [id number]           Patient Information     Date Of Birth          1965        Visit Information        Provider Department      9/28/2018 8:20 AM Wendy Calderón PA-C [facility]        Today's Diagnoses     Major depression in complete remission (H)    -  1    Anxiety           Follow-ups after your visit        Who to contact     If you have questions or need follow up information about today's clinic visit or your schedule please contact [facility] directly at [phone].  Normal or non-critical lab and imaging results will be communicated to you by SmartCare systemhart, letter or phone within 4 business days after the clinic has received the results. If you do not hear from us within 7 days, please contact the clinic through Tattoodot or phone. If you have a critical or abnormal lab result, we will notify you by phone as soon as possible.  Submit refill requests through WebTuner or call your pharmacy and they will forward the refill request to us. Please allow 3 business days for your refill to be completed.          Additional Information About Your Visit        MyChart Information     WebTuner gives you secure access to your electronic health record. If you see a primary care provider, you can also send messages to your care team and make appointments. If you have questions, please call your primary care clinic.  If you do not have a primary care provider, please call [phone] and they will assist you.        Care EveryWhere ID     This is your Care EveryWhere ID. This could be used by other organizations to access your [facility] medical records  [id number]        Your Vitals Were     Pulse Temperature Last Period Breastfeeding? BMI (Body Mass Index)       80 98.1  F (36.7  C) (Oral) 08/27/2014 No 19.84 kg/m2        Blood Pressure from Last 3 Encounters:   09/28/18 104/68   07/26/18 100/63    07/05/18 (!) 80/54    Weight from Last 3 Encounters:   09/28/18 133 lb (60.3 kg)   07/26/18 139 lb 4.8 oz (63.2 kg)   07/05/18 137 lb (62.1 kg)              Today, you had the following     No orders found for display         Where to get your medicines      Some of these will need a paper prescription and others can be bought over the counter.  Ask your nurse if you have questions.     Bring a paper prescription for each of these medications     clonazePAM 0.5 MG tablet          Primary Care Provider Office Phone # Fax #    Wendy Calderón PA-C [phone]       [address] [address]        Equal Access to Services     VAHE OLIVER : Hadii rosalba solimano Aram, waaxda luqadaha, qaybta kaalmada gabbyyakelly, mikey hatch . So [facility] [phone].    ATENCIÓN: Si habla español, tiene a jeff disposición servicios gratuitos de asistencia lingüística. Llame al [phone].    We comply with applicable federal civil rights laws and [place] laws. We do not discriminate on the basis of race, color, national origin, age, disability, sex, sexual orientation, or gender identity.            Thank you!     Thank you for choosing [facility]  for your care. Our goal is always to provide you with excellent care. Hearing back from our patients is one way we can continue to improve our services. Please take a few minutes to complete the written survey that you may receive in the mail after your visit with us. Thank you!             Your Updated Medication List - Protect others around you: Learn how to safely use, store and throw away your medicines at www.disposemymeds.org.          This list is accurate as of 9/28/18  8:43 AM.  Always use your most recent med list.                   Brand Name Dispense Instructions for use Diagnosis    ADVIL PO      Take 200 mg by mouth        clonazePAM 0.5 MG tablet    klonoPIN    30 tablet    TAKE 1 TABLET  BY MOUTH NIGHTLY AS NEEDED FOR ANXIETY    Anxiety       * conjugated estrogens cream    PREMARIN     Place vaginally twice a week        * conjugated estrogens cream    PREMARIN    30 g    Place 0.5 g vaginally twice a week    Hormone replacement therapy (HRT)       magnesium 250 MG tablet     100 tablet    Take 1 tablet by mouth daily.        sertraline 100 MG tablet    ZOLOFT    90 tablet    TAKE 1 TABLET(100 MG) BY MOUTH DAILY    Major depression in complete remission (H)       * Notice:  This list has 2 medication(s) that are the same as other medications prescribed for you. Read the directions carefully, and ask your doctor or other care provider to review them with you.

## 2019-01-08 DIAGNOSIS — Z79.890 HORMONE REPLACEMENT THERAPY (HRT): ICD-10-CM

## 2019-01-08 NOTE — TELEPHONE ENCOUNTER
Left message on pt's phone to return call to clinic.  Pt received a 30g tube with 3 refills, sig: place 0.5 gram vaginally twice a week.  I would like to know if pt is out of this medication and has contacted her pharmacy for refills.    Grace Davis RN

## 2019-01-08 NOTE — TELEPHONE ENCOUNTER
Phone call to patient and explained that she should have RF's available at the pharmacy and she should call and speak to pharmacist. Explained the pharmacist can petition insurance to get an early fill. Patient agreed that it is an insurance issue not the clinic's and will call pharmacy. Debbie Oliva RN, BAN

## 2019-01-08 NOTE — TELEPHONE ENCOUNTER
Patient returned call. She had moved and in that transition she had lost a tube of the medication. She wont be back home for a month and his hoping to get this filled sooner than later    Keyana Samayoa  Women's Health

## 2019-01-08 NOTE — TELEPHONE ENCOUNTER
Pending Prescriptions:                       Disp   Refills    conjugated estrogens (PREMARIN) 0.625 MG/*30 g   3            Sig: Place 0.5 g vaginally twice a week

## 2019-01-11 DIAGNOSIS — F41.9 ANXIETY: ICD-10-CM

## 2019-01-11 NOTE — TELEPHONE ENCOUNTER
Requested Prescriptions   Pending Prescriptions Disp Refills     clonazePAM (KLONOPIN) 0.5 MG tablet 30 tablet 1     Sig: TAKE 1 TABLET BY MOUTH NIGHTLY AS NEEDED FOR ANXIETY    There is no refill protocol information for this order         Last Written Prescription Date:  9-28-18  Last Fill Quantity: 30,   # refills: 1  Last Office Visit: 9-28-18  Future Office visit:       Routing refill request to provider for review/approval because:  Drug not on the [facility] or [facility] refill protocol or controlled substance

## 2019-01-14 RX ORDER — CLONAZEPAM 0.5 MG/1
TABLET ORAL
Qty: 30 TABLET | Refills: 0 | Status: SHIPPED | OUTPATIENT
Start: 2019-01-14 | End: 2021-03-18

## 2019-09-16 DIAGNOSIS — F32.5 MAJOR DEPRESSION IN COMPLETE REMISSION (H): ICD-10-CM

## 2019-09-16 RX ORDER — SERTRALINE HYDROCHLORIDE 100 MG/1
TABLET, FILM COATED ORAL
Qty: 90 TABLET | Refills: 3 | Status: CANCELLED | OUTPATIENT
Start: 2019-09-16

## 2019-09-16 NOTE — TELEPHONE ENCOUNTER
"Requested Prescriptions   Pending Prescriptions Disp Refills     sertraline (ZOLOFT) 100 MG tablet 90 tablet 3     Sig: TAKE 1 TABLET(100 MG) BY MOUTH DAILY   Last Written Prescription Date:  7/5/18  Last Fill Quantity: 90,  # refills: 3   Last office visit: 9/28/2018 with prescribing provider:     Future Office Visit:        SSRIs Protocol Failed - 9/16/2019  2:21 PM        Failed - PHQ-9 score less than 5 in past 6 months     Please review last PHQ-9 score.   PHQ-9 SCORE 7/12/2017 7/5/2018 7/26/2018   PHQ-9 Total Score - - -   PHQ-9 Total Score 4 3 4           Failed - Recent (6 mo) or future (30 days) visit within the authorizing provider's specialty     Patient had office visit in the last 6 months or has a visit in the next 30 days with authorizing provider or within the authorizing provider's specialty.  See \"Patient Info\" tab in inbasket, or \"Choose Columns\" in Meds & Orders section of the refill encounter.            Passed - Medication is active on med list        Passed - Patient is age 18 or older        Passed - No active pregnancy on record        Passed - No positive pregnancy test in last 12 months          "

## 2019-09-17 ENCOUNTER — TELEPHONE (OUTPATIENT)
Dept: FAMILY MEDICINE | Facility: CLINIC | Age: 54
End: 2019-09-17
Payer: COMMERCIAL

## 2019-09-17 NOTE — TELEPHONE ENCOUNTER
Reason for Call:  Medication or medication refill:    Do you use a [facility]?  Name of the pharmacy and phone number for the current request:  [facility]    Name of the medication requested: sertraline (ZOLOFT)     Other request: Patient called and stated she has been living in [place] and is returning to [place] on Saturday 9/21.      Can we leave a detailed message on this number? YES    Phone number patient can be reached at: Cell number on file:    Telephone Information:   Mobile [phone]       Best Time: Anytime    Call taken on 9/17/2019 at 4:04 PM by Vianney Bhatia

## 2019-09-18 NOTE — TELEPHONE ENCOUNTER
PHQ-9 SCORE 7/5/2018 7/26/2018 9/18/2019   PHQ-9 Total Score - - -   PHQ-9 Total Score MyChart - - 4 (Minimal depression)   PHQ-9 Total Score 3 4 4

## 2019-10-24 NOTE — TELEPHONE ENCOUNTER
Panel Management Review      Patient has the following on her problem list:     Depression / Dysthymia review  PHQ-9 SCORE 11/18/2016 1/30/2017 7/12/2017   Total Score - - -   Total Score 9 14 4      Patient is due for:  None        Composite cancer screening  Chart review shows that this patient is due/due soon for the following None  Summary:    Patient is due/failing the following:   DAP, FOLLOW UP and LDL    Action needed:   Patient needs office visit for depression and LDL screening.    Type of outreach:    Phone, left message for patient to call back.  and Sent letter.    Questions for provider review:    None                                                                                                                                    [facility]        Chart routed to Care Team .            used

## 2019-11-06 ENCOUNTER — HEALTH MAINTENANCE LETTER (OUTPATIENT)
Age: 54
End: 2019-11-06

## 2019-12-17 ENCOUNTER — OFFICE VISIT (OUTPATIENT)
Dept: FAMILY MEDICINE | Facility: CLINIC | Age: 54
End: 2019-12-17
Payer: COMMERCIAL

## 2019-12-17 VITALS
HEART RATE: 63 BPM | SYSTOLIC BLOOD PRESSURE: 103 MMHG | WEIGHT: 136.8 LBS | DIASTOLIC BLOOD PRESSURE: 70 MMHG | TEMPERATURE: 98.5 F | HEIGHT: 69 IN | OXYGEN SATURATION: 97 % | BODY MASS INDEX: 20.26 KG/M2

## 2019-12-17 DIAGNOSIS — Z00.00 ROUTINE GENERAL MEDICAL EXAMINATION AT A HEALTH CARE FACILITY: Primary | ICD-10-CM

## 2019-12-17 DIAGNOSIS — R79.89 ELEVATED LFTS: ICD-10-CM

## 2019-12-17 DIAGNOSIS — Z12.31 ENCOUNTER FOR SCREENING MAMMOGRAM FOR BREAST CANCER: ICD-10-CM

## 2019-12-17 DIAGNOSIS — F32.5 MAJOR DEPRESSION IN COMPLETE REMISSION (H): ICD-10-CM

## 2019-12-17 DIAGNOSIS — L30.9 DERMATITIS: ICD-10-CM

## 2019-12-17 LAB
ALBUMIN SERPL-MCNC: 4 G/DL (ref 3.4–5)
ALP SERPL-CCNC: 80 U/L (ref 40–150)
ALT SERPL W P-5'-P-CCNC: 35 U/L (ref 0–50)
ANION GAP SERPL CALCULATED.3IONS-SCNC: 3 MMOL/L (ref 3–14)
AST SERPL W P-5'-P-CCNC: 33 U/L (ref 0–45)
BILIRUB SERPL-MCNC: 0.3 MG/DL (ref 0.2–1.3)
BUN SERPL-MCNC: 16 MG/DL (ref 7–30)
CALCIUM SERPL-MCNC: 9.8 MG/DL (ref 8.5–10.1)
CHLORIDE SERPL-SCNC: 106 MMOL/L (ref 94–109)
CO2 SERPL-SCNC: 30 MMOL/L (ref 20–32)
CREAT SERPL-MCNC: 0.92 MG/DL (ref 0.52–1.04)
GFR SERPL CREATININE-BSD FRML MDRD: 71 ML/MIN/{1.73_M2}
GLUCOSE SERPL-MCNC: 88 MG/DL (ref 70–99)
POTASSIUM SERPL-SCNC: 4.6 MMOL/L (ref 3.4–5.3)
PROT SERPL-MCNC: 7.4 G/DL (ref 6.8–8.8)
SODIUM SERPL-SCNC: 139 MMOL/L (ref 133–144)

## 2019-12-17 PROCEDURE — 80053 COMPREHEN METABOLIC PANEL: CPT | Performed by: PHYSICIAN ASSISTANT

## 2019-12-17 PROCEDURE — 36415 COLL VENOUS BLD VENIPUNCTURE: CPT | Performed by: PHYSICIAN ASSISTANT

## 2019-12-17 PROCEDURE — 90750 HZV VACC RECOMBINANT IM: CPT | Performed by: PHYSICIAN ASSISTANT

## 2019-12-17 PROCEDURE — 99396 PREV VISIT EST AGE 40-64: CPT | Mod: 25 | Performed by: PHYSICIAN ASSISTANT

## 2019-12-17 PROCEDURE — 90471 IMMUNIZATION ADMIN: CPT | Performed by: PHYSICIAN ASSISTANT

## 2019-12-17 PROCEDURE — 99213 OFFICE O/P EST LOW 20 MIN: CPT | Mod: 25 | Performed by: PHYSICIAN ASSISTANT

## 2019-12-17 RX ORDER — DOXYCYCLINE 100 MG/1
100 CAPSULE ORAL 2 TIMES DAILY
Qty: 42 CAPSULE | Refills: 0 | Status: SHIPPED | OUTPATIENT
Start: 2019-12-17 | End: 2020-04-10

## 2019-12-17 RX ORDER — SERTRALINE HYDROCHLORIDE 100 MG/1
TABLET, FILM COATED ORAL
Qty: 90 TABLET | Refills: 3 | Status: SHIPPED | OUTPATIENT
Start: 2019-12-17 | End: 2021-01-04

## 2019-12-17 ASSESSMENT — ENCOUNTER SYMPTOMS
SORE THROAT: 0
ABDOMINAL PAIN: 0
DIZZINESS: 0
MYALGIAS: 0
DYSURIA: 0
EYE PAIN: 0
PALPITATIONS: 0
NAUSEA: 0
CONSTIPATION: 0
BREAST MASS: 0
DIARRHEA: 0
CHILLS: 0
HEMATOCHEZIA: 0
SHORTNESS OF BREATH: 0
JOINT SWELLING: 0
NERVOUS/ANXIOUS: 0
HEMATURIA: 0
ARTHRALGIAS: 0
FEVER: 0
COUGH: 0
FREQUENCY: 0
HEARTBURN: 0
HEADACHES: 0
PARESTHESIAS: 0
WEAKNESS: 0

## 2019-12-17 ASSESSMENT — MIFFLIN-ST. JEOR: SCORE: 1280.15

## 2019-12-17 NOTE — NURSING NOTE
Prior to immunization administration, verified patients identity using patient s name and date of birth. Please see Immunization Activity for additional information.     Screening Questionnaire for Adult Immunization    Are you sick today?   No   Do you have allergies to medications, food, a vaccine component or latex?   No   Have you ever had a serious reaction after receiving a vaccination?   No   Do you have a long-term health problem with heart, lung, kidney, or metabolic disease (e.g., diabetes), asthma, a blood disorder, no spleen, complement component deficiency, a cochlear implant, or a spinal fluid leak?  Are you on long-term aspirin therapy?   No   Do you have cancer, leukemia, HIV/AIDS, or any other immune system problem?   No   Do you have a parent, brother, or sister with an immune system problem?   No   In the past 3 months, have you taken medications that affect  your immune system, such as prednisone, other steroids, or anticancer drugs; drugs for the treatment of rheumatoid arthritis, Crohn s disease, or psoriasis; or have you had radiation treatments?   No   Have you had a seizure, or a brain or other nervous system problem?   No   During the past year, have you received a transfusion of blood or blood    products, or been given immune (gamma) globulin or antiviral drug?   No   For women: Are you pregnant or is there a chance you could become       pregnant during the next month?   No   Have you received any vaccinations in the past 4 weeks?   Yes     Immunization questionnaire was positive for at least one answer.  Notified Wendy Calderón.      Patient instructed to remain in clinic for 15 minutes afterwards, and to report any adverse reaction to me immediately.  Vaccine information supplied.  Verified patient's name and date of birth prior to injection.     Screening performed by Svetlana Reveles MA on 12/17/2019 at 1:35 PM.

## 2019-12-17 NOTE — PATIENT INSTRUCTIONS
1. Schedule your mammogram.   2. You can try 1/2-1 tab of Unisom.       Patient Education     Preventive Health Recommendations  Female Ages 50 - 64    Yearly exam: See your health care provider every year in order to  o Review health changes.   o Discuss preventive care.    o Review your medicines if your doctor has prescribed any.      Get a Pap test every three years (unless you have an abnormal result and your provider advises testing more often).    If you get Pap tests with HPV test, you only need to test every 5 years, unless you have an abnormal result.     You do not need a Pap test if your uterus was removed (hysterectomy) and you have not had cancer.    You should be tested each year for STDs (sexually transmitted diseases) if you're at risk.     Have a mammogram every 1 to 2 years.    Have a colonoscopy at age 50, or have a yearly FIT test (stool test). These exams screen for colon cancer.      Have a cholesterol test every 5 years, or more often if advised.    Have a diabetes test (fasting glucose) every three years. If you are at risk for diabetes, you should have this test more often.     If you are at risk for osteoporosis (brittle bone disease), think about having a bone density scan (DEXA).    Shots: Get a flu shot each year. Get a tetanus shot every 10 years.    Nutrition:     Eat at least 5 servings of fruits and vegetables each day.    Eat whole-grain bread, whole-wheat pasta and brown rice instead of white grains and rice.    Get adequate Calcium and Vitamin D.     Lifestyle    Exercise at least 150 minutes a week (30 minutes a day, 5 days a week). This will help you control your weight and prevent disease.    Limit alcohol to one drink per day.    No smoking.     Wear sunscreen to prevent skin cancer.     See your dentist every six months for an exam and cleaning.    See your eye doctor every 1 to 2 years.
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
## 2019-12-20 ENCOUNTER — ANCILLARY PROCEDURE (OUTPATIENT)
Dept: MAMMOGRAPHY | Facility: CLINIC | Age: 54
End: 2019-12-20
Attending: PHYSICIAN ASSISTANT
Payer: COMMERCIAL

## 2019-12-20 DIAGNOSIS — Z12.31 ENCOUNTER FOR SCREENING MAMMOGRAM FOR BREAST CANCER: ICD-10-CM

## 2019-12-20 PROCEDURE — 77067 SCR MAMMO BI INCL CAD: CPT | Mod: TC

## 2019-12-20 PROCEDURE — 77063 BREAST TOMOSYNTHESIS BI: CPT | Mod: TC

## 2020-04-06 ENCOUNTER — TELEPHONE (OUTPATIENT)
Dept: FAMILY MEDICINE | Facility: CLINIC | Age: 55
End: 2020-04-06

## 2020-04-06 ENCOUNTER — E-VISIT (OUTPATIENT)
Dept: FAMILY MEDICINE | Facility: CLINIC | Age: 55
End: 2020-04-06
Payer: COMMERCIAL

## 2020-04-06 DIAGNOSIS — S99.929A INJURY OF TOE, UNSPECIFIED LATERALITY, INITIAL ENCOUNTER: Primary | ICD-10-CM

## 2020-04-06 NOTE — TELEPHONE ENCOUNTER
Reason for call:  Patient reporting a symptom    Symptom or request: Swollen toes    Duration (how long have symptoms been present): Saturday     Have you been treated for this before? No    Additional comments: Patient is wanting to speak to nurse regarding swollen and purple toes.Please call patient to discuss.    Phone Number patient can be reached at:  Home number on file [phone] (home) or Cell number on file:    Telephone Information:   Mobile [phone]       Best Time:  Antime    Can we leave a detailed message on this number:  YES    Call taken on 4/6/2020 at 8:00 AM by Sarita Kumar

## 2020-04-06 NOTE — TELEPHONE ENCOUNTER
Attempt # 1  Called patient at home number.  Mobile [phone]       Was call answered?  Yes, little toe and next to it is purple and swollen, stubbed toe really badly on Saturday, limping, hurts to walk, is concerned is dislocated?    Patient not wanting to come to clinic due to the Covid/19.    Nurse advised E-visit with attached picture of toes/ankle.         Humera Gifford RN  [facility]

## 2020-04-09 ENCOUNTER — E-VISIT (OUTPATIENT)
Dept: FAMILY MEDICINE | Facility: CLINIC | Age: 55
End: 2020-04-09
Payer: COMMERCIAL

## 2020-04-09 DIAGNOSIS — S99.929D INJURY OF TOE, UNSPECIFIED LATERALITY, SUBSEQUENT ENCOUNTER: Primary | ICD-10-CM

## 2020-04-09 PROCEDURE — 99207 ZZC NON-BILLABLE SERV PER CHARTING: CPT | Performed by: PHYSICIAN ASSISTANT

## 2020-04-09 NOTE — TELEPHONE ENCOUNTER
Provider E-Visit time total (minutes): no charge-pt needs office visit.  Please see if she can be seen this week-or maybe if able she should see ortho-ok to wait until early next week to see them if they are able.      Geraldine Gallego PA-C

## 2020-04-10 ENCOUNTER — OFFICE VISIT (OUTPATIENT)
Dept: FAMILY MEDICINE | Facility: CLINIC | Age: 55
End: 2020-04-10
Payer: COMMERCIAL

## 2020-04-10 ENCOUNTER — ANCILLARY PROCEDURE (OUTPATIENT)
Dept: GENERAL RADIOLOGY | Facility: CLINIC | Age: 55
End: 2020-04-10
Attending: NURSE PRACTITIONER
Payer: COMMERCIAL

## 2020-04-10 VITALS
DIASTOLIC BLOOD PRESSURE: 70 MMHG | SYSTOLIC BLOOD PRESSURE: 107 MMHG | HEART RATE: 63 BPM | BODY MASS INDEX: 21.45 KG/M2 | WEIGHT: 144 LBS

## 2020-04-10 DIAGNOSIS — S99.922A INJURY OF TOE ON LEFT FOOT, INITIAL ENCOUNTER: ICD-10-CM

## 2020-04-10 DIAGNOSIS — S99.922A INJURY OF TOE ON LEFT FOOT, INITIAL ENCOUNTER: Primary | ICD-10-CM

## 2020-04-10 DIAGNOSIS — S92.515A CLOSED NONDISPLACED FRACTURE OF PROXIMAL PHALANX OF LESSER TOE OF LEFT FOOT, INITIAL ENCOUNTER: ICD-10-CM

## 2020-04-10 PROCEDURE — 99213 OFFICE O/P EST LOW 20 MIN: CPT | Performed by: NURSE PRACTITIONER

## 2020-04-10 PROCEDURE — 73660 X-RAY EXAM OF TOE(S): CPT | Mod: LT

## 2020-04-10 NOTE — PROGRESS NOTES
Subjective     Sharita Joseph is a 54 year old female who presents to clinic today for the following health issues:    HPI   Joint Pain    Onset: Saturday 04/04/2020     Description:   Location: left foot toe  Character: swelling on 5th toe, bruising, hurts to walk, tender to the touch    Intensity:  6/10    Progression of Symptoms: little better    Accompanying Signs & Symptoms:  Other symptoms: none    History:   Previous similar pain: YES  dislocated a toe once but cant remember which one.    Precipitating factors:   Trauma or overuse: YES Stubbed her foot onto a chair     Alleviating factors:  Improved by:     Therapies Tried and outcome: ibuprofen, ice, elevating  it, tried taping it but pressure made it worse, tylenol not helpful. She has been hesitant to take ibuprofen due to concerns that it may worsen covid-19 if she were to get that     She states that bruising is improving to her left foot. Her pain is the worst in the mornings.       Current Outpatient Medications   Medication Sig Dispense Refill     clonazePAM (KLONOPIN) 0.5 MG tablet TAKE 1 TABLET BY MOUTH NIGHTLY AS NEEDED FOR ANXIETY 30 tablet 0     conjugated estrogens (PREMARIN) cream Place 0.5 g vaginally twice a week 30 g 3     diphenhydrAMINE HCl (BENADRYL PO)        Ibuprofen (ADVIL PO) Take 200 mg by mouth       Magnesium 250 MG tablet Take 1 tablet by mouth daily. 100 tablet 3     sertraline (ZOLOFT) 100 MG tablet TAKE 1 TABLET(100 MG) BY MOUTH DAILY 90 tablet 3     UNABLE TO FIND MEDICATION NAME:vitamin b       Cholecalciferol (VITAMIN D3 PO) Take by mouth daily       Allergies   Allergen Reactions     Adhesive Tape      Dust Mites      dust     Gluten Meal      Gluten intolerance     Trees        Reviewed and updated as needed this visit by Provider         Review of Systems   ROS COMP: Constitutional, HEENT, cardiovascular, pulmonary, gi and gu systems are negative, except toe pain      Objective    /70   Pulse 63   Wt 65.3 kg  (144 lb)   LMP 08/27/2014   BMI 21.45 kg/m    Body mass index is 21.45 kg/m .  Physical Exam   GENERAL: healthy, alert and no distress  RESP: respirations even and unlabored   CV: peripheral pulses palpable to LLE   MS: decreased range of motion noted to 4th and 5th digits on left foot, swelling also noted to these two digits   SKIN: green/purple bruising noted to 2nd, 3rd, 4th and 5th digits on left foot. All 5 digits are warm to touch on left foot, there is no break in the skin to this foot.    NEURO: mentation intact and speech normal  PSYCH: mentation appears normal, affect normal/bright        Assessment & Plan     1. Injury of toe on left foot, initial encounter  Reviewed preliminary x-ray results pt. Continue to encourage buddy tapping as able. Encouraged tylenol or ibuprofen as needed for pain. Encouraged elevation and ice application as needed for pain. Pt to wear hard soled shoes as tolerates to promote healing. Recommend repeating toe x-ray in 1 month to ensure healing of toe.   - XR Toe Left G/E 2 Views; Future    2. Closed nondisplaced fracture of proximal phalanx of lesser toe of left foot, initial encounter  See above   - XR Toe Left G/E 2 Views; Future       Return in about 4 weeks (around 5/8/2020) for repeat toe x-ray .    Radha Manrique NP  [facility]    ADDENDUM: voicemail left for pt at 1452 informing her that I sent her a Send[employer] message and to call with any questions.

## 2020-04-10 NOTE — PATIENT INSTRUCTIONS
Patient Education     Closed Toe Fracture  Your toe is broken (fractured). This causes local pain, swelling, and sometimes bruising. This injury usually takes about 4 to 6 weeks to heal, but can sometimes take longer. Toe injuries are often treated by taping the injured toe to the next one (buddy taping). Or a hard shoe, splint or cast may be used. This protects the injured toe and holds it in position.  If the toenail has been severely injured, it may fall off in 1 to 2 weeks. It takes up to 12 months for a new toenail to grow back.  Home care  Follow these guidelines when caring for yourself at home:    You may be given a cast shoe to wear to keep your toe from moving. If not, you can use a sandal or any shoe that doesn t put pressure on the injured toe until the swelling and pain go away. If using a sandal, be careful not to strike your foot against anything. Another injury could make the fracture worse. If you were given crutches, don t put full weight on the injured foot until you can do so without pain, or as directed by your healthcare provider.    Keep your foot elevated to reduce pain and swelling. When sleeping, put a pillow under the injured leg. When sitting, support the injured leg so it is above your heart. This is very important during the first 2 days (48 hours).    Put an ice pack on the injured area. Do this for 20 minutes every 1 to 2 hours the first day for pain relief. You can make an ice pack by wrapping a plastic bag of ice cubes in a thin towel. As the ice melts, be careful that any cloth or paper tape doesn t get wet. Continue using the ice pack 3 to 4 times a day for the next 2 days. Then use the ice pack as needed to ease pain and swelling.    If buddy tape was used and it becomes wet or dirty, change it. You may replace it with paper, plastic, or cloth tape. Cloth tape and paper tapes must be kept dry.    You may use acetaminophen or ibuprofen to control pain, unless another pain medicine  was prescribed. If you have chronic liver or kidney disease, talk with your healthcare provider before using these medicines. Also talk with your provider if you ve had a stomach ulcer or gastrointestinal bleeding.    You may return to sports or physical education activities after 4 weeks when you can run without pain, or as directed by your healthcare provider.      When to seek medical advice  Call your healthcare provider right away if any of these occur:    Pain or swelling gets worse    The cast/splint cracks    The cast and padding get wet and stays wet more than 24 hours    Bad odor from the cast/splint or wound fluid stains the cast    Tightness or pressure under the cast/splint gets worse    Toe becomes cold, blue, numb, or tingly    You can t move the toe    Signs of infection: fever, redness, warmth, swelling, or drainage from the wound or cast    Fever of 100.4 F (38 C) or higher, or chills as directed by your healthcare provider  Date Last Reviewed: 2/1/2017 2000-2019 [employer]. [address] [place] [address], [place] [id number]. All rights reserved. This information is not intended as a substitute for professional medical care. Always follow your healthcare professional's instructions.

## 2020-04-14 ENCOUNTER — TELEPHONE (OUTPATIENT)
Dept: FAMILY MEDICINE | Facility: CLINIC | Age: 55
End: 2020-04-14

## 2020-04-14 DIAGNOSIS — S92.919A CLOSED NONDISPLACED FRACTURE OF PHALANX OF TOE, UNSPECIFIED LATERALITY, UNSPECIFIED TOE, INITIAL ENCOUNTER: Primary | ICD-10-CM

## 2020-04-14 NOTE — TELEPHONE ENCOUNTER
"Routing to providers to advise in Radha Manrique's absence please.  Patient injured toe on 4/4/20, saw Radha on 4/10/20 and x-ray confirmed fracture.  Patient recommended to buddy tape, return in 4 weeks for repeat x-ray.  Patient reports she has not been able to tape much due to swelling,  continues to have swelling and pain.  Reports good circulation and denies numbness/tingling.  She states Radha had also mentioned a shoe/boot, so she is wondering about getting one. Can we order this for her, and could she pick one up from  clinic?    Carmita Dick RN    \"1. Injury of toe on left foot, initial encounter  Reviewed preliminary x-ray results pt. Continue to encourage buddy tapping as able. Encouraged tylenol or ibuprofen as needed for pain. Encouraged elevation and ice application as needed for pain. Pt to wear hard soled shoes as tolerates to promote healing. Recommend repeating toe x-ray in 1 month to ensure healing of toe.   - XR Toe Left G/E 2 Views; Future     2. Closed nondisplaced fracture of proximal phalanx of lesser toe of left foot, initial encounter  See above   - XR Toe Left G/E 2 Views; Future        Return in about 4 weeks (around 5/8/2020) for repeat toe x-ray .     Radha Manrique NP  [facility]\"  "

## 2020-04-14 NOTE — TELEPHONE ENCOUNTER
DME forms and post-op shoe placed at the  for patient to . Patient notified.  Janis Saba CMA ([facility])

## 2020-04-14 NOTE — TELEPHONE ENCOUNTER
Reason for Call:  Other call back and prescription    Detailed comments:  Patient saw you last Friday 4-10-20 and has broken toe.  You talked about her getting a shoe she would like one please give her a call back about getting one.    Phone Number Patient can be reached at: Home number on file [phone] (home)    Best Time:  Anytime     Can we leave a detailed message on this number? YES    Call taken on 4/14/2020 at 7:41 AM by Victorina Ziegler

## 2020-04-14 NOTE — TELEPHONE ENCOUNTER
I did put order in epic.  Order has to be printed and signed, will forward to our partners who are  in [facility] and have her pick it up there  Oralia Larose D.O.

## 2020-12-12 ENCOUNTER — NURSE TRIAGE (OUTPATIENT)
Dept: NURSING | Facility: CLINIC | Age: 55
End: 2020-12-12

## 2020-12-12 NOTE — TELEPHONE ENCOUNTER
Sharita reports that she broke her pinky toe, right foot.   Has redness, mildly swollen.    Hurts only when she steps on it, does not hurt when at rest.    She was ordered a shoe back in April for fracture of her pinky toe, left foot. Asks if this is something she can  at the clinic. FNA advised that it appears that this shoe was purchased from a third party supplier.    She can purchase from [employer] but may receive it on wednesday, she prefers not to to wait until 12/16.    Per protocol, advised to be seen within 24 hours. Care advice reviewed. Patient declined as she would just like to purchase the shoe and treat at home, states that her  is about to get a kidney transplant and is quarantined until her 's surgery. Advised to call back with further questions/concerns.     Message routed to PCP, patient would like to know where she can purchase the shoe prescribed back in April.    Gretchen Curry RN/[facility] Nurse Advisor          Additional Information    Negative: [1] Major bleeding (e.g., spurting blood) AND [2] can't be stopped    Negative: Amputated toe    Negative: Skin is split open or gaping (or length > 1/2 inch or 12 mm)    Negative: [1] Bleeding AND [2] won't stop after 10 minutes of direct pressure (using correct technique)    Negative: [1] Dirt in the wound AND [2] not removed with 15 minutes of scrubbing    Negative: Sounds like a serious injury to the triager    Negative: [1] SEVERE pain AND [2] not improved 2 hours after pain medicine/ice packs    Negative: [1] MODERATE-SEVERE pain AND [2] blood present under the toenail    Negative: Looks like a broken bone (e.g., crooked or deformed)    Negative: Looks like a dislocated joint (e.g., crooked or deformed)    Negative: Toenail is completely torn off (toenail avulsion)    Negative: Base of toenail has popped out of the skin fold (nail base dislocation)    [1] Toe injury AND [2] bad limp or can't wear shoes/sandals    Protocols used:  TOE INJURY-A-AH

## 2020-12-14 NOTE — TELEPHONE ENCOUNTER
Attempted to call patient. Left a message to return our call to the RN triage line.    Bre Wilburn RN

## 2020-12-14 NOTE — TELEPHONE ENCOUNTER
Patient should have at minimum a telephone visit to order the shoe so that it might be covered by insurance. However as this is her possible second fracture within a year she should consider an in office visit to confirm fracture and talk about fracture risk for the future like osteoporosis.   Wendy Calderón PA-C

## 2020-12-18 NOTE — TELEPHONE ENCOUNTER
Attempt # 2  Called [phone] (home).      Did patient answer the phone: No, left a message on voicemail to return call to the [facility] at [phone].    MERLE RolandN,RN  [facility]

## 2020-12-21 NOTE — TELEPHONE ENCOUNTER
Called patient, she stated her father passed away and forgot to call us back but her toe is better and the shoe I snot needed. Nurse advised to call the clinic for any other concerns.     Bre Wilburn RN

## 2020-12-31 DIAGNOSIS — F32.5 MAJOR DEPRESSION IN COMPLETE REMISSION (H): ICD-10-CM

## 2021-01-04 RX ORDER — SERTRALINE HYDROCHLORIDE 100 MG/1
TABLET, FILM COATED ORAL
Qty: 90 TABLET | Refills: 0 | Status: SHIPPED | OUTPATIENT
Start: 2021-01-04 | End: 2021-04-08

## 2021-02-14 ENCOUNTER — HEALTH MAINTENANCE LETTER (OUTPATIENT)
Age: 56
End: 2021-02-14

## 2021-03-10 ENCOUNTER — TELEPHONE (OUTPATIENT)
Dept: ALLERGY | Facility: CLINIC | Age: 56
End: 2021-03-10

## 2021-03-10 NOTE — TELEPHONE ENCOUNTER
Patient states she would like to know if testing will be done at her first appointment with you.  Please call.    Thank you.

## 2021-03-10 NOTE — TELEPHONE ENCOUNTER
Called and left message for patient to return call to RN's direct line [phone].    If patient would like allergy testing she will need to be off of antihistamines for 7 days prior to appointment.     MERLE ReidN, RN

## 2021-03-10 NOTE — CONFIDENTIAL NOTE
Spoke with patient and informed her that allergy testing can be done at the first visit as long as she has stopped all antihistamines for 7 days prior the scheduled appointment.     Janis Horne, MERLEN, RN

## 2021-03-18 ENCOUNTER — OFFICE VISIT (OUTPATIENT)
Dept: ALLERGY | Facility: CLINIC | Age: 56
End: 2021-03-18
Payer: COMMERCIAL

## 2021-03-18 VITALS
OXYGEN SATURATION: 98 % | DIASTOLIC BLOOD PRESSURE: 66 MMHG | SYSTOLIC BLOOD PRESSURE: 96 MMHG | BODY MASS INDEX: 20.41 KG/M2 | WEIGHT: 137 LBS | HEART RATE: 75 BPM

## 2021-03-18 DIAGNOSIS — J31.0 NONALLERGIC RHINITIS: Primary | ICD-10-CM

## 2021-03-18 DIAGNOSIS — J34.829 NASAL VALVE COLLAPSE: ICD-10-CM

## 2021-03-18 PROCEDURE — 99204 OFFICE O/P NEW MOD 45 MIN: CPT | Mod: 25 | Performed by: ALLERGY & IMMUNOLOGY

## 2021-03-18 PROCEDURE — 95004 PERQ TESTS W/ALRGNC XTRCS: CPT | Performed by: ALLERGY & IMMUNOLOGY

## 2021-03-18 RX ORDER — IPRATROPIUM BROMIDE 42 UG/1
2 SPRAY, METERED NASAL 4 TIMES DAILY PRN
Qty: 15 ML | Refills: 3 | Status: SHIPPED | OUTPATIENT
Start: 2021-03-18 | End: 2021-07-01

## 2021-03-18 RX ORDER — AZELASTINE 1 MG/ML
1-2 SPRAY, METERED NASAL 2 TIMES DAILY
Qty: 30 ML | Refills: 3 | Status: SHIPPED | OUTPATIENT
Start: 2021-03-18 | End: 2021-07-01

## 2021-03-18 NOTE — LETTER
"    3/18/2021         RE: Sharita Joseph  [address]  [facility] [address]        Dear Colleague,    Thank you for referring your patient, Sharita Joseph, to the [facility]. Please see a copy of my visit note below.    Sharita Joseph was seen in the Allergy Clinic at [facility].    Sharita Joseph is a 55 year old White female being seen today in consultation for allergies. She states that she has struggled with allergies since she was 10 years of age. She had a sinus infection \"that wouldn't go away\" and she was tested for allergies at that time. She was prescribed diphenhydramine to take daily at that time. Sharita reports that in the early 2000s she had symptoms of persistent rhinorrhea and sneezing that were particularly bothersome in the mornings. She also struggled with itchy eyes. She underwent allergy testing in 2001 and recalls she tested positive to molds and dust mites. She was on allergy shots for approximately 3.5 years. She then got  and her  had a pet cat which seemed to cause her symptoms. She was re-tested for allergies and found to be allergic to cats and trees but did not pursue immunotherapy at that time. They have since re-homed the cat. Sharita and her  moved to [place] and lived there for several years. She reports that she had a lot of difficulty breathing through her nose at night and was taking large doses of antihistamines. Her  now lives in [place] and she moved to [place] in July for a job. She travels back and forth occasionally but spends most of her time in [place]. Her  has a dog however she does not have any pets. Her home does not have any carpeting and has only hardwood floors or tile. Prior to the COVID-19 pandemic Sharita would travel regularly, including internationally and spends a lot of time outdoors and hiking. She recalls that when she traveled in [place] she didn't seem to have any symptoms " "but she traveled there several years ago.    Sharita's symptoms include dry and irritated eyes, difficulty breathing through her nose, particularly at night, and choking at night due to post-nasal drainage. She does snore and sleeps with her mouth open. She often feels tired during the day but has never had a sleep study. She has previously used both fluticasone and triamcinolone nasal sprays. She started developing cysts behind her ears which she attributed to the nasal steroids and stopped the fluticasone. She does use the triamcinolone intermittently. Sharita also uses ketotifen eye drops or lubricating eye drops for her ocular symptoms and finds this is helpful. She also wears Breathe Right strips on her nose nightly and these help her to breathe more easily through her nose. She dislikes taking diphenhydramine because it \"dries her out\" but she has found it helpful in the past.      Past Medical History:   Diagnosis Date     Allergic rhinitis due to other allergen 2001    Allergies year round, testing positive for mold, dust and boxelder trees.  Did 3years of shots     Depressive disorder, not elsewhere classified     Was Zoloft which stopped working, side effects to celexa, prozac sexual side effects.     Family History   Problem Relation Age of Onset     Osteoporosis Mother      Breast Cancer Mother         Dx over 20 years ago, age 46     Arthritis Mother      Psychotic Disorder Mother      Heart Disease Mother      Lipids Father      Hypertension Father      Cerebrovascular Disease Father      Heart Disease Father      Dementia Father      Cancer Maternal Grandfather         lung, 25 years ago     Alzheimer Disease Paternal Grandmother         possible     Cancer - colorectal Paternal Grandfather         age of dx ?     Obesity Brother      Hypertension Brother      Melanoma No family hx of      Past Surgical History:   Procedure Laterality Date     C/SECTION, LOW TRANSVERSE      , Low Transverse "     D & C       DILATION AND CURETTAGE, HYSTEROSCOPY DIAGNOSTIC, COMBINED  5/21/2014    Procedure: COMBINED DILATION AND CURETTAGE, HYSTEROSCOPY DIAGNOSTIC;  Surgeon: Cal Covington MD;  Location: [facility] OR     ENDOSCOPY       TONSILLECTOMY         ENVIRONMENTAL HISTORY: The [place] The home is heated with a forced air. They do have central air conditioning. The patient's bedroom is furnished with Indoor plants, hard maría in bedroom and allergen mattress cover.  Pets inside the house include 1 dog(s). There is not history of cockroach or mice infestation. Do you smoke cigarettes or other recreational drugs? No Do you vape or use an e-cigarette? No There is/are 0 smokers living in the house.  There is/are 0 who smoke ecigarettes/vape living in the house.The house does not have a damp basement.     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 She lives with her self during school year,  and adult daughter when school is not in session.  Her  works in [employer].    REVIEW OF SYSTEMS:  General: negative for weight gain. negative for weight loss. negative for changes in sleep.   Eyes: positive  for itching. negative for redness. positive  for tearing/watering. negative for vision changes  Ears: negative for fullness. negative for hearing loss. negative for dizziness.   Nose: positive  for snoring.negative for changes in smell. positive  for drainage.   Throat: negative for hoarseness. negative for sore throat. negative for trouble swallowing.   Lungs: negative for cough. negative for shortness of breath.negative for wheezing. positive  for sputum production.   Cardiovascular: negative for chest pain. negative for swelling of ankles. negative for fast or irregular heartbeat.   Gastrointestinal: negative for nausea. negative for heartburn. negative for acid reflux.   Musculoskeletal: negative for joint pain. negative for joint stiffness. negative for  joint swelling.   Neurologic: negative for seizures. negative for fainting. negative for weakness.   Psychiatric: negative for changes in mood. positive  for anxiety.   Endocrine: negative for cold intolerance. negative for heat intolerance. negative for tremors.   Hematologic: positive  for easy bruising. negative for easy bleeding.  Integumentary: negative for rash. negative for scaling. negative for nail changes.       Current Outpatient Medications:      Cholecalciferol (VITAMIN D3 PO), Take by mouth daily, Disp: , Rfl:      conjugated estrogens (PREMARIN) cream, Place 0.5 g vaginally twice a week, Disp: 30 g, Rfl: 3     diphenhydrAMINE HCl (BENADRYL PO), , Disp: , Rfl:      Magnesium 250 MG tablet, Take 1 tablet by mouth daily., Disp: 100 tablet, Rfl: 3     sertraline (ZOLOFT) 100 MG tablet, +++NEED ANNUAL EXAM+++TAKE 1 TABLET(100 MG) BY MOUTH DAILY, Disp: 90 tablet, Rfl: 0     UNABLE TO FIND, MEDICATION NAME:vitamin b, Disp: , Rfl:   Immunization History   Administered Date(s) Administered     HEPA 11/26/2008, 12/28/2009     TD (ADULT, 7+) 04/01/2003     TDAP Vaccine (Adacel) 09/18/2012     Zoster vaccine recombinant adjuvanted (SHINGRIX) 07/05/2018, 12/17/2019     Allergies   Allergen Reactions     Adhesive Tape      Gluten Meal      Gluten intolerance         EXAM:   BP 96/66 (Cuff Size: Adult Regular)   Pulse 75   Wt 62.1 kg (137 lb)   LMP 08/27/2014   SpO2 98%   BMI 20.41 kg/m    GENERAL APPEARANCE: alert, cooperative and not in distress  SKIN: no rashes, no lesions  HEAD: atraumatic, normocephalic  EYES: lids and lashes normal, conjunctivae and sclerae clear, pupils equal, round, reactive to light, EOM full and intact  ENT: no scars or lesions, nasal exam showed narrow nasal passages with further narrowing with deep inhalation through the nose, manually holding the naris open and providing support relieved her symptoms, otoscopy showed external auditory canals clear, tympanic membranes normal, tongue  midline and normal, soft palate, uvula, and tonsils normal  NECK: no asymmetry, masses, or scars, supple without significant adenopathy  LUNGS: unlabored respirations, no intercostal retractions or accessory muscle use, clear to auscultation without rales or wheezes  HEART: regular rate and rhythm without murmurs and normal S1 and S2  MUSCULOSKELETAL: no musculoskeletal defects are noted  NEURO: no focal deficits noted  PSYCH: does not appear depressed or anxious    WORKUP: Skin testing    ENVIRONMENTAL PERCUTANEOUS SKIN TESTING: ADULT  [place] [facility] 3/18/2021   Consent Y   Ordering Physician Dr. Deleon   Interpreting Physician Dr. Deleon   Testing Technician EVELIA Jaimes    Location Back   Time start: 11:49 AM   Time End: 12:04 PM   Positive Control: Histatrol*ALK 1 mg/ml 6/18   Negative Control: 50% Glycerin 0   Cat Hair*ALK (10,000 BAU/ml) 0   AP Dog Hair/Dander (1:100 w/v) 0   Dust Mite p. 30,000 AU/ml 0   Dust Mite f. (30,000 AU/ml) 0   Jake (W/F in millimeters) 0   Antoine Grass (100,000 BAU/mL) 0   Red Cedar (W/F in millimeters) 0   Maple/[place] (W/F in millimeters) 0   Hackberry (W/F in millimeters) 0   [place] (W/F in millimeters) 0   [place] *ALK (W/F in millimeters) 0   American Elm (W/F in millimeters) 0   [place] (W/F in millimeters) 0   Black [place] (W/F in millimeters) 0   Birch Mix (W/F in millimeters) 0   [place] (W/F in millimeters) 0   Oak (W/F in millimeters) 0   [place] (W/F in millimeters) 0   [place] (W/F in millimeters) 0   [place] (W/F in millimeters) 0   Careless (W/F in millimeters) 0   Nettle (W/F in millimeters) 0   English Plantain (W/F in millimeters) 0   Kochia (W/F in millimeters) 0   Lamb's Quarter (W/F in millimeters) 0   Marshelder (W/F in millimeters) 0   Ragweed Mix* ALK (W/F in millimeters) 0   Russian Thistle (W/F in millimeters) 0   Sagebrush/Mugwort (W/F in millimeters) 0   Sheep Sorrel (W/F in millimeters) 0   Feather Mix* ALK (W/F in millimeters) 0    Penicillium Mix (1:10 w/v) 0   Curvularia spicifera (1:10 w/v) 0   Epicoccum (1:10 w/v) 0   Aspergillus fumigatus (1:10 w/v): 0   Alternaria tenius (1:10 w/v) 0   H. Cladosporium (1:10 w/v) 0   Phoma herbarum (1:10 w/v) 0      Appropriate response to controls, all others negative    ASSESSMENT/PLAN:  Sharita Joseph is a 55 year old female here for evaluation of possible allergies.    1. Nonallergic rhinitis - Sharita reports a long-standing history of rhinoconjunctivitis symptoms. Her main symptoms are difficulty breathing through her nose, particularly at night, post-nasal drainage, and eye irritation. There is no seasonal pattern to these symptoms. She has had previous allergy testing which she reports was positive for sensitization to dust mite, mold, tree pollen, and cats however the previous records are not available for reviewed.  Her ocular symptoms have responded to lubrication as well as antihistamine eye drops. Sharita has also found nasal steroids to be helpful but she is concerned about the potential long-term side effects. Skin testing performed today was negative for evidence of aeroallergen sensitization. We reviewed medication management, including the potential side effects of nasal steroids, and discussed addition of other nasal sprays to help with her post-nasal drainage. Additionally we discussed that there may be a structural component to her symptoms particularly as she has improvement with use of breathe right strips as well as manually supporting the external nares.    - may continue using triamcinolone nasal spray, 1-2 sprays in each nostril daily  - azelastine (ASTELIN) 0.1 % nasal spray; Spray 1-2 sprays into both nostrils 2 times daily  Dispense: 30 mL; Refill: 3  - ipratropium (ATROVENT) 0.06 % nasal spray; Spray 2 sprays into both nostrils 4 times daily as needed for rhinitis  Dispense: 15 mL; Refill: 3  - ALLERGY SKIN TESTS,ALLERGENS  - OTOLARYNGOLOGY REFERRAL    2. Nasal valve collapse  - see above    - OTOLARYNGOLOGY REFERRAL      Follow-up in 3 months, sooner if needed      Thank you for allowing me to participate in the care of Sharita Joseph.      Hafsa Deleon MD, [facility]  Allergy/Immunology  [facility]      Chart documentation done in part with Dragon Voice Recognition Software. Although reviewed after completion, some word and grammatical errors may remain.      Per provider verbal order, placed Adult Environmental Panel scratch test.  Consent was obtained prior to procedure.  Once panels were placed, patient was monitored for 15 minutes in clinic.  Provider read test after 15 minutes.  Pt tolerated procedure well.  All questions and concerns were addressed at office visit.     Fiona BESS RN                  Again, thank you for allowing me to participate in the care of your patient.        Sincerely,        Hafsa Deleon MD

## 2021-03-18 NOTE — PATIENT INSTRUCTIONS
If you have any questions regarding your allergies, asthma, or what we discussed during your visit today please call the allergy clinic or contact us via [employer].    [facility] Allergy RN Line: [phone]  [facility] Scheduling Line: [phone]  [facility] Scheduling Line: [phone]    Clinic Schedule:   [place] - Monday, Tuesday, and Thursday  [facility] - Wednesday      Your tests were all negative today    ENVIRONMENTAL PERCUTANEOUS SKIN TESTING: ADULT  [place] [facility] 3/18/2021   Consent Y   Ordering Physician Dr. Deleon   Interpreting Physician Dr. Deleon   Testing Technician EVELIA Jaimes    Location Back   Time start: 11:49 AM   Time End: 12:04 PM   Positive Control: Histatrol*ALK 1 mg/ml 6/18   Negative Control: 50% Glycerin 0   Cat Hair*ALK (10,000 BAU/ml) 0   AP Dog Hair/Dander (1:100 w/v) 0   Dust Mite p. 30,000 AU/ml 0   Dust Mite f. (30,000 AU/ml) 0   Jake (W/F in millimeters) 0   Antoine Grass (100,000 BAU/mL) 0   Red Cedar (W/F in millimeters) 0   Maple/[place] (W/F in millimeters) 0   Hackberry (W/F in millimeters) 0   [place] (W/F in millimeters) 0   [place] *ALK (W/F in millimeters) 0   American Elm (W/F in millimeters) 0   [place] (W/F in millimeters) 0   Black [place] (W/F in millimeters) 0   Birch Mix (W/F in millimeters) 0   [place] (W/F in millimeters) 0   Oak (W/F in millimeters) 0   Cocklebur (W/F in millimeters) 0   [place] (W/F in millimeters) 0   White [place]anto (W/F in millimeters) 0   Careless (W/F in millimeters) 0   Nettle (W/F in millimeters) 0   English Plantain (W/F in millimeters) 0   Kochia (W/F in millimeters) 0   Lamb's Quarter (W/F in millimeters) 0   Marshelder (W/F in millimeters) 0   Ragweed Mix* ALK (W/F in millimeters) 0   Russian Thistle (W/F in millimeters) 0   Sagebrush/Mugwort (W/F in millimeters) 0   Sheep Sorrel (W/F in millimeters) 0   Feather Mix* ALK (W/F in millimeters)  0   Penicillium Mix (1:10 w/v) 0   Curvularia spicifera (1:10 w/v) 0   Epicoccum (1:10 w/v) 0   Aspergillus fumigatus (1:10 w/v): 0   Alternaria tenius (1:10 w/v) 0   H. Cladosporium (1:10 w/v) 0   Phoma herbarum (1:10 w/v) 0

## 2021-03-18 NOTE — PROGRESS NOTES
Per provider verbal order, placed Adult Environmental Panel scratch test.  Consent was obtained prior to procedure.  Once panels were placed, patient was monitored for 15 minutes in clinic.  Provider read test after 15 minutes.  Pt tolerated procedure well.  All questions and concerns were addressed at office visit.     Fiona BESS RN

## 2021-03-18 NOTE — PROGRESS NOTES
"Sharita Joseph was seen in the Allergy Clinic at [facility].    Sharita Joseph is a 55 year old White female being seen today in consultation for allergies. She states that she has struggled with allergies since she was 10 years of age. She had a sinus infection \"that wouldn't go away\" and she was tested for allergies at that time. She was prescribed diphenhydramine to take daily at that time. Sharita reports that in the early 2000s she had symptoms of persistent rhinorrhea and sneezing that were particularly bothersome in the mornings. She also struggled with itchy eyes. She underwent allergy testing in 2001 and recalls she tested positive to molds and dust mites. She was on allergy shots for approximately 3.5 years. She then got  and her  had a pet cat which seemed to cause her symptoms. She was re-tested for allergies and found to be allergic to cats and trees but did not pursue immunotherapy at that time. They have since re-homed the cat. Sharita and her  moved to [place] and lived there for several years. She reports that she had a lot of difficulty breathing through her nose at night and was taking large doses of antihistamines. Her  now lives in [place] and she moved to [place] in July for a job. She travels back and forth occasionally but spends most of her time in [place]. Her  has a dog however she does not have any pets. Her home does not have any carpeting and has only hardwood floors or tile. Prior to the COVID-19 pandemic Sharita would travel regularly, including internationally and spends a lot of time outdoors and hiking. She recalls that when she traveled in [place] she didn't seem to have any symptoms but she traveled there several years ago.    Sharita's symptoms include dry and irritated eyes, difficulty breathing through her nose, particularly at night, and choking at night due to post-nasal drainage. She does snore and sleeps with her mouth " "open. She often feels tired during the day but has never had a sleep study. She has previously used both fluticasone and triamcinolone nasal sprays. She started developing cysts behind her ears which she attributed to the nasal steroids and stopped the fluticasone. She does use the triamcinolone intermittently. Sharita also uses ketotifen eye drops or lubricating eye drops for her ocular symptoms and finds this is helpful. She also wears Breathe Right strips on her nose nightly and these help her to breathe more easily through her nose. She dislikes taking diphenhydramine because it \"dries her out\" but she has found it helpful in the past.      Past Medical History:   Diagnosis Date     Allergic rhinitis due to other allergen 2001    Allergies year round, testing positive for mold, dust and boxelder trees.  Did 3years of shots     Depressive disorder, not elsewhere classified     Was Zoloft which stopped working, side effects to celexa, prozac sexual side effects.     Family History   Problem Relation Age of Onset     Osteoporosis Mother      Breast Cancer Mother         Dx over 20 years ago, age 46     Arthritis Mother      Psychotic Disorder Mother      Heart Disease Mother      Lipids Father      Hypertension Father      Cerebrovascular Disease Father      Heart Disease Father      Dementia Father      Cancer Maternal Grandfather         lung, 25 years ago     Alzheimer Disease Paternal Grandmother         possible     Cancer - colorectal Paternal Grandfather         age of dx ?     Obesity Brother      Hypertension Brother      Melanoma No family hx of      Past Surgical History:   Procedure Laterality Date     C/SECTION, LOW TRANSVERSE      , Low Transverse     D & C       DILATION AND CURETTAGE, HYSTEROSCOPY DIAGNOSTIC, COMBINED  2014    Procedure: COMBINED DILATION AND CURETTAGE, HYSTEROSCOPY DIAGNOSTIC;  Surgeon: Cal Covington MD;  Location: [facility] OR     ENDOSCOPY       TONSILLECTOMY   "       ENVIRONMENTAL HISTORY: The [place] The home is heated with a forced air. They do have central air conditioning. The patient's bedroom is furnished with Indoor plants, hard maría in bedroom and allergen mattress cover.  Pets inside the house include 1 dog(s). There is not history of cockroach or mice infestation. Do you smoke cigarettes or other recreational drugs? No Do you vape or use an e-cigarette? No There is/are 0 smokers living in the house.  There is/are 0 who smoke ecigarettes/vape living in the house.The house does not have a damp basement.     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 She lives with her self during school year,  and adult daughter when school is not in session.  Her  works in [employer].    REVIEW OF SYSTEMS:  General: negative for weight gain. negative for weight loss. negative for changes in sleep.   Eyes: positive  for itching. negative for redness. positive  for tearing/watering. negative for vision changes  Ears: negative for fullness. negative for hearing loss. negative for dizziness.   Nose: positive  for snoring.negative for changes in smell. positive  for drainage.   Throat: negative for hoarseness. negative for sore throat. negative for trouble swallowing.   Lungs: negative for cough. negative for shortness of breath.negative for wheezing. positive  for sputum production.   Cardiovascular: negative for chest pain. negative for swelling of ankles. negative for fast or irregular heartbeat.   Gastrointestinal: negative for nausea. negative for heartburn. negative for acid reflux.   Musculoskeletal: negative for joint pain. negative for joint stiffness. negative for joint swelling.   Neurologic: negative for seizures. negative for fainting. negative for weakness.   Psychiatric: negative for changes in mood. positive  for anxiety.   Endocrine: negative for cold intolerance. negative for heat intolerance.  negative for tremors.   Hematologic: positive  for easy bruising. negative for easy bleeding.  Integumentary: negative for rash. negative for scaling. negative for nail changes.       Current Outpatient Medications:      Cholecalciferol (VITAMIN D3 PO), Take by mouth daily, Disp: , Rfl:      conjugated estrogens (PREMARIN) cream, Place 0.5 g vaginally twice a week, Disp: 30 g, Rfl: 3     diphenhydrAMINE HCl (BENADRYL PO), , Disp: , Rfl:      Magnesium 250 MG tablet, Take 1 tablet by mouth daily., Disp: 100 tablet, Rfl: 3     sertraline (ZOLOFT) 100 MG tablet, +++NEED ANNUAL EXAM+++TAKE 1 TABLET(100 MG) BY MOUTH DAILY, Disp: 90 tablet, Rfl: 0     UNABLE TO FIND, MEDICATION NAME:vitamin b, Disp: , Rfl:   Immunization History   Administered Date(s) Administered     HEPA 11/26/2008, 12/28/2009     TD (ADULT, 7+) 04/01/2003     TDAP Vaccine (Adacel) 09/18/2012     Zoster vaccine recombinant adjuvanted (SHINGRIX) 07/05/2018, 12/17/2019     Allergies   Allergen Reactions     Adhesive Tape      Gluten Meal      Gluten intolerance         EXAM:   BP 96/66 (Cuff Size: Adult Regular)   Pulse 75   Wt 62.1 kg (137 lb)   LMP 08/27/2014   SpO2 98%   BMI 20.41 kg/m    GENERAL APPEARANCE: alert, cooperative and not in distress  SKIN: no rashes, no lesions  HEAD: atraumatic, normocephalic  EYES: lids and lashes normal, conjunctivae and sclerae clear, pupils equal, round, reactive to light, EOM full and intact  ENT: no scars or lesions, nasal exam showed narrow nasal passages with further narrowing with deep inhalation through the nose, manually holding the naris open and providing support relieved her symptoms, otoscopy showed external auditory canals clear, tympanic membranes normal, tongue midline and normal, soft palate, uvula, and tonsils normal  NECK: no asymmetry, masses, or scars, supple without significant adenopathy  LUNGS: unlabored respirations, no intercostal retractions or accessory muscle use, clear to auscultation  without rales or wheezes  HEART: regular rate and rhythm without murmurs and normal S1 and S2  MUSCULOSKELETAL: no musculoskeletal defects are noted  NEURO: no focal deficits noted  PSYCH: does not appear depressed or anxious    WORKUP: Skin testing    ENVIRONMENTAL PERCUTANEOUS SKIN TESTING: ADULT  [facility] 3/18/2021   Consent Y   Ordering Physician Dr. Deleon   Interpreting Physician Dr. Deleon   Testing Technician EVELIA Jaimes    Location Back   Time start: 11:49 AM   Time End: 12:04 PM   Positive Control: Histatrol*ALK 1 mg/ml 6/18   Negative Control: 50% Glycerin 0   Cat Hair*ALK (10,000 BAU/ml) 0   AP Dog Hair/Dander (1:100 w/v) 0   Dust Mite p. 30,000 AU/ml 0   Dust Mite f. (30,000 AU/ml) 0   Jake (W/F in millimeters) 0   Antoine Grass (100,000 BAU/mL) 0   Red Cedar (W/F in millimeters) 0   Maple/[place] (W/F in millimeters) 0   Hackberry (W/F in millimeters) 0   Klamath (W/F in millimeters) 0   [place] *ALK (W/F in millimeters) 0   American Elm (W/F in millimeters) 0   [place] (W/F in millimeters) 0   Black [place] (W/F in millimeters) 0   Birch Mix (W/F in millimeters) 0   [place] (W/F in millimeters) 0   Oak (W/F in millimeters) 0   Cocklebur (W/F in millimeters) 0   Reno (W/F in millimeters) 0   White Santo (W/F in millimeters) 0   Careless (W/F in millimeters) 0   Nettle (W/F in millimeters) 0   English Plantain (W/F in millimeters) 0   Kochia (W/F in millimeters) 0   Lamb's Quarter (W/F in millimeters) 0   Marshelder (W/F in millimeters) 0   Ragweed Mix* ALK (W/F in millimeters) 0   Russian Thistle (W/F in millimeters) 0   Sagebrush/Mugwort (W/F in millimeters) 0   Sheep Sorrel (W/F in millimeters) 0   Feather Mix* ALK (W/F in millimeters) 0   Penicillium Mix (1:10 w/v) 0   Curvularia spicifera (1:10 w/v) 0   Epicoccum (1:10 w/v) 0   Aspergillus fumigatus (1:10 w/v): 0   Alternaria tenius (1:10 w/v) 0   H. Cladosporium (1:10 w/v) 0   Phoma herbarum (1:10 w/v) 0      Appropriate  response to controls, all others negative    ASSESSMENT/PLAN:  Sharita Joseph is a 55 year old female here for evaluation of possible allergies.    1. Nonallergic rhinitis - Sharita reports a long-standing history of rhinoconjunctivitis symptoms. Her main symptoms are difficulty breathing through her nose, particularly at night, post-nasal drainage, and eye irritation. There is no seasonal pattern to these symptoms. She has had previous allergy testing which she reports was positive for sensitization to dust mite, mold, tree pollen, and cats however the previous records are not available for reviewed.  Her ocular symptoms have responded to lubrication as well as antihistamine eye drops. Sharita has also found nasal steroids to be helpful but she is concerned about the potential long-term side effects. Skin testing performed today was negative for evidence of aeroallergen sensitization. We reviewed medication management, including the potential side effects of nasal steroids, and discussed addition of other nasal sprays to help with her post-nasal drainage. Additionally we discussed that there may be a structural component to her symptoms particularly as she has improvement with use of breathe right strips as well as manually supporting the external nares.    - may continue using triamcinolone nasal spray, 1-2 sprays in each nostril daily  - azelastine (ASTELIN) 0.1 % nasal spray; Spray 1-2 sprays into both nostrils 2 times daily  Dispense: 30 mL; Refill: 3  - ipratropium (ATROVENT) 0.06 % nasal spray; Spray 2 sprays into both nostrils 4 times daily as needed for rhinitis  Dispense: 15 mL; Refill: 3  - ALLERGY SKIN TESTS,ALLERGENS  - OTOLARYNGOLOGY REFERRAL    2. Nasal valve collapse - see above    - OTOLARYNGOLOGY REFERRAL      Follow-up in 3 months, sooner if needed      Thank you for allowing me to participate in the care of Sharita Joseph.      Hafsa Deleon MD, FAAAAI  Allergy/Immunology  [facility]  [place]  [facility]      Chart documentation done in part with Dragon Voice Recognition Software. Although reviewed after completion, some word and grammatical errors may remain.

## 2021-04-05 DIAGNOSIS — F32.5 MAJOR DEPRESSION IN COMPLETE REMISSION (H): ICD-10-CM

## 2021-04-05 NOTE — LETTER
April 8, 2021      Sharita Joseph  [address]  [facility] [address]            Your provider has sent a 30 day shaunna refill of sertraline (ZOLOFT) 100 MG tablet. You are due for an appointment for further refills. Please contact the clinic to schedule an appointment for further refills.      Sincerely,       [facility]

## 2021-04-07 NOTE — TELEPHONE ENCOUNTER
Routing refill request to provider for review/approval because:  Pat given x1 and patient did not follow up, please advise    Jeny Hernandez RN, BSN, PHN  [facility]: [place]

## 2021-04-08 RX ORDER — SERTRALINE HYDROCHLORIDE 100 MG/1
TABLET, FILM COATED ORAL
Qty: 30 TABLET | Refills: 0 | Status: SHIPPED | OUTPATIENT
Start: 2021-04-08 | End: 2021-07-01

## 2021-04-10 ENCOUNTER — HEALTH MAINTENANCE LETTER (OUTPATIENT)
Age: 56
End: 2021-04-10

## 2021-04-10 ENCOUNTER — MYC MEDICAL ADVICE (OUTPATIENT)
Dept: FAMILY MEDICINE | Facility: CLINIC | Age: 56
End: 2021-04-10

## 2021-06-07 NOTE — PROGRESS NOTES
I am seeing this patient in consultation for nonallergic rhinitis, nasal valve collapse at the request of the provider Dr. Hafsa Deleon.    Chief Complaint - Nasal obstruction    History of Present Illness - Sharita Joseph is a 55 year old female who presents for evaluation of nasal obstruction. The patient describes symptoms of nasal obstruction, worse at night, for the past many years. The patient notes symptoms most predominately on the right side. She feels pulling cheek on the right side helps. Breath right strips help. The patient notes + allergies. She has been tested for allergies in the past, was positive to a few things in the distant past. Recent allergy testing conducted by Dr. Deleon and reviewed by me were negative. Treatments have included nasal steroids and oral antihistamines. The treatments seem to not help. No history of nasal trauma. No prior history of nasal surgery. No history of smoking. No epistaxis. H/o T&A. I personally reviewed the relevant clinical notes in Epic including the primary care providers note.     She has a left postauricular cyst. It was removed once and now it is back.     Past Medical History -   Patient Active Problem List   Diagnosis     Nonspecific abnormal results of liver function study     Allergic rhinitis due to other allergen     Premenstrual tension syndrome     Other chronic allergic conjunctivitis     Chronic rhinitis     Excessive or frequent menstruation     CARDIOVASCULAR SCREENING; LDL GOAL LESS THAN 160     Anxiety     Health Care Home     Major depression in complete remission (H)     DUB (dysfunctional uterine bleeding)     Vaginal burning     Dermal nevus     Epidermoid cyst of skin     Seborrheic keratosis     FH: breast cancer       Current Medications -   Current Outpatient Medications:      azelastine (ASTELIN) 0.1 % nasal spray, Spray 1-2 sprays into both nostrils 2 times daily, Disp: 30 mL, Rfl: 3     Cholecalciferol (VITAMIN D3 PO), Take by mouth  daily, Disp: , Rfl:      conjugated estrogens (PREMARIN) cream, Place 0.5 g vaginally twice a week, Disp: 30 g, Rfl: 3     diphenhydrAMINE HCl (BENADRYL PO), , Disp: , Rfl:      ipratropium (ATROVENT) 0.06 % nasal spray, Spray 2 sprays into both nostrils 4 times daily as needed for rhinitis, Disp: 15 mL, Rfl: 3     Magnesium 250 MG tablet, Take 1 tablet by mouth daily., Disp: 100 tablet, Rfl: 3     sertraline (ZOLOFT) 100 MG tablet, TAKE 1 TABLET BY MOUTH DAILY. NEED ANNUAL EXAM, Disp: 30 tablet, Rfl: 0     UNABLE TO FIND, MEDICATION NAME:vitamin b, Disp: , Rfl:     Allergies -   Allergies   Allergen Reactions     Adhesive Tape      Gluten Meal      Gluten intolerance       Social History -   Social History     Socioeconomic History     Marital status:      Spouse name: Not on file     Number of children: 1     Years of education: Not on file     Highest education level: Not on file   Occupational History     Employer: [employer]   Social Needs     Financial resource strain: Not on file     Food insecurity     Worry: Not on file     Inability: Not on file     Transportation needs     Medical: Not on file     Non-medical: Not on file   Tobacco Use     Smoking status: Former Smoker     Quit date: 2000     Years since quittin.1     Smokeless tobacco: Never Used   Substance and Sexual Activity     Alcohol use: Yes     Comment: rare     Drug use: No     Sexual activity: Yes     Partners: Male     Birth control/protection: Surgical     Comment:  had vasectomy   Lifestyle     Physical activity     Days per week: Not on file     Minutes per session: Not on file     Stress: Not on file   Relationships     Social connections     Talks on phone: Not on file     Gets together: Not on file     Attends Hinduism service: Not on file     Active member of club or organization: Not on file     Attends meetings of clubs or organizations: Not on file     Relationship status: Not on file     Intimate partner violence      Fear of current or ex partner: Not on file     Emotionally abused: Not on file     Physically abused: Not on file     Forced sexual activity: Not on file   Other Topics Concern     Parent/sibling w/ CABG, MI or angioplasty before 65F 55M? No   Social History Narrative     Not on file       Family History -   Family History   Problem Relation Age of Onset     Osteoporosis Mother      Breast Cancer Mother         Dx over 20 years ago, age 46     Arthritis Mother      Psychotic Disorder Mother      Heart Disease Mother      Lipids Father      Hypertension Father      Cerebrovascular Disease Father      Heart Disease Father      Dementia Father      Cancer Maternal Grandfather         lung, 25 years ago     Alzheimer Disease Paternal Grandmother         possible     Cancer - colorectal Paternal Grandfather         age of dx ?     Obesity Brother      Hypertension Brother      Melanoma No family hx of      Review of Systems - As per HPI and PMHx, no ear or skin infections. otherwise 7 system review of the head and neck is negative.    Physical Exam  BP 99/63   Pulse 77   Resp 16   LMP 2014   SpO2 98%   General - The patient is in no distress. Alert and oriented to person and place, answers questions and cooperates with examination appropriately.   Neurologic - CN II-XII are grossly intact. No focal neurologic deficits.   Voice and Breathing - The patient was breathing comfortably without the use of accessory muscles. There was no wheezing, stridor, or stertor.  The patients voice was clear and strong.  Eyes - Extraocular movements intact. Sclera were not icteric or injected, conjunctiva were pink and moist.  Mouth - Examination of the oral cavity showed pink, healthy oral mucosa. No lesions or ulcerations noted.  The tongue was mobile and midline, and the dentition were in good condition.    Throat - The walls of the oropharynx were smooth, pink, moist, symmetric, and had no lesions or ulcerations.  The  tonsillar pillars and soft palate were symmetric.  The uvula was midline on elevation.  Neck -she has a very small papule, approximately 3 mm behind her left ear lobule.  She also has a scar from likely sebaceous cyst excision at the site.  This could be residual scar tissue or possibly a little sebaceous cyst but its not growing.  No infection.  The rest of the neck is soft, non-tender. Palpation of the occipital, submental, submandibular, internal jugular chain, and supraclavicular nodes did not demonstrate any abnormal lymph nodes or masses. No parotid masses. Palpation of the thyroid was soft and smooth, with no nodules or goiter appreciated.  The trachea was mobile and midline.  Cardiovascular - carotid pulses are 2+ bilaterally, regular rhythm  Nose -patient's nose is over projected.  She has a right caudal septal deflection and her medial crural footplates are hanging causing widening of the columellar base.  These factors are causing significant right external nasal valve narrowing and collapse upon inspiration.  A similar finding on the left but not as prominent.  Intranasally she also has septal deviation with septal spurs.  No significant congestion, no polyps or purulence.      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  This can be corrected but requires functional rhinoplasty.  Therefore I recommend she see Dr. Mary Gale at the [facility] for evaluation and treatment.  Referral was provided.    She has a very small papule in the right postauricular area.  I recommend observation for now as this is not changing.  I do not think it is residual sebaceous cyst but rather scarring or sequelae from treatment of her prior sebaceous cyst.    Carter Chavez MD  Otolaryngology  [facility]

## 2021-06-08 ENCOUNTER — OFFICE VISIT (OUTPATIENT)
Dept: OTOLARYNGOLOGY | Facility: CLINIC | Age: 56
End: 2021-06-08
Attending: ALLERGY & IMMUNOLOGY
Payer: COMMERCIAL

## 2021-06-08 VITALS
RESPIRATION RATE: 16 BRPM | OXYGEN SATURATION: 98 % | HEART RATE: 77 BPM | DIASTOLIC BLOOD PRESSURE: 63 MMHG | SYSTOLIC BLOOD PRESSURE: 99 MMHG

## 2021-06-08 DIAGNOSIS — L72.3 SEBACEOUS CYST: ICD-10-CM

## 2021-06-08 DIAGNOSIS — J34.2 NASAL SEPTAL DEVIATION: ICD-10-CM

## 2021-06-08 DIAGNOSIS — J34.89 NASAL OBSTRUCTION: ICD-10-CM

## 2021-06-08 DIAGNOSIS — J34.829 NASAL VALVE COLLAPSE: Primary | ICD-10-CM

## 2021-06-08 PROCEDURE — 99203 OFFICE O/P NEW LOW 30 MIN: CPT | Performed by: OTOLARYNGOLOGY

## 2021-06-08 NOTE — TELEPHONE ENCOUNTER
FUTURE VISIT INFORMATION      FUTURE VISIT INFORMATION:    Date: 9/15/21    Time: 1:20 PM    Location: [facility]  REFERRAL INFORMATION:    Referring provider:  Dr. Carter Chavez    Referring providers clinic:  [facility]    Reason for visit/diagnosis: Nasal Valve Collapse, Nasal Obstruction, Nasal Septal Deivation    RECORDS REQUESTED FROM:       Clinic name Comments Records Status Imaging Status   [facility] 6/8/21 - ENT OV with Dr. Chavez  3/18/21 - ALLERGY OV with Dr. Pancho Carvalho

## 2021-06-08 NOTE — LETTER
6/8/2021         RE: Sharita Joseph  [address]  [facility] [address]        Dear Colleague,    Thank you for referring your patient, Sharita Joseph, to the [facility]. Please see a copy of my visit note below.    I am seeing this patient in consultation for nonallergic rhinitis, nasal valve collapse at the request of the provider Dr. Hafsa Deleon.    Chief Complaint - Nasal obstruction    History of Present Illness - Sharita Joseph is a 55 year old female who presents for evaluation of nasal obstruction. The patient describes symptoms of nasal obstruction, worse at night, for the past many years. The patient notes symptoms most predominately on the right side. She feels pulling cheek on the right side helps. Breath right strips help. The patient notes + allergies. She has been tested for allergies in the past, was positive to a few things in the distant past. Recent allergy testing conducted by Dr. Deleon and reviewed by me were negative. Treatments have included nasal steroids and oral antihistamines. The treatments seem to not help. No history of nasal trauma. No prior history of nasal surgery. No history of smoking. No epistaxis. H/o T&A. I personally reviewed the relevant clinical notes in Epic including the primary care providers note.     She has a left postauricular cyst. It was removed once and now it is back.     Past Medical History -   Patient Active Problem List   Diagnosis     Nonspecific abnormal results of liver function study     Allergic rhinitis due to other allergen     Premenstrual tension syndrome     Other chronic allergic conjunctivitis     Chronic rhinitis     Excessive or frequent menstruation     CARDIOVASCULAR SCREENING; LDL GOAL LESS THAN 160     Anxiety     Health Care Home     Major depression in complete remission (H)     DUB (dysfunctional uterine bleeding)     Vaginal burning     Dermal nevus     Epidermoid cyst of skin     Seborrheic keratosis      FH: breast cancer       Current Medications -   Current Outpatient Medications:      azelastine (ASTELIN) 0.1 % nasal spray, Spray 1-2 sprays into both nostrils 2 times daily, Disp: 30 mL, Rfl: 3     Cholecalciferol (VITAMIN D3 PO), Take by mouth daily, Disp: , Rfl:      conjugated estrogens (PREMARIN) cream, Place 0.5 g vaginally twice a week, Disp: 30 g, Rfl: 3     diphenhydrAMINE HCl (BENADRYL PO), , Disp: , Rfl:      ipratropium (ATROVENT) 0.06 % nasal spray, Spray 2 sprays into both nostrils 4 times daily as needed for rhinitis, Disp: 15 mL, Rfl: 3     Magnesium 250 MG tablet, Take 1 tablet by mouth daily., Disp: 100 tablet, Rfl: 3     sertraline (ZOLOFT) 100 MG tablet, TAKE 1 TABLET BY MOUTH DAILY. NEED ANNUAL EXAM, Disp: 30 tablet, Rfl: 0     UNABLE TO FIND, MEDICATION NAME:vitamin b, Disp: , Rfl:     Allergies -   Allergies   Allergen Reactions     Adhesive Tape      Gluten Meal      Gluten intolerance       Social History -   Social History     Socioeconomic History     Marital status:      Spouse name: Not on file     Number of children: 1     Years of education: Not on file     Highest education level: Not on file   Occupational History     Employer: [employer]   Social Needs     Financial resource strain: Not on file     Food insecurity     Worry: Not on file     Inability: Not on file     Transportation needs     Medical: Not on file     Non-medical: Not on file   Tobacco Use     Smoking status: Former Smoker     Quit date: 2000     Years since quittin.1     Smokeless tobacco: Never Used   Substance and Sexual Activity     Alcohol use: Yes     Comment: rare     Drug use: No     Sexual activity: Yes     Partners: Male     Birth control/protection: Surgical     Comment:  had vasectomy   Lifestyle     Physical activity     Days per week: Not on file     Minutes per session: Not on file     Stress: Not on file   Relationships     Social connections     Talks on phone: Not on file      Gets together: Not on file     Attends [employer]ism service: Not on file     Active member of club or organization: Not on file     Attends meetings of clubs or organizations: Not on file     Relationship status: Not on file     Intimate partner violence     Fear of current or ex partner: Not on file     Emotionally abused: Not on file     Physically abused: Not on file     Forced sexual activity: Not on file   Other Topics Concern     Parent/sibling w/ CABG, MI or angioplasty before 65F 55M? No   Social History Narrative     Not on file       Family History -   Family History   Problem Relation Age of Onset     Osteoporosis Mother      Breast Cancer Mother         Dx over 20 years ago, age 46     Arthritis Mother      Psychotic Disorder Mother      Heart Disease Mother      Lipids Father      Hypertension Father      Cerebrovascular Disease Father      Heart Disease Father      Dementia Father      Cancer Maternal Grandfather         lung, 25 years ago     Alzheimer Disease Paternal Grandmother         possible     Cancer - colorectal Paternal Grandfather         age of dx ?     Obesity Brother      Hypertension Brother      Melanoma No family hx of      Review of Systems - As per HPI and PMHx, no ear or skin infections. otherwise 7 system review of the head and neck is negative.    Physical Exam  BP 99/63   Pulse 77   Resp 16   LMP 2014   SpO2 98%   General - The patient is in no distress. Alert and oriented to person and place, answers questions and cooperates with examination appropriately.   Neurologic - CN II-XII are grossly intact. No focal neurologic deficits.   Voice and Breathing - The patient was breathing comfortably without the use of accessory muscles. There was no wheezing, stridor, or stertor.  The patients voice was clear and strong.  Eyes - Extraocular movements intact. Sclera were not icteric or injected, conjunctiva were pink and moist.  Mouth - Examination of the oral cavity showed  pink, healthy oral mucosa. No lesions or ulcerations noted.  The tongue was mobile and midline, and the dentition were in good condition.    Throat - The walls of the oropharynx were smooth, pink, moist, symmetric, and had no lesions or ulcerations.  The tonsillar pillars and soft palate were symmetric.  The uvula was midline on elevation.  Neck -she has a very small papule, approximately 3 mm behind her left ear lobule.  She also has a scar from likely sebaceous cyst excision at the site.  This could be residual scar tissue or possibly a little sebaceous cyst but its not growing.  No infection.  The rest of the neck is soft, non-tender. Palpation of the occipital, submental, submandibular, internal jugular chain, and supraclavicular nodes did not demonstrate any abnormal lymph nodes or masses. No parotid masses. Palpation of the thyroid was soft and smooth, with no nodules or goiter appreciated.  The trachea was mobile and midline.  Cardiovascular - carotid pulses are 2+ bilaterally, regular rhythm  Nose -patient's nose is over projected.  She has a right caudal septal deflection and her medial crural footplates are hanging causing widening of the columellar base.  These factors are causing significant right external nasal valve narrowing and collapse upon inspiration.  A similar finding on the left but not as prominent.  Intranasally she also has septal deviation with septal spurs.  No significant congestion, no polyps or purulence.      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  This can be corrected but requires functional rhinoplasty.  Therefore I recommend she see Dr. Mary Gale at the [facility] for evaluation and treatment.  Referral was provided.    She has a very small papule in the right postauricular area.  I recommend observation for now as this is not changing.  I do not think it is residual sebaceous  cyst but rather scarring or sequelae from treatment of her prior sebaceous cyst.    Carter Chavez MD  Otolaryngology  [facility]            Again, thank you for allowing me to participate in the care of your patient.        Sincerely,        Carter Chavez MD

## 2021-06-14 ENCOUNTER — TELEPHONE (OUTPATIENT)
Dept: OTOLARYNGOLOGY | Facility: CLINIC | Age: 56
End: 2021-06-14

## 2021-06-30 ASSESSMENT — ENCOUNTER SYMPTOMS
ABDOMINAL PAIN: 0
BREAST MASS: 0
CONSTIPATION: 0
DIZZINESS: 0
ARTHRALGIAS: 0
FEVER: 0
NERVOUS/ANXIOUS: 0
PALPITATIONS: 0
HEMATURIA: 0
HEADACHES: 0
NAUSEA: 0
DIARRHEA: 0
CHILLS: 0
FREQUENCY: 0
MYALGIAS: 0
WEAKNESS: 0
SORE THROAT: 0
DYSURIA: 0
COUGH: 0
EYE PAIN: 0
HEARTBURN: 0
HEMATOCHEZIA: 0
JOINT SWELLING: 0
SHORTNESS OF BREATH: 0
PARESTHESIAS: 0

## 2021-07-01 ENCOUNTER — OFFICE VISIT (OUTPATIENT)
Dept: FAMILY MEDICINE | Facility: CLINIC | Age: 56
End: 2021-07-01
Payer: COMMERCIAL

## 2021-07-01 VITALS
HEIGHT: 69 IN | DIASTOLIC BLOOD PRESSURE: 62 MMHG | SYSTOLIC BLOOD PRESSURE: 99 MMHG | WEIGHT: 142 LBS | BODY MASS INDEX: 21.03 KG/M2 | TEMPERATURE: 98.1 F | OXYGEN SATURATION: 97 % | HEART RATE: 75 BPM

## 2021-07-01 DIAGNOSIS — F32.5 MAJOR DEPRESSION IN COMPLETE REMISSION (H): ICD-10-CM

## 2021-07-01 DIAGNOSIS — Z00.00 ROUTINE GENERAL MEDICAL EXAMINATION AT A HEALTH CARE FACILITY: Primary | ICD-10-CM

## 2021-07-01 DIAGNOSIS — Z12.31 ENCOUNTER FOR SCREENING MAMMOGRAM FOR MALIGNANT NEOPLASM OF BREAST: ICD-10-CM

## 2021-07-01 DIAGNOSIS — F41.9 ANXIETY: ICD-10-CM

## 2021-07-01 DIAGNOSIS — R53.83 OTHER FATIGUE: ICD-10-CM

## 2021-07-01 DIAGNOSIS — R79.89 ELEVATED LFTS: ICD-10-CM

## 2021-07-01 DIAGNOSIS — Z12.11 SCREENING FOR COLON CANCER: ICD-10-CM

## 2021-07-01 DIAGNOSIS — Z80.3 FH: BREAST CANCER: ICD-10-CM

## 2021-07-01 LAB
ALBUMIN SERPL-MCNC: 3.8 G/DL (ref 3.4–5)
ALP SERPL-CCNC: 93 U/L (ref 40–150)
ALT SERPL W P-5'-P-CCNC: 31 U/L (ref 0–50)
ANION GAP SERPL CALCULATED.3IONS-SCNC: 3 MMOL/L (ref 3–14)
AST SERPL W P-5'-P-CCNC: 30 U/L (ref 0–45)
BASOPHILS # BLD AUTO: 0 10E9/L (ref 0–0.2)
BASOPHILS NFR BLD AUTO: 0.5 %
BILIRUB SERPL-MCNC: 0.4 MG/DL (ref 0.2–1.3)
BUN SERPL-MCNC: 13 MG/DL (ref 7–30)
CALCIUM SERPL-MCNC: 9 MG/DL (ref 8.5–10.1)
CHLORIDE SERPL-SCNC: 103 MMOL/L (ref 94–109)
CO2 SERPL-SCNC: 31 MMOL/L (ref 20–32)
CREAT SERPL-MCNC: 0.89 MG/DL (ref 0.52–1.04)
DEPRECATED CALCIDIOL+CALCIFEROL SERPL-MC: 42 UG/L (ref 20–75)
DIFFERENTIAL METHOD BLD: NORMAL
EOSINOPHIL # BLD AUTO: 0 10E9/L (ref 0–0.7)
EOSINOPHIL NFR BLD AUTO: 0.5 %
ERYTHROCYTE [DISTWIDTH] IN BLOOD BY AUTOMATED COUNT: 13.1 % (ref 10–15)
GFR SERPL CREATININE-BSD FRML MDRD: 73 ML/MIN/{1.73_M2}
GLUCOSE SERPL-MCNC: 86 MG/DL (ref 70–99)
HCT VFR BLD AUTO: 41.5 % (ref 35–47)
HGB BLD-MCNC: 13.5 G/DL (ref 11.7–15.7)
LYMPHOCYTES # BLD AUTO: 1.3 10E9/L (ref 0.8–5.3)
LYMPHOCYTES NFR BLD AUTO: 28.4 %
MCH RBC QN AUTO: 30 PG (ref 26.5–33)
MCHC RBC AUTO-ENTMCNC: 32.5 G/DL (ref 31.5–36.5)
MCV RBC AUTO: 92 FL (ref 78–100)
MONOCYTES # BLD AUTO: 0.4 10E9/L (ref 0–1.3)
MONOCYTES NFR BLD AUTO: 9.1 %
NEUTROPHILS # BLD AUTO: 2.7 10E9/L (ref 1.6–8.3)
NEUTROPHILS NFR BLD AUTO: 61.5 %
PLATELET # BLD AUTO: 231 10E9/L (ref 150–450)
POTASSIUM SERPL-SCNC: 4.1 MMOL/L (ref 3.4–5.3)
PROT SERPL-MCNC: 7.4 G/DL (ref 6.8–8.8)
RBC # BLD AUTO: 4.5 10E12/L (ref 3.8–5.2)
SODIUM SERPL-SCNC: 137 MMOL/L (ref 133–144)
TSH SERPL DL<=0.005 MIU/L-ACNC: 0.94 MU/L (ref 0.4–4)
VIT B12 SERPL-MCNC: 361 PG/ML (ref 193–986)
WBC # BLD AUTO: 4.4 10E9/L (ref 4–11)

## 2021-07-01 PROCEDURE — 80050 GENERAL HEALTH PANEL: CPT | Performed by: PHYSICIAN ASSISTANT

## 2021-07-01 PROCEDURE — 36415 COLL VENOUS BLD VENIPUNCTURE: CPT | Performed by: PHYSICIAN ASSISTANT

## 2021-07-01 PROCEDURE — 99213 OFFICE O/P EST LOW 20 MIN: CPT | Mod: 25 | Performed by: PHYSICIAN ASSISTANT

## 2021-07-01 PROCEDURE — 99396 PREV VISIT EST AGE 40-64: CPT | Performed by: PHYSICIAN ASSISTANT

## 2021-07-01 PROCEDURE — 82607 VITAMIN B-12: CPT | Performed by: PHYSICIAN ASSISTANT

## 2021-07-01 PROCEDURE — 82306 VITAMIN D 25 HYDROXY: CPT | Performed by: PHYSICIAN ASSISTANT

## 2021-07-01 RX ORDER — SERTRALINE HYDROCHLORIDE 100 MG/1
TABLET, FILM COATED ORAL
Qty: 90 TABLET | Refills: 3 | Status: SHIPPED | OUTPATIENT
Start: 2021-07-01 | End: 2022-07-18

## 2021-07-01 ASSESSMENT — PATIENT HEALTH QUESTIONNAIRE - PHQ9: SUM OF ALL RESPONSES TO PHQ QUESTIONS 1-9: 7

## 2021-07-01 ASSESSMENT — ENCOUNTER SYMPTOMS
COUGH: 0
FEVER: 0
HEMATURIA: 0
NAUSEA: 0
JOINT SWELLING: 0
EYE PAIN: 0
HEADACHES: 0
SHORTNESS OF BREATH: 0
HEMATOCHEZIA: 0
ABDOMINAL PAIN: 0
CHILLS: 0
CONSTIPATION: 0
MYALGIAS: 0
PARESTHESIAS: 0
FREQUENCY: 0
NERVOUS/ANXIOUS: 0
BREAST MASS: 0
SORE THROAT: 0
DIZZINESS: 0
HEARTBURN: 0
DIARRHEA: 0
DYSURIA: 0
PALPITATIONS: 0
WEAKNESS: 0
ARTHRALGIAS: 0

## 2021-07-01 ASSESSMENT — MIFFLIN-ST. JEOR: SCORE: 1295.55

## 2021-07-01 NOTE — PROGRESS NOTES
SUBJECTIVE:   CC: Sharita Joseph is an 55 year old woman who presents for preventive health visit.       Patient has been advised of split billing requirements and indicates understanding: Yes  Healthy Habits:     Getting at least 3 servings of Calcium per day:  NO    Bi-annual eye exam:  NO    Dental care twice a year:  Yes    Sleep apnea or symptoms of sleep apnea:  Daytime drowsiness and Sleep apnea    Diet:  Gluten-free/reduced and Other    Frequency of exercise:  6-7 days/week    Duration of exercise:  30-45 minutes    Taking medications regularly:  No    Barriers to taking medications:  Side effects    Medication side effects:  Other    PHQ-2 Total Score: 1    Additional concerns today:  Yes    Nasal valve colapse - consult in September with plastics. Complains that it is hard to breathe and that she has to manually support her nose which improves her breathing. Hard to sleep. Feels tired all the time and has been napping more. Fatigue: She was seen by Allergist and they noted that her symptoms improved with breath right strips  Patient's  does note she snores.     Concerned about elevated liver enzymes- history of elevated LFT in 2017-normal in 2019. Occasionally has LLQ pain, but says that she has had it for years and says that it is related to her diet. She is gluten free and does not eat legumes. Otherwise, No abdominal pain. Denies N/V after meals. No urinary symptoms or changes with urine or BM. Most GI problems controlled with diet.      had kidney transplant and rejecting. Father passed away recently. Going to counseling every 2 weeks. Not regularly. She sees counselor only when needed. Zoloft is helping.     Estrogen cream causing hot flashes takes once daily - says that the smallest amount causes hot flashes. She avoids using it when she knows she has to present and says that if she does she will be drenched in her sweat. [place] is painful. Was advised to do a lower amount 2  times per week, by GYN but patient has not started that.         Today's PHQ-2 Score:   PHQ-2 (  [employer]) 2021   Q1: Little interest or pleasure in doing things 0   Q2: Feeling down, depressed or hopeless 1   PHQ-2 Score 1   Q1: Little interest or pleasure in doing things Not at all   Q2: Feeling down, depressed or hopeless Several days   PHQ-2 Score 1       Abuse: Current or Past (Physical, Sexual or Emotional) - No  Do you feel safe in your environment? Yes    Have you ever done Advance Care Planning? (For example, a Health Directive, POLST, or a discussion with a medical provider or your loved ones about your wishes): No, advance care planning information given to patient to review.  Patient declined advance care planning discussion at this time.    Social History     Tobacco Use     Smoking status: Former Smoker     Packs/day: 0.00     Years: 15.00     Pack years: 0.00     Types: Cigarettes     Start date: 1982     Quit date: 2000     Years since quittin.1     Smokeless tobacco: Never Used   Substance Use Topics     Alcohol use: Yes     Comment: rare     If you drink alcohol do you typically have >3 drinks per day or >7 drinks per week? No    Alcohol Use 2021   Prescreen: >3 drinks/day or >7 drinks/week? -   Prescreen: >3 drinks/day or >7 drinks/week? No       Reviewed orders with patient.  Reviewed health maintenance and updated orders accordingly - Yes  Lab work is in process    Breast Cancer Screening:    FHS-7:   Breast CA Risk Assessment (FHS-7) 2021   Did any of your first-degree relatives have breast or ovarian cancer? Yes   Did any of your relatives have bilateral breast cancer? Unknown   Did any man in your family have breast cancer? No   Did any woman in your family have breast and ovarian cancer? Yes   Did any woman in your family have breast cancer before age 50 y? Yes   Do you have 2 or more relatives with breast and/or ovarian cancer? No   Do you have 2 or more  relatives with breast and/or bowel cancer? Yes     click delete button to remove this line now  Mammogram Screening: Recommended mammography every 1-2 years with patient discussion and risk factor consideration  Pertinent mammograms are reviewed under the imaging tab.    History of abnormal Pap smear: NO - age 30-65 PAP every 5 years with negative HPV co-testing recommended  PAP / HPV Latest Ref Rng & Units 7/26/2018 11/9/2015 9/18/2012   PAP - NIL NIL NIL   HPV 16 DNA NEG:Negative Negative Negative -   HPV 18 DNA NEG:Negative Negative Negative -   OTHER HR HPV NEG:Negative Negative Negative -     Reviewed and updated as needed this visit by clinical staff  Tobacco  Allergies  Meds  Problems  Med Hx  Surg Hx  Fam Hx          Reviewed and updated as needed this visit by Provider  Tobacco  Allergies  Meds  Problems  Med Hx  Surg Hx  Fam Hx             Review of Systems   Constitutional: Negative for chills and fever.   HENT: Negative for congestion, ear pain, hearing loss and sore throat.    Eyes: Negative for pain and visual disturbance.   Respiratory: Negative for cough and shortness of breath.    Cardiovascular: Negative for chest pain, palpitations and peripheral edema.   Gastrointestinal: Negative for abdominal pain, constipation, diarrhea, heartburn, hematochezia and nausea.   Breasts:  Negative for tenderness, breast mass and discharge.   Genitourinary: Negative for dysuria, frequency, genital sores, hematuria, pelvic pain, urgency, vaginal bleeding and vaginal discharge.   Musculoskeletal: Negative for arthralgias, joint swelling and myalgias.   Skin: Negative for rash.   Neurological: Negative for dizziness, weakness, headaches and paresthesias.   Psychiatric/Behavioral: Negative for mood changes. The patient is not nervous/anxious.         OBJECTIVE:   BP 99/62 (BP Location: Right arm, Patient Position: Chair, Cuff Size: Adult Regular)   Pulse 75   Temp 98.1  F (36.7  C) (Oral)   Ht 1.74 m  "(5' 8.5\")   Wt 64.4 kg (142 lb)   LMP 08/27/2014   SpO2 97%   Breastfeeding No   BMI 21.28 kg/m    Physical Exam  GENERAL APPEARANCE: healthy, alert and no distress  EYES: Eyes grossly normal to inspection, PERRL and conjunctivae and sclerae normal  HENT: ear canals and TM's normal, nose and mouth without ulcers or lesions, oropharynx clear and oral mucous membranes moist  NECK: no adenopathy, no asymmetry, masses, or scars and thyroid normal to palpation  RESP: lungs clear to auscultation - no rales, rhonchi or wheezes  BREAST: normal without masses, tenderness or nipple discharge and no palpable axillary masses or adenopathy  CV: regular rate and rhythm, normal S1 S2, no S3 or S4, no murmur, click or rub, no peripheral edema  ABDOMEN: soft, nontender, no hepatosplenomegaly, no masses and bowel sounds normal  MS: no musculoskeletal defects are noted and gait is age appropriate without ataxia  SKIN: no suspicious lesions or rashes  NEURO: Normal strength and tone, sensory exam grossly normal, mentation intact and speech normal   PSYCH: Patient is a bit anxious         ASSESSMENT/PLAN:   1. Routine general medical examination at a health care facility  Addressed. See orders.   Patient had COVID-19 vaccination, will send us a copy of her vaccination card.     2. Major depression in complete remission (H)  Zoloft well tolerated. No issues with side effects. She is going through major life stressors right now with  being immunocompromised and rejecting his kidney transplant and father just passed away. Patient seeing counselor every 2 weeks or so. Lack of good sleep may be contributing to her overall mood as well.   - sertraline (ZOLOFT) 100 MG tablet; TAKE 1 TABLET BY MOUTH DAILY. NEED ANNUAL EXAM  Dispense: 90 tablet; Refill: 3    3. Anxiety  Noted above.     4. FH: breast cancer  - MA Screen Bilateral w/Trung; Future    5. Encounter for screening mammogram for malignant neoplasm of breast  Same as above. " "    6. Screening for colon cancer  Last colonoscopy 5/15/2012. Per gastroenterologist's note, patient had an incomplete colonoscopy due to discomfort, then switched to CT colography. Rec'ed to repeat colonoscopy in 5 years with deep sedation. Patient declined a colonoscopy today but okay with FIT. We discussed about her risk of colon cancer given her fhx of mother having polyps and paternal grandfather having colon cx. Unknown age of dx.     - Fecal colorectal cancer screen (FIT); Future    7. Other fatigue  Patient may have structural component to her nares that may contribute to her daily fatigue and inability to breathe through her nose. Though this could be contributing to her fatigue, I am still considering other diagnoses such as her thyroid, rbc count, vitamins, and NOAH.   - TSH with free T4 reflex  - CBC with platelets differential  - Vitamin B12  - Vitamin D Deficiency  - SLEEP EVALUATION & MANAGEMENT REFERRAL - [facility] [place]  [phone] (Age 15 and up); Future    8. Elevated LFTs  Patient achieved spontaneous resolution of ALT/AST in 2019. Comprehensive metabolic panel from 12/2019 shows normal liver labs. Patient however, would like to recheck labs.   - Comprehensive metabolic panel    Patient has been advised of split billing requirements and indicates understanding: Yes  COUNSELING:  Reviewed preventive health counseling, as reflected in patient instructions       Regular exercise       Healthy diet/nutrition       Alcohol Use       Colon cancer screening    Estimated body mass index is 21.28 kg/m  as calculated from the following:    Height as of this encounter: 1.74 m (5' 8.5\").    Weight as of this encounter: 64.4 kg (142 lb).        She reports that she quit smoking about 21 years ago. Her smoking use included cigarettes. She started smoking about 39 years ago. She smoked 0.00 packs per day for 15.00 years. She has never used smokeless tobacco.      Counseling " Resources:  ATP IV Guidelines  Pooled Cohorts Equation Calculator  Breast Cancer Risk Calculator  BRCA-Related Cancer Risk Assessment: FHS-7 Tool  FRAX Risk Assessment  ICSI Preventive Guidelines  Dietary Guidelines for Americans, 2010  USDA's MyPlate  ASA Prophylaxis  Lung CA Screening    Cherelle Langley PA-S2   Wendy Calderón PA-C  [facility]

## 2021-07-01 NOTE — PATIENT INSTRUCTIONS
1. Schedule mammogram.   2. Return your stool test  3. Send message with COVID-19 vaccine card information.     Preventive Health Recommendations  Female Ages 50 - 64    Yearly exam: See your health care provider every year in order to  o Review health changes.   o Discuss preventive care.    o Review your medicines if your doctor has prescribed any.      Get a Pap test every three years (unless you have an abnormal result and your provider advises testing more often).    If you get Pap tests with HPV test, you only need to test every 5 years, unless you have an abnormal result.     You do not need a Pap test if your uterus was removed (hysterectomy) and you have not had cancer.    You should be tested each year for STDs (sexually transmitted diseases) if you're at risk.     Have a mammogram every 1 to 2 years.    Have a colonoscopy at age 50, or have a yearly FIT test (stool test). These exams screen for colon cancer.      Have a cholesterol test every 5 years, or more often if advised.    Have a diabetes test (fasting glucose) every three years. If you are at risk for diabetes, you should have this test more often.     If you are at risk for osteoporosis (brittle bone disease), think about having a bone density scan (DEXA).    Shots: Get a flu shot each year. Get a tetanus shot every 10 years.    Nutrition:     Eat at least 5 servings of fruits and vegetables each day.    Eat whole-grain bread, whole-wheat pasta and brown rice instead of white grains and rice.    Get adequate Calcium and Vitamin D.     Lifestyle    Exercise at least 150 minutes a week (30 minutes a day, 5 days a week). This will help you control your weight and prevent disease.    Limit alcohol to one drink per day.    No smoking.     Wear sunscreen to prevent skin cancer.     See your dentist every six months for an exam and cleaning.    See your eye doctor every 1 to 2 years.

## 2021-07-02 NOTE — RESULT ENCOUNTER NOTE
Your labs are all normal. No obvious cause for the fatigue. I would recommend that you consider the sleep study. Your liver function tests are all normal.   Wendy Calderón PA-C

## 2021-07-08 ENCOUNTER — ANCILLARY PROCEDURE (OUTPATIENT)
Dept: MAMMOGRAPHY | Facility: CLINIC | Age: 56
End: 2021-07-08
Attending: PHYSICIAN ASSISTANT
Payer: COMMERCIAL

## 2021-07-08 DIAGNOSIS — Z80.3 FH: BREAST CANCER: ICD-10-CM

## 2021-07-08 PROCEDURE — 77063 BREAST TOMOSYNTHESIS BI: CPT | Mod: TC | Performed by: RADIOLOGY

## 2021-07-08 PROCEDURE — 77067 SCR MAMMO BI INCL CAD: CPT | Mod: TC | Performed by: RADIOLOGY

## 2021-08-25 VITALS — WEIGHT: 135.8 LBS | HEIGHT: 69 IN | BODY MASS INDEX: 20.11 KG/M2

## 2021-08-25 ASSESSMENT — SLEEP AND FATIGUE QUESTIONNAIRES
HOW LIKELY ARE YOU TO NOD OFF OR FALL ASLEEP WHEN YOU ARE A PASSENGER IN A CAR FOR AN HOUR WITHOUT A BREAK: MODERATE CHANCE OF DOZING
HOW LIKELY ARE YOU TO NOD OFF OR FALL ASLEEP WHILE SITTING INACTIVE IN A PUBLIC PLACE: WOULD NEVER DOZE
HOW LIKELY ARE YOU TO NOD OFF OR FALL ASLEEP WHILE SITTING AND READING: WOULD NEVER DOZE
HOW LIKELY ARE YOU TO NOD OFF OR FALL ASLEEP WHILE WATCHING TV: WOULD NEVER DOZE
HOW LIKELY ARE YOU TO NOD OFF OR FALL ASLEEP WHILE LYING DOWN TO REST IN THE AFTERNOON WHEN CIRCUMSTANCES PERMIT: SLIGHT CHANCE OF DOZING
HOW LIKELY ARE YOU TO NOD OFF OR FALL ASLEEP WHILE SITTING QUIETLY AFTER LUNCH WITHOUT ALCOHOL: WOULD NEVER DOZE
HOW LIKELY ARE YOU TO NOD OFF OR FALL ASLEEP IN A CAR, WHILE STOPPED FOR A FEW MINUTES IN TRAFFIC: WOULD NEVER DOZE
HOW LIKELY ARE YOU TO NOD OFF OR FALL ASLEEP WHILE SITTING AND TALKING TO SOMEONE: WOULD NEVER DOZE

## 2021-08-25 ASSESSMENT — MIFFLIN-ST. JEOR: SCORE: 1267.42

## 2021-08-25 NOTE — PATIENT INSTRUCTIONS
Your BMI is Body mass index is 20.35 kg/m .  Weight management is a personal decision.  If you are interested in exploring weight loss strategies, the following discussion covers the approaches that may be successful. Body mass index (BMI) is one way to tell whether you are at a healthy weight, overweight, or obese. It measures your weight in relation to your height.  A BMI of 18.5 to 24.9 is in the healthy range. A person with a BMI of 25 to 29.9 is considered overweight, and someone with a BMI of 30 or greater is considered obese. More than two-thirds of [place] adults are considered overweight or obese.  Being overweight or obese increases the risk for further weight gain. Excess weight may lead to heart disease and diabetes.  Creating and following plans for healthy eating and physical activity may help you improve your health.  Weight control is part of healthy lifestyle and includes exercise, emotional health, and healthy eating habits. Careful eating habits lifelong are the mainstay of weight control. Though there are significant health benefits from weight loss, long-term weight loss with diet alone may be very difficult to achieve- studies show long-term success with dietary management in less than 10% of people. Attaining a healthy weight may be especially difficult to achieve in those with severe obesity. In some cases, medications, devices and surgical management might be considered.  What can you do?  If you are overweight or obese and are interested in methods for weight loss, you should discuss this with your provider.     Consider reducing daily calorie intake by 500 calories.     Keep a food journal.     Avoiding skipping meals, consider cutting portions instead.    Diet combined with exercise helps maintain muscle while optimizing fat loss. Strength training is particularly important for building and maintaining muscle mass. Exercise helps reduce stress, increase energy, and improves fitness.  Increasing exercise without diet control, however, may not burn enough calories to loose weight.       Start walking three days a week 10-20 minutes at a time    Work towards walking thirty minutes five days a week     Eventually, increase the speed of your walking for 1-2 minutes at time    In addition, we recommend that you review healthy lifestyles and methods for weight loss available through the National Institutes of Health patient information sites:  http://win.niddk.nih.gov/publications/index.htm    And look into health and wellness programs that may be available through your health insurance provider, employer, local community center, or rose club.        MY CONTACT NUMBERS ARE: [phone]  DOCTOR : MANJIT Herrmann  SLEEP CENTER :   CPAP EQUIPMENT :    IF I HAVE SLEEP APNEA.....  WHERE CAN I FIND MORE INFORMATION?    American Academy of Sleep Medicine Patient information on sleep disorders:  http://yoursleep.aasmnet.org    THINGS TO REMEMBER  In most situations, sleep apnea is a lifelong disease that must be managed with daily therapy. Untreated disease, when severe, may result in an increased risk for an array of problems from heart disease to mood changes, car accidents and shorter lifespan.    CPAP -  WHY AND HOW?  Continuous positive airway pressure, or CPAP, is the most effective treatment for obstructive sleep apnea. A decision to use CPAP is a major step forward in the pursuit of a healthier life. The successful use of CPAP will help you breathe easier, sleep better and live healthier. Using CPAP can be a positive experience if you keep these le points in mind:  1. Commitment  CPAP is not a quick fix for your problem. It involves a long-term commitment to improve your sleep and your health.    2. Communication  Stay in close communication with both your sleep doctor and your CPAP supplier. Ask lots of questions and seek help when you need it.    3. Consistency  Use CPAP all night, every night  "and for every nap. You will receive the maximum health benefits from CPAP when you use it every time that you sleep. This will also make it easier for your body to adjust to the treatment.    4. Correction  The first machine and mask that you try may not be the best ones for you. Work with your sleep doctor and your CPAP supplier to make corrections to your equipment selection. Ask about trying a different type of machine or mask if you have ongoing problems. Make sure that your mask is a good fit and learn to use your equipment properly.    5. Challenge  Tell a family member or close friend to ask you each morning if you used your CPAP the previous night. Have someone to challenge you to give it your best effort.    6. Connection   Your adjustment to CPAP will be easier if you are able to connect with others who use the same treatment. Ask your sleep doctor if there is a support group in your area for people who have sleep apnea, or look for one on the Internet.    7. Comfort   Increase your level of comfort by using a saline spray, decongestant or heated humidifier if CPAP irritates your nose, mouth or throat. Use your unit's \"ramp\" setting to slowly get used to the air pressure level. There may be soft pads you can buy that will fit over your mask straps. Look on www.CPAP.com for accessories such as these straps, a pillow contoured for side-sleeping with CPAP, longer hoses, hose covers to reduce condensation, or stands to keep the hose out of your way.                                 8. Cleaning   Clean your mask, tubing and headgear on a regular basis. Put this time in your schedule so that you don't forget to do it. Check and replace the filters for your CPAP unit and humidifier.    9. Completion   Although you are never finished with CPAP therapy, you should reward yourself by celebrating the completion of your first month of treatment. Expect this first month to be your hardest period of adjustment. It will " involve some trial and error as you find the machine, mask and pressure settings that are right for you.    Continuation  After your first month of treatment, continue to make a daily commitment to use your CPAP all night, every night and for every nap.    CPAP-Tips to starting with success:  Begin using your CPAP for short periods of time during the day while you watch TV or read.    Use CPAP every night and for every nap. Using it less often reduces the health benefits and makes it harder for your body to get used to it.    Newer CPAP models are virtually silent; however, if you find the sound of your CPAP machine to be bothersome, place the unit under your bed to dampen the sound.     Make small adjustments to your mask, tubing, straps and headgear until you get the right fit. Tightening the mask may actually worsen the leak.  If it leaves significant marks on your face or irritates the bridge of your nose, it may not be the best mask for you.  Speak with the person who supplied the mask and consider trying other masks.    Use a saline nasal spray to ease mild nasal congestion. Neti-Pot or saline nasal rinses may also help. Nasal gel sprays can help reduce nasal dryness.  Biotene mouthwash can be helpful to protect your teeth if you experience frequent dry mouth.  Dry mouth may be a sign of air escaping out of your mouth or out of the mask in the case of a full face mask.  Speak with your provider if you expect that is the case.     Take a nasal decongestant to relieve more severe nasal or sinus congestion.  Do not use Afrin (oxymetazoline) nasal spray more than 3 days in a row.  Speak with your sleep doctor if your nasal congestion is chronic.    Use a heated humidifier that fits your CPAP model to enhance your breathing comfort. Adjust the heat setting up if you get a dry nose or throat, down if you get condensation in the hose or mask.  Position the CPAP lower than you so that any condensation in the hose  drains back into the machine rather than towards the mask.    Try a system that uses nasal pillows if traditional masks give you problems.    Clean your mask, tubing and headgear once a week. Make sure the equipment dries fully.    Regularly check and replace the filters for your CPAP unit and humidifier.    Work closely with your sleep doctor and your CPAP supplier to make sure that you have the machine, mask and air pressure setting that works best for you.    BESIDES CPAP, WHAT OTHER THERAPIES ARE THERE?    Postioning devices if you only have the problem on your back    Dental devices if your condition is mild    Nasal valves may be effective though experience is limited    Tongue Retaining Device if missing teeth precludes the use of a dental device    Weight loss if you are overweight    Surgery in limited cases where devices are not acceptable or there are problems with structures in the nose and throat  If treated with one of these alternative options, further evaluation is necessary to ensure that the therapy is effective. This may require some form of testing.     Healthy Lifestyle:  Healthy diet, exercise and Limit alcohol: Not only will excessive alcohol increase your weight over time, but it irritates the throat tissues and make them swell, shrinking the airway and causing snoring. Drinking alcohol should be limited and stopped within 3-4 hours before going to bed.   Stop smoking: (Red swollen throat, heat, nicotine), also irritates and swells the airway, among numerous other negative health consequences.    Positioning Device  This example shows a pillow that straps around the waist. It may be appropriate for those whose sleep study shows milder sleep apnea that occurs primarily when lying flat on one's back. Preliminary studies have shown benefit but effectiveness at home should be verified.    Nasal Valves              Nasal valves may not be effective if you have frequent nasal congestion or have  difficulty breathing through your nose. They may be an option for mild apnea if other options are not well tolerated. The efficacy of these devices is generally less than CPAP or oral appliances.  Oral Appliance  These are examples of two of many custom-made devices that are more likely to work in mild sleep apnea  Oral appliances are dental mouth pieces that fit very much like a sports mouth guards or removable orthodontic retainers. They are used to treat snoring  And obstructive sleep apnea . The device prevents the airway from collapsing by either holding the tongue or supporting the jaw in a forward position. Since oral appliances are non-invasive and easy to use, they may be considered as an early treatment option. Oral appliance therapy (OAT) involves the customization, selection, fabrication, fitting, adjustments and long-term follow-up care of specially designed oral devices, worn during sleep, which reposition the lower jaw and tongue base forward to maintain an open airway.  Custom made oral appliances are proven to be more effective than over-the-counter devices. Therefore, the over-the-counter devices are recommended not to be used as a screening tool nor as a therapeutic option.  Who gets a dental device?  Oral appliance therapy can be used as an alternative to  CPAP therapy for the treatment of mild to moderate sleep apnea and for those patients who prefer OAT to CPAP. Oral appliance therapy is a first line therapy for the treatment of primary snoring. Additionally, OAT is an option for those that cannot tolerate CPAP as therapy or who have experienced insufficient surgical results.    Possible side effects?  Frequent but minor side effects include: excessive salivation, dry mouth, discomfort of teeth and jaw and temporary changes in the patient s bite.  Potential complications include: jaw pain, permanent occlusal changes and TMJ symptoms.  The above mentioned side effects and complications can be  recognized and managed by dentists trained in dental sleep medicine.    Finding a dentist that practices dental sleep medicine  Specific training is available through the American Academy of Dental Sleep Medicine for dentists interested in working in the field of sleep. To find a dentist who is educated in the field of sleep and the use of oral appliances, near you, visit the Web site of the American Academy of Dental Sleep Medicine; also see http://www.accpstorage.org/newOrganization/patients/oralAppliances.pdf   To search for a dentist certified in these practices:  Http://aadsm.org/FindADentist.aspx?1  Http://www.accpstorage.org/newOrganization/patients/oralAppliances.pdf    Tongue Retaining Device               Tongue Retaining Devices are devices that generally  suction cup  onto the tongue preventing it from falling back into the back of the throat during sleep.  They may be an option for people missing teeth, but can be uncomfortable. This particular device can be purchased online, but similar devices made by dentists fit more precisely and may be tolerated better. In general, they are rarely effective and often not very well tolerated.    Weight Loss:  Some patients may experience reduction or elimination of sleep apnea with weight loss.  Though there are significant health benefits from weight loss, long-term weight loss is very difficult to achieve- studies show success with dietary management in less that 10% of people.     If you are interested in dietary weight loss, you should review the options discussed at the National Institutes of Health patient information site:     Http:/www.health.nih.gov/topic/WeightLossDieting    Bariatric programs offer counseling in all methods of weight loss:    Http:/www.[facility].org/Specialties/WeightLossSurgeryandMedicalMgmt/htm    Surgery:  There are a number of surgeries that have been attempted to treat apnea. In general, surgical options are usually reserved  "for cases in which there is a physical abnormality contributing to obstruction or other treatment options are ineffective or not tolerated. Most surgical options are either unreliable or quite invasive. One of the more common procedures is:  Uvulopalatopharyngoplasty: In this procedure, the uvula (the finger-like tissue that hangs in the back of the throat), part of the soft palate (the tissue that the uvula is attached to), and sometimes the tonsils or adenoids are removed. The efficacy of this surgery is around 30-50% .  After surgery, complications may include:  Sleepiness and sleep apnea related to post-surgery medication   Swelling, infection and bleeding   A sore throat and/or difficulty swallowing   Drainage of secretions into the nose and a nasal quality to the voice. English language speech does not seem to be affected by this surgery.   Narrowing of the airway in the nose and throat (hence constricting breathing) snoring and even iatrogenically caused sleep apnea. By cutting the tissues, excess scar tissue can \"tighten\" the airway and make it even smaller than it was before UPPP.  Patients who have had the uvula removed will become unable to correctly speak [place] or any other language that has a uvular 'r' phoneme.    Surgeries to help resolve nasal congestion may help reduce the severity of apnea slightly. Nasal congestion does not cause apnea on its own, so these surgeries are usually not performed just for NOAH.  They may be worth considering if the nasal congestion is significantly bothersome independent of apnea.    "

## 2021-08-25 NOTE — PROGRESS NOTES
"Sharita Joseph is a 55 year old female being evaluated via a billable telephone visit.     \"This telephone visit will be conducted via a call between you and your physician/provider. We have found that certain health care needs can be provided without the need for an in-person visit or physical exam.  This service lets us provide the care you need with a telephone conversation.  If a prescription is necessary we can send it directly to your pharmacy.  If lab work is needed we can place an order for that and you can then stop by our lab to have the test done at a later time.\"    Telephone visits are billed at different rates depending on your insurance coverage.  Please reach out to your insurance provider with any questions.    Patient has given verbal consent for  a Telephone visit? Yes    What telephone number would you like your provider to contact at at:  [phone]    How would you like to obtain your AVS? MANJIT Dumont    Telephone Visit Details:     Telephone Visit Start Time: 11:01 AM    Telephone Visit End Time:11:29 AM    Does patient have any form of state insurance? No     Do you have wifi? Yes  Do you have a smart phone? Yes  Can you download an elise on your phone comfortably with out assistance? Yes  Can you watch a [employer] video? Yes      Bridgett Hobbs CMA      Sleep Consultation:    Date on this visit: 8/27/2021    Primary Physician: Wendy Calderón     Chief Complaint   Patient presents with     Fatigue     Sleep Problem     Waking up gasping, choking, and coughing       Sharita Joseph is a 55 year old female with medical history remarkable for nasal obstruction, depression and anxiety. Sharita  is sent by Wendy Calderón PA-C for a sleep consultation regarding snoring and fatigue.    Sharita goes to sleep at 10:00 PM during the week. She wakes up at 5:00 AM without an alarm. She falls asleep in 15 minutes.  Sharita denies difficulty falling asleep.  She wakes up 1 times a night for 5 " minutes before falling back to sleep.  Sharita wakes up to go to the bathroom.  On weekends, the sleep schedule is similar. Patient gets an average of 7 hours of sleep per night. She does not feel refreshed.     Patient does not use electronics in bed and watch TV in bed.     Sharita does do shift work, she works split shift.     Sharita does snore every night and snoring is loud. Patient does not have a regular bed partner. There is report of snoring, gasping and choking. She has no heartburn.  She does not have witnessed apneas. They frequently sleep separately.  Patient sleeps on her side. She has occasional morning headaches, denies symptoms of RLS. Sharita denies any sleep walking, sleep talking, dream enactment, sleep paralysis, cataplexy and hypnogogic/hypnopompic hallucinations. She has bruxism.      Sharita has difficulty breathing through her nose.  She is going to see a surgeon to fix nasal obstruction.    Sharita has gained 5 pounds in the last year.  Patient describes themself as a morning person.  She would prefer to go to sleep at 10:00 PM and wake up at 7:00 AM.  Patient's [place] Sleepiness score 3/24 inconsistent with severe daytime sleepiness.  She feels fatigue without energy, but also sleepy.     Sharita naps occasionally, but more often this summer. She takes some inadvertant naps.  She denies falling asleep while driving.  Patient was counseled on the importance of driving while alert, to pull over if drowsy, or nap before getting into the vehicle if sleepy.    Allergies:    Allergies   Allergen Reactions     Adhesive Tape      Dairy Digestive      Bloating, diarrhea, and pain     Gluten Meal      Gluten intolerance       Medications:    Current Outpatient Medications   Medication Sig Dispense Refill     azelastine (ASTELIN) 0.1 % nasal spray Spray 1-2 sprays into both nostrils 2 times daily 30 mL 3     Cholecalciferol (VITAMIN D3 PO) Take by mouth daily       conjugated estrogens (PREMARIN) cream Place 0.5  g vaginally twice a week 30 g 3     Magnesium 250 MG tablet Take 1 tablet by mouth daily. 100 tablet 3     sertraline (ZOLOFT) 100 MG tablet TAKE 1 TABLET BY MOUTH DAILY. NEED ANNUAL EXAM 90 tablet 3       Problem List:  Patient Active Problem List    Diagnosis Date Noted     FH: breast cancer 04/18/2014     Priority: Medium     Mother 46.        Dermal nevus 01/31/2013     Priority: Medium     Seborrheic keratosis 01/31/2013     Priority: Medium     Major depression in complete remission (H) 09/18/2012     Priority: Medium     Anxiety 02/23/2011     Priority: Medium     CARDIOVASCULAR SCREENING; LDL GOAL LESS THAN 160 10/31/2010     Priority: Medium     Chronic rhinitis 09/11/2007     Priority: Medium     Other chronic allergic conjunctivitis 05/02/2007     Priority: Medium     Nonspecific abnormal results of liver function study 04/28/2005     Priority: Medium     Elevated liver tests since fall 2002, Has been worked up by Int Med and Neurology and GI.  Intolerant to gluten , has had bx for celiac sprue which was negeative.  Blood test least specific positive more specific negative.  Offered to have lower endoscopy but pt refused.  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 Had ultasound in fall of 2002 which was negative. Seen by Dr. Hammonds in 2005.        Allergic rhinitis due to other allergen 04/28/2005     Priority: Medium     Allergies year round, testing positive for mold, dust and boxelder trees.  [facility]     Priority: Low     EMERGENCY CARE PLAN  Presenting Problem Signs and Symptoms Treatment Plan    Questions or conerns during clinic hours    I will call the clinic directly     Questions or conerns outside clinic hours    I will call the 24 hour nurse line at [phone]    Patient needs to schedule an appointment    I will call the 24 hour scheduling team at [phone] or clinic directly    Same day treatment      I will call the clinic first, nurse line if after hours, urgent care and express care if needed       DX V65.8 REPLACED WITH [id number] (2013)          Past Medical/Surgical History:  Past Medical History:   Diagnosis Date     Allergic rhinitis due to other allergen     Allergies year round, testing positive for mold, dust and boxelder trees.  Did 3years of shots     Depressive disorder      Depressive disorder, not elsewhere classified     Was Zoloft which stopped working, side effects to celexa, prozac sexual side effects.     DUB (dysfunctional uterine bleeding) 2013     Epidermoid cyst of skin 2013     Do you wish to do the replacement in the background? yes       Excessive or frequent menstruation 2008     Premenstrual tension syndrome 2006    Tried SSRI and OCPs Problem list name updated by automated process. Provider to review     Vaginal burning 2013     Past Surgical History:   Procedure Laterality Date     BIOPSY      D & C     C/SECTION, LOW TRANSVERSE      , Low Transverse     COLONOSCOPY       D & C       DILATION AND CURETTAGE, HYSTEROSCOPY DIAGNOSTIC, COMBINED  2014    Procedure: COMBINED DILATION AND CURETTAGE, HYSTEROSCOPY DIAGNOSTIC;  Surgeon: Cal Covington MD;  Location: [facility] OR     ENDOSCOPY       TONSILLECTOMY         Social History:  Social History     Socioeconomic History     Marital status:      Spouse name: Not on file     Number of children: 1     Years of education: Not on file     Highest education level: Not on file   Occupational History     Employer: [employer]   Tobacco Use     Smoking status: Former Smoker     Packs/day: 0.00     Years: 15.00     Pack years: 0.00     Types: Cigarettes     Start date: 1982     Quit date: 2000     Years since quittin.3     Smokeless tobacco: Never Used   Substance and Sexual Activity     Alcohol use: Yes     Comment: rare     Drug use: No     Sexual  activity: Yes     Partners: Female     Birth control/protection: None     Comment:  had vasectomy   Other Topics Concern     Parent/sibling w/ CABG, MI or angioplasty before 65F 55M? No   Social History Narrative     Not on file     Social Determinants of Health     Financial Resource Strain:      Difficulty of Paying Living Expenses:    Food Insecurity:      Worried About Running Out of Food in the Last Year:      Ran Out of Food in the Last Year:    Transportation Needs:      Lack of Transportation (Medical):      Lack of Transportation (Non-Medical):    Physical Activity:      Days of Exercise per Week:      Minutes of Exercise per Session:    Stress:      Feeling of Stress :    Social Connections:      Frequency of Communication with Friends and Family:      Frequency of Social Gatherings with Friends and Family:      Attends Amish Services:      Active Member of Clubs or Organizations:      Attends Club or Organization Meetings:      Marital Status:    Intimate Partner Violence:      Fear of Current or Ex-Partner:      Emotionally Abused:      Physically Abused:      Sexually Abused:        Family History:  Family History   Problem Relation Age of Onset     Osteoporosis Mother      Breast Cancer Mother         Dx over 20 years ago, age 46     Arthritis Mother      Psychotic Disorder Mother      Heart Disease Mother      Coronary Artery Disease Mother      Anxiety Disorder Mother      Lipids Father      Hypertension Father      Cerebrovascular Disease Father      Heart Disease Father      Dementia Father      Coronary Artery Disease Father      Hyperlipidemia Father      Obesity Father      Cancer Maternal Grandfather         lung, 25 years ago     Alzheimer Disease Paternal Grandmother         possible     Cancer - colorectal Paternal Grandfather         age of dx ?     Colon Cancer Paternal Grandfather      Obesity Brother      Hypertension Brother      Obesity Nephew      Obesity Niece       Melanoma No family hx of        Review of Systems:  Answers for HPI/ROS submitted by the patient on 8/25/2021  General Symptoms: Yes  Skin Symptoms: Yes  HENT Symptoms: No  EYE SYMPTOMS: Yes  HEART SYMPTOMS: No  LUNG SYMPTOMS: Yes  INTESTINAL SYMPTOMS: Yes  URINARY SYMPTOMS: No  GYNECOLOGIC SYMPTOMS: No  BREAST SYMPTOMS: No  SKELETAL SYMPTOMS: Yes  BLOOD SYMPTOMS: No  NERVOUS SYSTEM SYMPTOMS: No  MENTAL HEALTH SYMPTOMS: Yes  Fever: No  Loss of appetite: No  Weight loss: No  Weight gain: No  Fatigue: Yes  Night sweats: No  Chills: No  Increased stress: Yes  Excessive hunger: No  Excessive thirst: No  Feeling hot or cold when others believe the temperature is normal: No  Loss of height: No  Post-operative complications: No  Surgical site pain: No  Hallucinations: No  Change in or Loss of Energy: Yes  Hyperactivity: No  Confusion: No  Changes in hair: No  Changes in moles/birth marks: No  Itching: Yes  Rashes: Yes  Changes in nails: No  Acne: No  Hair in places you don't want it: No  Change in facial hair: No  Warts: No  Non-healing sores: No  Scarring: No  Flaking of skin: No  Color changes of hands/feet in cold : No  Sun sensitivity: No  Skin thickening: No  Eye pain: No  Vision loss: No  Dry eyes: Yes  Watery eyes: Yes  Eye bulging: No  Double vision: No  Flashing of lights: No  Spots: No  Floaters: No  Redness: No  Crossed eyes: No  Tunnel Vision: No  Yellowing of eyes: No  Eye irritation: Yes  Cough: No  Sputum or phlegm: No  Coughing up blood: No  Difficulty breating or shortness of breath: Yes  Snoring: Yes  Wheezing: No  Difficulty breathing on exertion: No  Nighttime Cough: Yes  Difficulty breathing when lying flat: Yes  Heart burn or indigestion: Yes  Nausea: No  Vomiting: No  Abdominal pain: Yes  Bloating: No  Constipation: Yes  Diarrhea: No  Blood in stool: No  Black stools: No  Rectal or Anal pain: No  Fecal incontinence: No  Yellowing of skin or eyes: No  Vomit with blood: No  Change in stools: No  Back  "pain: No  Muscle aches: Yes  Neck pain: No  Swollen joints: No  Joint pain: No  Bone pain: No  Muscle cramps: No  Muscle weakness: No  Joint stiffness: No  Bone fracture: No  Nervous or Anxious: No  Depression: No  Trouble sleeping: Yes  Trouble thinking or concentrating: No  Mood changes: Yes  Panic attacks: No      Physical Examination:  Vitals: Ht 1.74 m (5' 8.5\")   Wt 61.6 kg (135 lb 12.8 oz)   LMP 08/27/2014   BMI 20.35 kg/m    BMI= Body mass index is 20.35 kg/m .         [place] Total Score 8/25/2021   Total score - [place] 3       АЛЕКСАНДР Total Score: 16 (08/25/21 2121)    Last Comprehensive Metabolic Panel:  Sodium   Date Value Ref Range Status   07/01/2021 137 133 - 144 mmol/L Final     Potassium   Date Value Ref Range Status   07/01/2021 4.1 3.4 - 5.3 mmol/L Final     Chloride   Date Value Ref Range Status   07/01/2021 103 94 - 109 mmol/L Final     Carbon Dioxide   Date Value Ref Range Status   07/01/2021 31 20 - 32 mmol/L Final     Anion Gap   Date Value Ref Range Status   07/01/2021 3 3 - 14 mmol/L Final     Glucose   Date Value Ref Range Status   07/01/2021 86 70 - 99 mg/dL Final     Comment:     Non Fasting     Urea Nitrogen   Date Value Ref Range Status   07/01/2021 13 7 - 30 mg/dL Final     Creatinine   Date Value Ref Range Status   07/01/2021 0.89 0.52 - 1.04 mg/dL Final     GFR Estimate   Date Value Ref Range Status   07/01/2021 73 >60 mL/min/[1.73_m2] Final     Comment:     Non  GFR Calc  Starting 12/18/2018, serum creatinine based estimated GFR (eGFR) will be   calculated using the Chronic Kidney Disease Epidemiology Collaboration   (CKD-EPI) equation.       Calcium   Date Value Ref Range Status   07/01/2021 9.0 8.5 - 10.1 mg/dL Final     TSH   Date Value Ref Range Status   07/01/2021 0.94 0.40 - 4.00 mU/L Final         Impression:  Patient has features and risk factors for possible obstructive sleep apnea including: loud snoring, elevated bicarbonate of 31, gasping/choking, " non-refreshing sleep, bruxism, daytime fatigue/sleepiness and strong family history of NOAH. The STOP-BANG score is 3/8. The pathophysiology, diagnosis and treatment of NOAH was discussed.   Plan:    1. Polysomnogram (using 4% desaturation/Medicare/ AASM 1B scoring rules) for intermediate risk obstructive sleep apnea. Patient is a poor candidate for Home Sleep Testing due to not high probability of severe obstructive sleep apnea.  2. Advised her against drowsy driving.    3. Recommend weight management.     Literature provided regarding sleep apnea.      She will follow up with me in approximately two weeks after her sleep study has been competed to review the results and discuss plan of care.       Polysomnography reviewed.  Obstructive sleep apnea reviewed.  Complications of untreated sleep apnea were reviewed.    Tyree Alexis PA-C    CC: Wendy Calderón

## 2021-08-27 ENCOUNTER — VIRTUAL VISIT (OUTPATIENT)
Dept: SLEEP MEDICINE | Facility: CLINIC | Age: 56
End: 2021-08-27
Attending: PHYSICIAN ASSISTANT
Payer: COMMERCIAL

## 2021-08-27 DIAGNOSIS — R06.00 DYSPNEA AND RESPIRATORY ABNORMALITY: Primary | ICD-10-CM

## 2021-08-27 DIAGNOSIS — R06.89 DYSPNEA AND RESPIRATORY ABNORMALITY: Primary | ICD-10-CM

## 2021-08-27 DIAGNOSIS — R06.89 GASPING FOR BREATH: ICD-10-CM

## 2021-08-27 DIAGNOSIS — R53.83 OTHER FATIGUE: ICD-10-CM

## 2021-08-27 DIAGNOSIS — R53.83 MALAISE AND FATIGUE: ICD-10-CM

## 2021-08-27 DIAGNOSIS — R53.81 MALAISE AND FATIGUE: ICD-10-CM

## 2021-08-27 DIAGNOSIS — Z72.820 LACK OF ADEQUATE SLEEP: ICD-10-CM

## 2021-08-27 PROCEDURE — 99244 OFF/OP CNSLTJ NEW/EST MOD 40: CPT | Mod: 95 | Performed by: PHYSICIAN ASSISTANT

## 2021-08-27 ASSESSMENT — ENCOUNTER SYMPTOMS
SPUTUM PRODUCTION: 0
INSOMNIA: 1
SKIN CHANGES: 0
EYE IRRITATION: 1
NIGHT SWEATS: 0
NERVOUS/ANXIOUS: 0
WEIGHT GAIN: 0
BOWEL INCONTINENCE: 0
SHORTNESS OF BREATH: 1
NAUSEA: 0
HEARTBURN: 1
INCREASED ENERGY: 1
NAIL CHANGES: 0
DIARRHEA: 0
HALLUCINATIONS: 0
STIFFNESS: 0
HEMOPTYSIS: 0
ABDOMINAL PAIN: 1
EYE WATERING: 1
MUSCLE CRAMPS: 0
BACK PAIN: 0
SNORES LOUDLY: 1
DECREASED APPETITE: 0
COUGH DISTURBING SLEEP: 1
DECREASED CONCENTRATION: 0
POLYPHAGIA: 0
POLYDIPSIA: 0
CHILLS: 0
CONSTIPATION: 1
WHEEZING: 0
RECTAL PAIN: 0
FEVER: 0
JOINT SWELLING: 0
DOUBLE VISION: 0
POSTURAL DYSPNEA: 1
FATIGUE: 1
NECK PAIN: 0
ALTERED TEMPERATURE REGULATION: 0
EYE REDNESS: 0
ARTHRALGIAS: 0
VOMITING: 0
MUSCLE WEAKNESS: 0
PANIC: 0
POOR WOUND HEALING: 0
BLOOD IN STOOL: 0
DYSPNEA ON EXERTION: 0
COUGH: 0
WEIGHT LOSS: 0
JAUNDICE: 0
BLOATING: 0
DEPRESSION: 0
MYALGIAS: 1
EYE PAIN: 0

## 2021-09-15 ENCOUNTER — OFFICE VISIT (OUTPATIENT)
Dept: OTOLARYNGOLOGY | Facility: CLINIC | Age: 56
End: 2021-09-15
Attending: OTOLARYNGOLOGY
Payer: COMMERCIAL

## 2021-09-15 ENCOUNTER — PRE VISIT (OUTPATIENT)
Dept: OTOLARYNGOLOGY | Facility: CLINIC | Age: 56
End: 2021-09-15

## 2021-09-15 VITALS
HEART RATE: 67 BPM | TEMPERATURE: 99.6 F | HEIGHT: 69 IN | WEIGHT: 134 LBS | BODY MASS INDEX: 19.85 KG/M2 | OXYGEN SATURATION: 97 %

## 2021-09-15 DIAGNOSIS — J34.89 NASAL OBSTRUCTION: ICD-10-CM

## 2021-09-15 DIAGNOSIS — J34.2 DEVIATED NASAL SEPTUM: ICD-10-CM

## 2021-09-15 DIAGNOSIS — G47.30 SLEEP APNEA, UNSPECIFIED TYPE: Primary | ICD-10-CM

## 2021-09-15 DIAGNOSIS — J34.89 NASAL VALVE STENOSIS: ICD-10-CM

## 2021-09-15 PROCEDURE — 99203 OFFICE O/P NEW LOW 30 MIN: CPT | Performed by: OTOLARYNGOLOGY

## 2021-09-15 ASSESSMENT — MIFFLIN-ST. JEOR: SCORE: 1259.26

## 2021-09-15 ASSESSMENT — PAIN SCALES - GENERAL: PAINLEVEL: NO PAIN (0)

## 2021-09-15 NOTE — PROGRESS NOTES
Facial Plastic and Reconstructive Surgery Consultation    Referring Provider:   Carter Chavez MD  [address] [place] [address]    HPI:   I had the pleasure of seeing Sharita Joseph today in clinic for consultation for nasal obstruction.   Sharita Joseph is a 55 year old female with a history of significant fatigue who was evaluated and assessed to have nasal obstruction as a possible component. Of note, she did have an evaluation which deemed her to have signs of sleep disordered breathing and a sleep study was indicated. In fact, due to high possibility of NOAH a home sleep study was not advised.     She states she wakes up choking and also feels unrested. She has had significant stressors in her life in the recent years. She notes the right side is her worst breathing side. She pulls of her right cheek often to be able to breathe on that side. She also states she uses breathe right strips often.     She has struggled with nasal breathing for a long time. She has been treated for allergies and has had immunotherapy. She states however she was recently checked and cleared of any allergy diagnosis. She states she frequently used and sometimes uses benadryl to be able to sleep due to the obstruction. Dr. Chavez found a right anterior septal deviation and bilateral nasal valve collapse.       Review Of Systems  ROS: 10 point ROS neg other than the symptoms noted above in the HPI.    Patient Active Problem List   Diagnosis     Nonspecific abnormal results of liver function study     Allergic rhinitis due to other allergen     Other chronic allergic conjunctivitis     Chronic rhinitis     CARDIOVASCULAR SCREENING; LDL GOAL LESS THAN 160     Anxiety     Health Care Home     Major depression in complete remission (H)     Dermal nevus     Seborrheic keratosis     FH: breast cancer     Past Surgical History:   Procedure Laterality Date     BIOPSY      D & C     C/SECTION, LOW TRANSVERSE      ,  Low Transverse     COLONOSCOPY       D & C       DILATION AND CURETTAGE, HYSTEROSCOPY DIAGNOSTIC, COMBINED  2014    Procedure: COMBINED DILATION AND CURETTAGE, HYSTEROSCOPY DIAGNOSTIC;  Surgeon: Cal Covington MD;  Location: [facility]     ENDOSCOPY       TONSILLECTOMY       Current Outpatient Medications   Medication Sig Dispense Refill     azelastine (ASTELIN) 0.1 % nasal spray Spray 1-2 sprays into both nostrils 2 times daily 30 mL 3     Cholecalciferol (VITAMIN D3 PO) Take by mouth daily       conjugated estrogens (PREMARIN) cream Place 0.5 g vaginally twice a week 30 g 3     Magnesium 250 MG tablet Take 1 tablet by mouth daily. 100 tablet 3     sertraline (ZOLOFT) 100 MG tablet TAKE 1 TABLET BY MOUTH DAILY. NEED ANNUAL EXAM 90 tablet 3     Adhesive tape, Dairy digestive, and Gluten meal  Social History     Socioeconomic History     Marital status:      Spouse name: Not on file     Number of children: 1     Years of education: Not on file     Highest education level: Not on file   Occupational History     Employer: [employer]   Tobacco Use     Smoking status: Former Smoker     Packs/day: 0.00     Years: 15.00     Pack years: 0.00     Types: Cigarettes     Start date: 1982     Quit date: 2000     Years since quittin.3     Smokeless tobacco: Never Used   Substance and Sexual Activity     Alcohol use: Yes     Comment: rare     Drug use: No     Sexual activity: Yes     Partners: Female     Birth control/protection: None     Comment:  had vasectomy   Other Topics Concern     Parent/sibling w/ CABG, MI or angioplasty before 65F 55M? No   Social History Narrative     Not on file     Social Determinants of Health     Financial Resource Strain:      Difficulty of Paying Living Expenses:    Food Insecurity:      Worried About Running Out of Food in the Last Year:      Ran Out of Food in the Last Year:    Transportation Needs:      Lack of Transportation (Medical):      Lack of  Transportation (Non-Medical):    Physical Activity:      Days of Exercise per Week:      Minutes of Exercise per Session:    Stress:      Feeling of Stress :    Social Connections:      Frequency of Communication with Friends and Family:      Frequency of Social Gatherings with Friends and Family:      Attends Anabaptist Services:      Active Member of Clubs or Organizations:      Attends Club or Organization Meetings:      Marital Status:    Intimate Partner Violence:      Fear of Current or Ex-Partner:      Emotionally Abused:      Physically Abused:      Sexually Abused:      Family History   Problem Relation Age of Onset     Osteoporosis Mother      Breast Cancer Mother         Dx over 20 years ago, age 46     Arthritis Mother      Psychotic Disorder Mother      Heart Disease Mother      Coronary Artery Disease Mother      Anxiety Disorder Mother      Lipids Father      Hypertension Father      Cerebrovascular Disease Father      Heart Disease Father      Dementia Father      Coronary Artery Disease Father      Hyperlipidemia Father      Obesity Father      Sleep Apnea Father      Obesity Brother      Hypertension Brother      Sleep Apnea Brother      Cancer Maternal Grandfather         lung, 25 years ago     Alzheimer Disease Paternal Grandmother         possible     Cancer - colorectal Paternal Grandfather         age of dx ?     Colon Cancer Paternal Grandfather      Obesity Niece      Obesity Nephew      Melanoma No family hx of        PE:  Alert and Oriented, Answering Questions Appropriately  Atraumatic, Normocephalic, Face Symmetric  Skin: Pierre 2  Facial Nerve Intact and facial movement symmetric  EOM's, PEERL  Nasal Exam: No external Deformity, she has a tall narrow nose with collapsed midvault bilaterally, she has short medial crura with the caudal septum deviated to the right in the right nasal airway, anterior rhinoscopy shows the internal nasal valves are collapsed bilaterally, Domo  maneuver opens up both valves, the dorsal septum is slighlty deviated to the right but not severe, no mucopurulence or polyps, no masses  Neck: No lymphadenopathy, no thyromegaly, trachea midline  Chest: No wheezing, cyanosis, or stridor  Card: Normal upper extremity pulses and capillary refill, not diaphoretic  Neuro/Psych: CN's 2-12 intact, Moves all extremities, ambulation in intact, positive affect, no notable muscle weakness      IMPRESSION:    Nasal valve stenosis, internal   Septal deviation  Sleep disordered breathing       PLAN:    History concerning for NOAH, we ordered a sleep study today, this is important for the safe management of the patient around possible nasal surgery. This may be the source of her fatigue, but life stressors can contribute.    We discussed that nasal surgery would be to help with the air moving through the nose and not other symptoms she has been experiencing. We discussed that this is a quality of life surgery and thus it is dependent on her risk benefit assessment. We talked about surgery which would be an open septorhinoplasty with  grafts and bilateral batten grafts.     She will let us know is she would like to proceed. We gave her information today.     Photodocumentation was obtained.     I spent a total of 30 minutes in the care of patient Sharita Joseph during today's office visit. This time includes reviewing the patient's chart and prior history, obtaining a history, performing an examination and evaluation and counseling the patient. This time also includes ordering mediations or tests necessary in addition to communication to other member's of the patient's health care team. Time spent in documentation and care coordination is included.

## 2021-09-15 NOTE — LETTER
9/15/2021       RE: Sharita Joseph  [address]  [facility] [address]     Dear Colleague,    Thank you for referring your patient, Sharita Joseph, to the [facility] [place] at [facility]. Please see a copy of my visit note below.    Facial Plastic and Reconstructive Surgery Consultation    Referring Provider:   Carter Chavez MD  [address] [facility] [place] [address]    HPI:   I had the pleasure of seeing Sharita Joseph today in clinic for consultation for nasal obstruction.   Sharita Joseph is a 55 year old female with a history of significant fatigue who was evaluated and assessed to have nasal obstruction as a possible component. Of note, she did have an evaluation which deemed her to have signs of sleep disordered breathing and a sleep study was indicated. In fact, due to high possibility of NOAH a home sleep study was not advised.     She states she wakes up choking and also feels unrested. She has had significant stressors in her life in the recent years. She notes the right side is her worst breathing side. She pulls of her right cheek often to be able to breathe on that side. She also states she uses breathe right strips often.     She has struggled with nasal breathing for a long time. She has been treated for allergies and has had immunotherapy. She states however she was recently checked and cleared of any allergy diagnosis. She states she frequently used and sometimes uses benadryl to be able to sleep due to the obstruction. Dr. Chavez found a right anterior septal deviation and bilateral nasal valve collapse.       Review Of Systems  ROS: 10 point ROS neg other than the symptoms noted above in the HPI.    Patient Active Problem List   Diagnosis     Nonspecific abnormal results of liver function study     Allergic rhinitis due to other allergen     Other chronic allergic conjunctivitis     Chronic  rhinitis     CARDIOVASCULAR SCREENING; LDL GOAL LESS THAN 160     Anxiety     Health Care Home     Major depression in complete remission (H)     Dermal nevus     Seborrheic keratosis     FH: breast cancer     Past Surgical History:   Procedure Laterality Date     BIOPSY      D & C     C/SECTION, LOW TRANSVERSE      , Low Transverse     COLONOSCOPY       D & C       DILATION AND CURETTAGE, HYSTEROSCOPY DIAGNOSTIC, COMBINED  2014    Procedure: COMBINED DILATION AND CURETTAGE, HYSTEROSCOPY DIAGNOSTIC;  Surgeon: Cal Covington MD;  Location: [facility] OR     ENDOSCOPY       TONSILLECTOMY       Current Outpatient Medications   Medication Sig Dispense Refill     azelastine (ASTELIN) 0.1 % nasal spray Spray 1-2 sprays into both nostrils 2 times daily 30 mL 3     Cholecalciferol (VITAMIN D3 PO) Take by mouth daily       conjugated estrogens (PREMARIN) cream Place 0.5 g vaginally twice a week 30 g 3     Magnesium 250 MG tablet Take 1 tablet by mouth daily. 100 tablet 3     sertraline (ZOLOFT) 100 MG tablet TAKE 1 TABLET BY MOUTH DAILY. NEED ANNUAL EXAM 90 tablet 3     Adhesive tape, Dairy digestive, and Gluten meal  Social History     Socioeconomic History     Marital status:      Spouse name: Not on file     Number of children: 1     Years of education: Not on file     Highest education level: Not on file   Occupational History     Employer: [employer]   Tobacco Use     Smoking status: Former Smoker     Packs/day: 0.00     Years: 15.00     Pack years: 0.00     Types: Cigarettes     Start date: 1982     Quit date: 2000     Years since quittin.3     Smokeless tobacco: Never Used   Substance and Sexual Activity     Alcohol use: Yes     Comment: rare     Drug use: No     Sexual activity: Yes     Partners: Female     Birth control/protection: None     Comment:  had vasectomy   Other Topics Concern     Parent/sibling w/ CABG, MI or angioplasty before 65F 55M? No   Social History  Narrative     Not on file     Social Determinants of Health     Financial Resource Strain:      Difficulty of Paying Living Expenses:    Food Insecurity:      Worried About Running Out of Food in the Last Year:      Ran Out of Food in the Last Year:    Transportation Needs:      Lack of Transportation (Medical):      Lack of Transportation (Non-Medical):    Physical Activity:      Days of Exercise per Week:      Minutes of Exercise per Session:    Stress:      Feeling of Stress :    Social Connections:      Frequency of Communication with Friends and Family:      Frequency of Social Gatherings with Friends and Family:      Attends Jew Services:      Active Member of Clubs or Organizations:      Attends Club or Organization Meetings:      Marital Status:    Intimate Partner Violence:      Fear of Current or Ex-Partner:      Emotionally Abused:      Physically Abused:      Sexually Abused:      Family History   Problem Relation Age of Onset     Osteoporosis Mother      Breast Cancer Mother         Dx over 20 years ago, age 46     Arthritis Mother      Psychotic Disorder Mother      Heart Disease Mother      Coronary Artery Disease Mother      Anxiety Disorder Mother      Lipids Father      Hypertension Father      Cerebrovascular Disease Father      Heart Disease Father      Dementia Father      Coronary Artery Disease Father      Hyperlipidemia Father      Obesity Father      Sleep Apnea Father      Obesity Brother      Hypertension Brother      Sleep Apnea Brother      Cancer Maternal Grandfather         lung, 25 years ago     Alzheimer Disease Paternal Grandmother         possible     Cancer - colorectal Paternal Grandfather         age of dx ?     Colon Cancer Paternal Grandfather      Obesity Niece      Obesity Nephew      Melanoma No family hx of        PE:  Alert and Oriented, Answering Questions Appropriately  Atraumatic, Normocephalic, Face Symmetric  Skin: Pierre 2  Facial Nerve Intact and  facial movement symmetric  EOM's, PEERL  Nasal Exam: No external Deformity, she has a tall narrow nose with collapsed midvault bilaterally, she has short medial crura with the caudal septum deviated to the right in the right nasal airway, anterior rhinoscopy shows the internal nasal valves are collapsed bilaterally, McKean maneuver opens up both valves, the dorsal septum is slighlty deviated to the right but not severe, no mucopurulence or polyps, no masses  Neck: No lymphadenopathy, no thyromegaly, trachea midline  Chest: No wheezing, cyanosis, or stridor  Card: Normal upper extremity pulses and capillary refill, not diaphoretic  Neuro/Psych: CN's 2-12 intact, Moves all extremities, ambulation in intact, positive affect, no notable muscle weakness      IMPRESSION:    Nasal valve stenosis, internal   Septal deviation  Sleep disordered breathing       PLAN:    History concerning for NOAH, we ordered a sleep study today, this is important for the safe management of the patient around possible nasal surgery. This may be the source of her fatigue, but life stressors can contribute.    We discussed that nasal surgery would be to help with the air moving through the nose and not other symptoms she has been experiencing. We discussed that this is a quality of life surgery and thus it is dependent on her risk benefit assessment. We talked about surgery which would be an open septorhinoplasty with  grafts and bilateral batten grafts.     She will let us know is she would like to proceed. We gave her information today.     Photodocumentation was obtained.     I spent a total of 30 minutes in the care of patient Sharita Joseph during today's office visit. This time includes reviewing the patient's chart and prior history, obtaining a history, performing an examination and evaluation and counseling the patient. This time also includes ordering mediations or tests necessary in addition to communication to other member's  of the patient's health care team. Time spent in documentation and care coordination is included.     Again, thank you for allowing me to participate in the care of your patient.      Sincerely,    Mary Engel MD

## 2021-09-16 ENCOUNTER — TELEPHONE (OUTPATIENT)
Dept: FAMILY MEDICINE | Facility: CLINIC | Age: 56
End: 2021-09-16

## 2021-09-16 NOTE — TELEPHONE ENCOUNTER
Reason for Call: Appointment    Detailed comments: Pt would like to be seen tomorrow, she has been itching and scratching a lot. Scratching her skin off. Not sure if its hives. She has a Derm appt but that is not until 9/28 she's already waited a whole month for that appt.     Phone Number Patient can be reached at: Home number on file [phone] (home)    Best Time: anytime    Can we leave a detailed message on this number? YES    Call taken on 9/16/2021 at 1:22 PM by Svetlana Reveles

## 2021-09-16 NOTE — TELEPHONE ENCOUNTER
Called and spoke with patient, she is going out of town and will try an urgent care.    Carly Talley MA

## 2021-09-27 NOTE — NURSING NOTE
Photodocumentation obtained.    Referral placed for sleep study.    Pt will let us know if she'd like to pursue nasal surgery.    Dipti Valentine RN  9/27/2021 2:41 PM

## 2021-10-28 ENCOUNTER — MYC MEDICAL ADVICE (OUTPATIENT)
Dept: FAMILY MEDICINE | Facility: CLINIC | Age: 56
End: 2021-10-28

## 2021-11-11 ENCOUNTER — TELEPHONE (OUTPATIENT)
Dept: SLEEP MEDICINE | Facility: CLINIC | Age: 56
End: 2021-11-11
Payer: COMMERCIAL

## 2021-11-11 NOTE — TELEPHONE ENCOUNTER
Reason for call:  Other   Patient called regarding (reason for call): appointment  Additional comments: Patient is requesting a callback to assist in rescheduling her sleep study     Phone number to reach patient:  Cell number on file:    Telephone Information:   Mobile [phone]       Best Time:  Between 12-5pm    Can we leave a detailed message on this number?  YES    Travel screening: Negative

## 2022-01-01 ENCOUNTER — E-VISIT (OUTPATIENT)
Dept: FAMILY MEDICINE | Facility: CLINIC | Age: 57
End: 2022-01-01
Payer: COMMERCIAL

## 2022-01-01 DIAGNOSIS — Z20.822 CLOSE EXPOSURE TO 2019 NOVEL CORONAVIRUS: Primary | ICD-10-CM

## 2022-01-01 PROCEDURE — 99421 OL DIG E/M SVC 5-10 MIN: CPT | Performed by: FAMILY MEDICINE

## 2022-01-02 ENCOUNTER — LAB (OUTPATIENT)
Dept: FAMILY MEDICINE | Facility: CLINIC | Age: 57
End: 2022-01-02
Attending: FAMILY MEDICINE
Payer: COMMERCIAL

## 2022-01-02 DIAGNOSIS — Z20.822 CLOSE EXPOSURE TO 2019 NOVEL CORONAVIRUS: ICD-10-CM

## 2022-01-02 PROCEDURE — U0005 INFEC AGEN DETEC AMPLI PROBE: HCPCS

## 2022-01-02 PROCEDURE — U0003 INFECTIOUS AGENT DETECTION BY NUCLEIC ACID (DNA OR RNA); SEVERE ACUTE RESPIRATORY SYNDROME CORONAVIRUS 2 (SARS-COV-2) (CORONAVIRUS DISEASE [COVID-19]), AMPLIFIED PROBE TECHNIQUE, MAKING USE OF HIGH THROUGHPUT TECHNOLOGIES AS DESCRIBED BY CMS-2020-01-R: HCPCS

## 2022-01-02 NOTE — PATIENT INSTRUCTIONS
Dear Sharita,    Based on your exposure, we would like to test you for this virus. I have placed an order for this test. The best time for testing is 5-7 days after the exposure.    How to schedule:  Go to your [facility] home page and scroll down to the section that says  You have an appointment that needs to be scheduled  and click the large green button that says  Schedule Now  and follow the steps to find the next available opening.     If you are unable to complete these [employer] scheduling steps, please call [phone] to schedule your testing.     Monoclonal antibody treatment after exposure:  Because you have been exposed to COVID-19, you may be able to receive a treatment with monoclonal antibodies. This treatment can lower your risk of severe illness and going to the hospital. It is given through an IV or under your skin (subcutaneous) and must be given at an infusion center.   To be eligible, you must be 12 years or older, at least 88 pounds and:    Are not fully vaccinated against COVID-19, OR    Are immunocompromised     If you would like to sign up to be considered to receive the monoclonal antibody medicine, please complete a participation form through the [facility] here:  MNRAP ([email or web address]/diseases/[email or web address]/[email or web address]). You may also call the [facility] COVID-19 Public Hotline at 1-503.968.1226 (open Mon-Fri: 9am-7pm and Sat: 10am-6pm).     Not all people who are eligible will receive the medicine since supply is limited. You will be contacted in the next 1 to 2 business days only if you are selected. If you do not receive a call, you have not been selected to receive the medicine. If you have any questions about this medication, please contact your primary care provider. For more information, see [email or web address]/diseases/[email or web address]/[email or web address]    Return to work/school/ guidance:   Please let your workplace manager and staffing office know when  your quarantine ends. We cannot give you an exact date as it depends on the information below. You can calculate this on your own or work with your manager/staffing office to calculate this.    Quarantine Guidelines:  You are considered exposed if you have been within 6 feet of an infected person(s) for 15 minutes or more over a 24-hour period. Quarantine will start after the LAST time you had contact with the infected person while they were contagious (for example, if you saw someone on Monday and Wednesday, your quarantine would start after Wednesday).     If you have NO symptoms (asymptomatic):    Stay home for 14 days (quarantine) after your LAST contact with a person who has COVID-19 (this remains the CDC recommendation for greatest protection against spread of COVID-19), OR    10-day quarantine with no test, OR    Minimum 7-day quarantine with negative RT-PCR test collected on day 5 or later    Quarantine Guideline exceptions:    People who have been fully vaccinated do not need to quarantine unless they have symptoms. You are considered fully vaccinated 2 weeks after your 2nd dose in a 2-dose series or 2 weeks after a single-dose series. This includes vaccinated health care workers.  o Fully vaccinated people should still get tested 5-7 days after exposure, even if they don t have symptoms.   Note: If you have ongoing exposure to the COVID-positive person, this quarantine period may be more than 14 days. For example, if you continue to be exposed to your child who tested positive and cannot isolate from them, then the quarantine of 7-14 days can't start until your child is no longer contagious. This is typically 10 days from onset of the child's symptoms. So, the total duration may be 17-24 days in this case.   Please contact your doctor if you have questions or call the [facility] Public Hotline: 1-286.205.6930 (Mon-Fri: 9am-7pm and Sat: 10am-6pm).     How to Quarantine:     Monitor your symptoms until 14 days  after your last exposure. If you develop signs or symptoms, isolate and get tested (even if you have been tested already).    Stay home and away from others. Don't go to school or anywhere else. Generally, quarantine means staying home from work but there are some exceptions to this. Please contact your workplace. Cover your mouth and nose with a face covering if you must be around other people.     Wash your hands and face often. Use soap and water.    What are the symptoms of COVID-19?  The most common symptoms are cough, fever and trouble breathing. Less common symptoms include headache, body aches, fatigue (feeling very tired), chills, sore throat, stuffy or runny nose, diarrhea (loose poop), loss of taste or smell, belly pain, and nausea or vomiting (feeling sick to your stomach or throwing up).  If you develop symptoms, follow these guidelines:    If you're normally healthy: Please start another eVisit.    If you have a serious health problem (like cancer, heart failure, an organ transplant or kidney disease): Call your specialty clinic. Let them know that you might have COVID-19.    Where can I get more information?    [facility] - About COVID-19: [email or web address]     CDC - What to Do If You're Sick:     www.cdc.gov/coronavirus/2019-ncov/about/steps-when-sick.html    CDC - Ending Home Isolation:  https://www.cdc.gov/coronavirus/2019-ncov/your-health/quarantine-isolation.html    CDC - Caring for Someone:  www.cdc.gov/coronavirus/2019-ncov/if-you-are-sick/care-for-someone.html    [facility] clinical trials (COVID-19 research studies): clinicalaffairs.[facility]/umn-clinical-trials    Below are the COVID-19 hotlines at the [facility] ([facility]). Interpreters are available.  o For health questions: Call 485-392-3073 or 1-955.696.5419 (7 am to 7 pm)  o For questions about schools and childcare: Call 802-315-1339 or 1-551.461.8587 (7 a.m. to 7 p.m.)

## 2022-01-03 ENCOUNTER — OFFICE VISIT (OUTPATIENT)
Dept: FAMILY MEDICINE | Facility: CLINIC | Age: 57
End: 2022-01-03
Payer: COMMERCIAL

## 2022-01-03 VITALS
BODY MASS INDEX: 18.51 KG/M2 | HEART RATE: 73 BPM | WEIGHT: 125 LBS | SYSTOLIC BLOOD PRESSURE: 118 MMHG | DIASTOLIC BLOOD PRESSURE: 72 MMHG | HEIGHT: 69 IN

## 2022-01-03 DIAGNOSIS — L82.1 SEBORRHEIC KERATOSIS: ICD-10-CM

## 2022-01-03 DIAGNOSIS — L50.9 URTICARIA: Primary | ICD-10-CM

## 2022-01-03 DIAGNOSIS — F41.9 ANXIETY: Chronic | ICD-10-CM

## 2022-01-03 LAB — SARS-COV-2 RNA RESP QL NAA+PROBE: NEGATIVE

## 2022-01-03 PROCEDURE — 17110 DESTRUCTION B9 LES UP TO 14: CPT | Performed by: PHYSICIAN ASSISTANT

## 2022-01-03 PROCEDURE — 99213 OFFICE O/P EST LOW 20 MIN: CPT | Mod: 25 | Performed by: PHYSICIAN ASSISTANT

## 2022-01-03 RX ORDER — TRIAMCINOLONE ACETONIDE 5 MG/G
OINTMENT TOPICAL 2 TIMES DAILY
COMMUNITY
End: 2024-08-27

## 2022-01-03 RX ORDER — FLUOCINONIDE 0.5 MG/G
CREAM TOPICAL 2 TIMES DAILY
COMMUNITY
End: 2023-07-18

## 2022-01-03 RX ORDER — FLUOCINONIDE TOPICAL SOLUTION USP, 0.05% 0.5 MG/ML
SOLUTION TOPICAL 2 TIMES DAILY
COMMUNITY
End: 2023-07-18

## 2022-01-03 ASSESSMENT — PATIENT HEALTH QUESTIONNAIRE - PHQ9: SUM OF ALL RESPONSES TO PHQ QUESTIONS 1-9: 6

## 2022-01-03 ASSESSMENT — MIFFLIN-ST. JEOR: SCORE: 1213.44

## 2022-01-03 NOTE — PROGRESS NOTES
Assessment & Plan     Urticaria  Managed by derm. Did suggest trial of zyrtec instead of hydroxyzine to see if less sedating. 10mg every day to 10mg BID    Anxiety  Managed through therapy    Seborrheic keratosis  Treated with cryotherapy. return to clinic in 2 weeks if not fully resolved.   - DESTRUCT BENIGN LESION, UP TO 14                 No follow-ups on file.    GIL Villanueva [facility] LUIS Sheriff is a 56 year old who presents for the following health issues     HPI     Rash  Onset/Duration: x2-3 months  Description  Location: Back  Character: raised  Itching: mild  Intensity:  moderate  Progression of Symptoms:  same  Accompanying signs and symptoms:   Fever: no  Body aches or joint pain: no  Sore throat symptoms: no  Recent cold symptoms: no  History:           Previous episodes of similar rash: None  New exposures:  None  Recent travel: YES- [place]  Exposure to similar rash: YES  Precipitating or alleviating factors:   Therapies tried and outcome: none      Medication questions. Pt would like to stop the Prednisone because she said she doesn't want it to lower her immune system.    Has a pending COVID test- has no symptoms. Was around someone whom had symptoms. Did travel to [place]    Has been having stress that has been increasing.   Dyshidrotic eczema started in July.  Has had prednisone on and off for flares since then.   Started prednisone taper again on Saturday, but did some research and noted that prednisone can suppress the immune system and she is very concerned with this.   She has bruising and skin lesions on the buttocks from scratching.     She started on a 5 day taper of prednisone.   Has seen allergist and derm and they determined it is stress. Has 2 therapists she is working with.   Sees derm again in February.           Review of Systems   Constitutional, HEENT, cardiovascular, pulmonary, gi and gu systems are negative, except as otherwise noted.     "  Objective    /72 (BP Location: Right arm, Patient Position: Chair, Cuff Size: Adult Regular)   Pulse 73   Ht 1.74 m (5' 8.5\")   Wt 56.7 kg (125 lb)   LMP 08/27/2014   Breastfeeding No   BMI 18.73 kg/m    Body mass index is 18.73 kg/m .  Physical Exam   Skin: bruised excoriations noted on the posterior right back. No shaylee urticaria seen.   On the left mid back there is a slightly excoriated seborrheic keartoses.                 "

## 2022-01-03 NOTE — PATIENT INSTRUCTIONS
Take 30mg today  Then 20mg for 2 days   Then 10mg for 2 days then stop.       Zyrtec 10mg once or twice per day.   Safe with Benadryl or Hydroxyzine.

## 2022-01-04 ENCOUNTER — LAB (OUTPATIENT)
Dept: LAB | Facility: CLINIC | Age: 57
End: 2022-01-04
Payer: COMMERCIAL

## 2022-01-04 DIAGNOSIS — Z12.11 SCREENING FOR COLON CANCER: ICD-10-CM

## 2022-01-04 LAB — HEMOCCULT STL QL IA: NEGATIVE

## 2022-01-04 PROCEDURE — 82274 ASSAY TEST FOR BLOOD FECAL: CPT

## 2022-01-05 NOTE — RESULT ENCOUNTER NOTE
Your colon cancer screening test was negative. We will repeat this test in 1 year.   Wendy Calderón PA-C

## 2022-01-11 ENCOUNTER — E-VISIT (OUTPATIENT)
Dept: URGENT CARE | Facility: CLINIC | Age: 57
End: 2022-01-11
Payer: COMMERCIAL

## 2022-01-11 DIAGNOSIS — J06.9 VIRAL URI: Primary | ICD-10-CM

## 2022-01-11 PROCEDURE — 99207 PR NON-BILLABLE SERV PER CHARTING: CPT | Performed by: EMERGENCY MEDICINE

## 2022-01-11 NOTE — PATIENT INSTRUCTIONS
Dear Sharita Joseph,    Yes, you should be seen to have your ear examined.      We are sorry you are not feeling well. Based on the responses you provided, it is recommended that you be seen in-person in urgent care so we can better evaluate your symptoms. Please click here to find the nearest urgent care location to you.   You will not be charged for this Visit. Thank you for trusting us with your care.    Hugo Mahajan MD

## 2022-01-17 ENCOUNTER — MYC MEDICAL ADVICE (OUTPATIENT)
Dept: OTOLARYNGOLOGY | Facility: CLINIC | Age: 57
End: 2022-01-17
Payer: COMMERCIAL

## 2022-01-17 DIAGNOSIS — J34.89 NASAL VALVE STENOSIS: ICD-10-CM

## 2022-01-17 DIAGNOSIS — J34.829 NASAL VALVE COLLAPSE: ICD-10-CM

## 2022-01-17 DIAGNOSIS — J34.2 NASAL SEPTAL DEVIATION: ICD-10-CM

## 2022-01-17 DIAGNOSIS — J34.89 NASAL OBSTRUCTION: ICD-10-CM

## 2022-01-17 DIAGNOSIS — J34.2 DEVIATED NASAL SEPTUM: Primary | ICD-10-CM

## 2022-01-19 ENCOUNTER — TELEPHONE (OUTPATIENT)
Dept: SLEEP MEDICINE | Facility: CLINIC | Age: 57
End: 2022-01-19
Payer: COMMERCIAL

## 2022-01-19 NOTE — TELEPHONE ENCOUNTER
Reason for call:  Other   Patient called regarding (reason for call): appointment and call back  Additional comments: Patient is looking to schedule her sleeps tudy that was ordered by MANJIT Alexis    Phone number to reach patient:  Cell number on file:    Telephone Information:   Mobile [phone]       Best Time:  1-5pm    Can we leave a detailed message on this number?  YES    Travel screening: Negative

## 2022-01-20 DIAGNOSIS — J31.0 NONALLERGIC RHINITIS: ICD-10-CM

## 2022-01-24 RX ORDER — AZELASTINE 1 MG/ML
1-2 SPRAY, METERED NASAL 2 TIMES DAILY
Qty: 30 ML | Refills: 1 | Status: SHIPPED | OUTPATIENT
Start: 2022-01-24 | End: 2023-02-14

## 2022-01-24 NOTE — TELEPHONE ENCOUNTER
"Routing refill request to provider for review/approval because:  LOV: 03-18-21, Follow-up in 3 months, sooner if needed    Patient cancelled appointment on 1-.    Fiona BESS RN      Requested Prescriptions   Pending Prescriptions Disp Refills     azelastine (ASTELIN) 0.1 % nasal spray 30 mL 3     Sig: Spray 1-2 sprays into both nostrils 2 times daily       Antihistamines Protocol Passed - 1/21/2022  4:01 PM        Passed - Patient is 3-64 years of age     Apply weight-based dosing for peds patients age 3 - 12 years of age.    Forward request to provider for patients under the age of 3 or over the age of 64.          Passed - Recent (12 mo) or future (30 days) visit within the authorizing provider's specialty     Patient has had an office visit with the authorizing provider or a provider within the authorizing providers department within the previous 12 mos or has a future within next 30 days. See \"Patient Info\" tab in inbasket, or \"Choose Columns\" in Meds & Orders section of the refill encounter.              Passed - Medication is active on med list       Nasal Allergy Protocol Passed - 1/21/2022  4:01 PM        Passed - Patient is age 12 or older        Passed - Recent (12 mo) or future (30 days) visit within the authorizing provider's specialty     Patient has had an office visit with the authorizing provider or a provider within the authorizing providers department within the previous 12 mos or has a future within next 30 days. See \"Patient Info\" tab in inbasket, or \"Choose Columns\" in Meds & Orders section of the refill encounter.              Passed - Medication is active on med list             "

## 2022-02-16 NOTE — PROGRESS NOTES
History of Present Illness - Sharita Joseph is a very pleasant 56 year old female jhere to see me for the first time for ear pain.  Of note, she did see Dr Juan Gale in September of 2021 for nasal obstruction.    She is here to see me for a regular ear check.  She tells me that for many years she needs her ears cleared.  Especially the LEFT side is very narrow, and she tells me that she has a tendency to get otitis externa as well.     Most recently she was traveling and had to stay at a smoker's house, and that gave her a lot of allergic rhinitis and it gave her LEFT ear a lot of fullness. She tried some home remedies and that seemed to help the pain, but it is very stuffy on the LEFT stuff.     And just this past week she had some brief stabbing pains, which happens on occasion.    Otherwise no history of chronic ear disease in adulthood.  No previous ear surgery.  No history of chemo or radiation therapy to the head and neck, and no major head trauma.  He denies a history of  service, no frequent firearm use.  No previous history working in an industrial environment.      Past Medical History -   Patient Active Problem List   Diagnosis     Nonspecific abnormal results of liver function study     Allergic rhinitis due to other allergen     Other chronic allergic conjunctivitis     Chronic rhinitis     CARDIOVASCULAR SCREENING; LDL GOAL LESS THAN 160     Anxiety     Health Care Home     Major depression in complete remission (H)     Dermal nevus     Seborrheic keratosis     FH: breast cancer       Current Medications -   Current Outpatient Medications:      azelastine (ASTELIN) 0.1 % nasal spray, Spray 1-2 sprays into both nostrils 2 times daily, Disp: 30 mL, Rfl: 1     Cholecalciferol (VITAMIN D3 PO), Take by mouth daily, Disp: , Rfl:      conjugated estrogens (PREMARIN) cream, Place 0.5 g vaginally twice a week, Disp: 30 g, Rfl: 3     fluocinonide (LIDEX) 0.05 % external cream, Apply topically 2 times  daily, Disp: , Rfl:      fluocinonide (LIDEX) 0.05 % external solution, Apply topically 2 times daily, Disp: , Rfl:      Magnesium 250 MG tablet, Take 1 tablet by mouth daily., Disp: 100 tablet, Rfl: 3     PREDNISONE PO, Take 10 mg by mouth, Disp: , Rfl:      sertraline (ZOLOFT) 100 MG tablet, TAKE 1 TABLET BY MOUTH DAILY. NEED ANNUAL EXAM, Disp: 90 tablet, Rfl: 3     triamcinolone (KENALOG) 0.5 % external ointment, Apply topically 2 times daily, Disp: , Rfl:     Allergies -   Allergies   Allergen Reactions     Adhesive Tape      Dairy Digestive      Bloating, diarrhea, and pain     Gluten Meal      Gluten intolerance       Social History -   Social History     Socioeconomic History     Marital status:      Spouse name: Not on file     Number of children: 1     Years of education: Not on file     Highest education level: Not on file   Occupational History     Employer: [employer]   Tobacco Use     Smoking status: Former Smoker     Packs/day: 0.00     Years: 15.00     Pack years: 0.00     Types: Cigarettes     Start date: 1982     Quit date: 2000     Years since quittin.8     Smokeless tobacco: Never Used   Substance and Sexual Activity     Alcohol use: Yes     Comment: rare     Drug use: No     Sexual activity: Yes     Partners: Female     Birth control/protection: None     Comment:  had vasectomy   Other Topics Concern     Parent/sibling w/ CABG, MI or angioplasty before 65F 55M? No   Social History Narrative     Not on file     Social Determinants of Health     Financial Resource Strain: Not on file   Food Insecurity: Not on file   Transportation Needs: Not on file   Physical Activity: Not on file   Stress: Not on file   Social Connections: Not on file   Intimate Partner Violence: Not on file   Housing Stability: Not on file       Family History -   Family History   Problem Relation Age of Onset     Osteoporosis Mother      Breast Cancer Mother         Dx over 20 years ago, age 46      Arthritis Mother      Psychotic Disorder Mother      Heart Disease Mother      Coronary Artery Disease Mother      Anxiety Disorder Mother      Lipids Father      Hypertension Father      Cerebrovascular Disease Father      Heart Disease Father      Dementia Father      Coronary Artery Disease Father      Hyperlipidemia Father      Obesity Father      Sleep Apnea Father      Obesity Brother      Hypertension Brother      Sleep Apnea Brother      Cancer Maternal Grandfather         lung, 25 years ago     Alzheimer Disease Paternal Grandmother         possible     Cancer - colorectal Paternal Grandfather         age of dx ?     Colon Cancer Paternal Grandfather      Obesity Niece      Obesity Nephew      Melanoma No family hx of        Review of Systems - As per HPI and PMHx, otherwise 10+ system review of the head and neck, and general constitution is negative.    Physical Exam  LMP 2014     General - The patient is well nourished and well developed, and appears to have good nutritional status.  Alert and oriented to person and place, answers questions and cooperates with examination appropriately.   Head and Face - Normocephalic and atraumatic, with no gross asymmetry noted of the contour of the facial features.  The facial nerve is intact, with strong symmetric movements.  Voice and Breathing - The patient was breathing comfortably without the use of accessory muscles. There was no wheezing, stridor, or stertor.  The patients voice was clear and strong, and had appropriate pitch and quality.  Eyes - Extraocular movements intact, and the pupils were reactive to light.  Sclera were not icteric or injected, conjunctiva were pink and moist.    Cerumen Removal    Physical Exam and Procedure  Ears - On examination of the ears, I found that the LEFT  had cerumen impaction.  Therefore, I positioned them in the examination chair in a semi-supine position, beginning with the right side.  I used the binocular  surgical microscope to perform cerumen removal.  I began by using a cerumen loop to gently lift the edges of the cerumen mass away from the walls of the external canal.  Once I did this, I was able to suction away fragments of wax and debris using suction.  Once the mass was loose enough, the entire plug was pulled from the canal.  The tympanic membrane was intact, no sign of perforation or middle ear effusion.    I turned my attention to the contralateral side once again using the binocular surgical microscope and it was healthy. The tympanic membrane was intact, no sign of perforation or middle ear effusion.      Audiologic Studies - An audiogram and tympanogram were performed today as part of the evaluation and personally reviewed. The tympanogram shows a normal Type A curve, with normal canal volume and middle ear pressure.  There is no sign of eustachian tube dysfunction or middle ear effusion.  The audiogram was also normal.  The sensorineural hearing was age-appropriate, with no evidence of conductive hearing loss or significant asymmetry.        A/P - Sharita Joseph is a 56 year old female  (H92.03,  G89.29) Chronic ear pain, bilateral  (primary encounter diagnosis)  (H61.22) Impacted cerumen of left ear    I believe that her cerumen issue and ear pain might be different issues.    Based on today's history and physical exam, I can find no evidence of middle ear pathology or eustachian tube dysfunction.  At this point my primary diagnosis is of temporomandibular syndrome.  I have discussed the etiology of TMJ, and the reasons why referred pain can mimic symptoms of ear disease, headaches, and even sinusitis.  i have given the patient an instructional sheet of things to be tried at home, as well a referral to a TMJ specialist should it be needed.  Finally, I counseled the patient that should the therapy not help, or should the symptoms change, that they should return to me.    The patient has had their  cerumen procedurally removed today.  I have discussed ear care at home, including avoiding qtips or any other object placed in the canal.  I have also discussed that over the counter cerumen kits like Debrox or Cerumenex can be useful.    If no other issues, follow up with me in one year for an ear check.

## 2022-02-21 ENCOUNTER — OFFICE VISIT (OUTPATIENT)
Dept: AUDIOLOGY | Facility: CLINIC | Age: 57
End: 2022-02-21
Payer: COMMERCIAL

## 2022-02-21 ENCOUNTER — OFFICE VISIT (OUTPATIENT)
Dept: OTOLARYNGOLOGY | Facility: CLINIC | Age: 57
End: 2022-02-21
Payer: COMMERCIAL

## 2022-02-21 DIAGNOSIS — H61.22 IMPACTED CERUMEN OF LEFT EAR: ICD-10-CM

## 2022-02-21 DIAGNOSIS — G89.29 CHRONIC EAR PAIN, BILATERAL: Primary | ICD-10-CM

## 2022-02-21 DIAGNOSIS — H92.03 CHRONIC EAR PAIN, BILATERAL: Primary | ICD-10-CM

## 2022-02-21 DIAGNOSIS — H92.02 OTALGIA, LEFT: Primary | ICD-10-CM

## 2022-02-21 DIAGNOSIS — H93.13 TINNITUS OF BOTH EARS: ICD-10-CM

## 2022-02-21 PROCEDURE — 92504 EAR MICROSCOPY EXAMINATION: CPT | Performed by: OTOLARYNGOLOGY

## 2022-02-21 PROCEDURE — 99213 OFFICE O/P EST LOW 20 MIN: CPT | Mod: 25 | Performed by: OTOLARYNGOLOGY

## 2022-02-21 PROCEDURE — 92557 COMPREHENSIVE HEARING TEST: CPT

## 2022-02-21 PROCEDURE — 99207 PR NO CHARGE LOS: CPT

## 2022-02-21 PROCEDURE — 92550 TYMPANOMETRY & REFLEX THRESH: CPT

## 2022-02-21 NOTE — PROGRESS NOTES
AUDIOLOGY REPORT:    Patient was referred to [facility] from ENT by Dr. Mendoza for a hearing examination. Patient reports left ear pain and pressure. She notes longstanding history of cerumen impactions. She denies dizziness, history of noise exposure and drainage. Patient does report constant, bilateral tinnitus and ear infections as a child.     Testing:    Otoscopy:   Otoscopic exam indicates Left ear partially occluded with cerumen,riIght ear clear     Tympanograms:    RIGHT: normal eardrum mobility     LEFT:   normal eardrum mobility    Reflexes (reported by stimulus ear): 1000 Hz  RIGHT: Ipsilateral is present at normal levels  RIGHT: Contralateral is present at normal levels  LEFT:   Ipsilateral is present at normal levels  LEFT:   Contralateral is present at normal levels    Thresholds:   Pure Tone Thresholds assessed using convetnional audiometry with good  reliability from 250-8000 Hz bilaterally using insert earphones and circumaural headphones     RIGHT:  normal hearing sensitivity at frequencies tested     LEFT:    normal hearing sensitivity at frequencies tested    Speech Reception Threshold:    RIGHT: 5 dB HL    LEFT:   5 dB HL  Speech Reception Thresholds are in good agreement with pure tone thresholds    Word Recognition Score:     RIGHT: 100% at 50 dB HL using NU-6 recorded word list.    LEFT:   100% at 50 dB HL using NU-6 recorded word list.    Discussed results with the patient.     Patient was returned to ENT for follow up.     Ron Gallegos, CCC-A  Licensed Audiologist  [place] #[id number]    02/21/22

## 2022-02-21 NOTE — LETTER
2/21/2022         RE: Sharita Joseph  [address]  [place] [facility] [address]        Dear Colleague,    Thank you for referring your patient, Sharita Joseph, to the [facility]. Please see a copy of my visit note below.    History of Present Illness - Sharita Joseph is a very pleasant 56 year old female jhere to see me for the first time for ear pain.  Of note, she did see Dr Juan Gale in September of 2021 for nasal obstruction.    She is here to see me for a regular ear check.  She tells me that for many years she needs her ears cleared.  Especially the LEFT side is very narrow, and she tells me that she has a tendency to get otitis externa as well.     Most recently she was traveling and had to stay at a smoker's house, and that gave her a lot of allergic rhinitis and it gave her LEFT ear a lot of fullness. She tried some home remedies and that seemed to help the pain, but it is very stuffy on the LEFT stuff.     And just this past week she had some brief stabbing pains, which happens on occasion.    Otherwise no history of chronic ear disease in adulthood.  No previous ear surgery.  No history of chemo or radiation therapy to the head and neck, and no major head trauma.  He denies a history of  service, no frequent firearm use.  No previous history working in an industrial environment.      Past Medical History -   Patient Active Problem List   Diagnosis     Nonspecific abnormal results of liver function study     Allergic rhinitis due to other allergen     Other chronic allergic conjunctivitis     Chronic rhinitis     CARDIOVASCULAR SCREENING; LDL GOAL LESS THAN 160     Anxiety     Health Care Home     Major depression in complete remission (H)     Dermal nevus     Seborrheic keratosis     FH: breast cancer       Current Medications -   Current Outpatient Medications:      azelastine (ASTELIN) 0.1 % nasal spray, Spray 1-2 sprays into both nostrils 2 times daily, Disp: 30  mL, Rfl: 1     Cholecalciferol (VITAMIN D3 PO), Take by mouth daily, Disp: , Rfl:      conjugated estrogens (PREMARIN) cream, Place 0.5 g vaginally twice a week, Disp: 30 g, Rfl: 3     fluocinonide (LIDEX) 0.05 % external cream, Apply topically 2 times daily, Disp: , Rfl:      fluocinonide (LIDEX) 0.05 % external solution, Apply topically 2 times daily, Disp: , Rfl:      Magnesium 250 MG tablet, Take 1 tablet by mouth daily., Disp: 100 tablet, Rfl: 3     PREDNISONE PO, Take 10 mg by mouth, Disp: , Rfl:      sertraline (ZOLOFT) 100 MG tablet, TAKE 1 TABLET BY MOUTH DAILY. NEED ANNUAL EXAM, Disp: 90 tablet, Rfl: 3     triamcinolone (KENALOG) 0.5 % external ointment, Apply topically 2 times daily, Disp: , Rfl:     Allergies -   Allergies   Allergen Reactions     Adhesive Tape      Dairy Digestive      Bloating, diarrhea, and pain     Gluten Meal      Gluten intolerance       Social History -   Social History     Socioeconomic History     Marital status:      Spouse name: Not on file     Number of children: 1     Years of education: Not on file     Highest education level: Not on file   Occupational History     Employer: [employer]   Tobacco Use     Smoking status: Former Smoker     Packs/day: 0.00     Years: 15.00     Pack years: 0.00     Types: Cigarettes     Start date: 1982     Quit date: 2000     Years since quittin.8     Smokeless tobacco: Never Used   Substance and Sexual Activity     Alcohol use: Yes     Comment: rare     Drug use: No     Sexual activity: Yes     Partners: Female     Birth control/protection: None     Comment:  had vasectomy   Other Topics Concern     Parent/sibling w/ CABG, MI or angioplasty before 65F 55M? No   Social History Narrative     Not on file     Social Determinants of Health     Financial Resource Strain: Not on file   Food Insecurity: Not on file   Transportation Needs: Not on file   Physical Activity: Not on file   Stress: Not on file   Social Connections:  Not on file   Intimate Partner Violence: Not on file   Housing Stability: Not on file       Family History -   Family History   Problem Relation Age of Onset     Osteoporosis Mother      Breast Cancer Mother         Dx over 20 years ago, age 46     Arthritis Mother      Psychotic Disorder Mother      Heart Disease Mother      Coronary Artery Disease Mother      Anxiety Disorder Mother      Lipids Father      Hypertension Father      Cerebrovascular Disease Father      Heart Disease Father      Dementia Father      Coronary Artery Disease Father      Hyperlipidemia Father      Obesity Father      Sleep Apnea Father      Obesity Brother      Hypertension Brother      Sleep Apnea Brother      Cancer Maternal Grandfather         lung, 25 years ago     Alzheimer Disease Paternal Grandmother         possible     Cancer - colorectal Paternal Grandfather         age of dx ?     Colon Cancer Paternal Grandfather      Obesity Niece      Obesity Nephew      Melanoma No family hx of        Review of Systems - As per HPI and PMHx, otherwise 10+ system review of the head and neck, and general constitution is negative.    Physical Exam  LMP 2014     General - The patient is well nourished and well developed, and appears to have good nutritional status.  Alert and oriented to person and place, answers questions and cooperates with examination appropriately.   Head and Face - Normocephalic and atraumatic, with no gross asymmetry noted of the contour of the facial features.  The facial nerve is intact, with strong symmetric movements.  Voice and Breathing - The patient was breathing comfortably without the use of accessory muscles. There was no wheezing, stridor, or stertor.  The patients voice was clear and strong, and had appropriate pitch and quality.  Eyes - Extraocular movements intact, and the pupils were reactive to light.  Sclera were not icteric or injected, conjunctiva were pink and moist.    Cerumen  Removal    Physical Exam and Procedure  Ears - On examination of the ears, I found that the LEFT  had cerumen impaction.  Therefore, I positioned them in the examination chair in a semi-supine position, beginning with the right side.  I used the binocular surgical microscope to perform cerumen removal.  I began by using a cerumen loop to gently lift the edges of the cerumen mass away from the walls of the external canal.  Once I did this, I was able to suction away fragments of wax and debris using suction.  Once the mass was loose enough, the entire plug was pulled from the canal.  The tympanic membrane was intact, no sign of perforation or middle ear effusion.    I turned my attention to the contralateral side once again using the binocular surgical microscope and it was healthy. The tympanic membrane was intact, no sign of perforation or middle ear effusion.      Audiologic Studies - An audiogram and tympanogram were performed today as part of the evaluation and personally reviewed. The tympanogram shows a normal Type A curve, with normal canal volume and middle ear pressure.  There is no sign of eustachian tube dysfunction or middle ear effusion.  The audiogram was also normal.  The sensorineural hearing was age-appropriate, with no evidence of conductive hearing loss or significant asymmetry.        A/P - Sharita Joseph is a 56 year old female  (H92.03,  G89.29) Chronic ear pain, bilateral  (primary encounter diagnosis)  (H61.22) Impacted cerumen of left ear    Based on today's history and physical exam, I can find no evidence of middle ear pathology or eustachian tube dysfunction.  At this point my primary diagnosis is of temporomandibular syndrome.  I have discussed the etiology of TMJ, and the reasons why referred pain can mimic symptoms of ear disease, headaches, and even sinusitis.  i have given the patient an instructional sheet of things to be tried at home, as well a referral to a TMJ specialist should  it be needed.  Finally, I counseled the patient that should the therapy not help, or should the symptoms change, that they should return to me.    The patient has had their cerumen procedurally removed today.  I have discussed ear care at home, including avoiding qtips or any other object placed in the canal.  I have also discussed that over the counter cerumen kits like Debrox or Cerumenex can be useful.    If no other issues, follow up with me in one year for an ear check.        Again, thank you for allowing me to participate in the care of your patient.        Sincerely,        Charlie Mendoza MD

## 2022-03-09 ENCOUNTER — VIRTUAL VISIT (OUTPATIENT)
Dept: OTOLARYNGOLOGY | Facility: CLINIC | Age: 57
End: 2022-03-09
Payer: COMMERCIAL

## 2022-03-09 VITALS — WEIGHT: 125 LBS | BODY MASS INDEX: 18.51 KG/M2 | HEIGHT: 69 IN

## 2022-03-09 DIAGNOSIS — J34.2 DEVIATED NASAL SEPTUM: ICD-10-CM

## 2022-03-09 DIAGNOSIS — J34.89 NASAL OBSTRUCTION: Primary | ICD-10-CM

## 2022-03-09 PROCEDURE — 99213 OFFICE O/P EST LOW 20 MIN: CPT | Mod: TEL | Performed by: OTOLARYNGOLOGY

## 2022-03-09 RX ORDER — PREDNISONE 10 MG/1
TABLET ORAL
COMMUNITY
Start: 2022-01-01 | End: 2023-07-18

## 2022-03-09 ASSESSMENT — PAIN SCALES - GENERAL: PAINLEVEL: NO PAIN (0)

## 2022-03-09 NOTE — PROGRESS NOTES
"Facial Plastic and Reconstructive Surgery      Sharita Joseph had a virtual visit with me today for discussion of her nasal surgery. I have recommended septorhinoplasty with  and Batten grafts. She is also scheduled to have her sleep study on 3/10/2022. Her initial presentation was for poor sleep and sleep disordered breathing, which led her to an appointment with me.     We had a telephone discussion today because her video was not working. She had specific questions about the surgery, and we discussed the approach. 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     We also discussed concerns about the change in appearance of her nose. She is worried she will look \"ugly\". We discussed that she does need increased width and she understands this. I discussed that it will be swollen for some months but that the goal is not to change her appearance dramatically.    Finally we discussed questions about the effects of general anesthesia on brain chemistry specifically in depression. She describes an effect on her memory when she had a procedure in the past. Interestingly, I have a family member who described the same. I discussed that there is some literature about anesthesia and memory effects but that I don't know of much literature specific to depression. I also did note that this is not my area of expertise and thus that I am not up to date on the literature.     I will call her after I see the sleep study result.      Telephone length of time: 20 minutes              "

## 2022-03-09 NOTE — NURSING NOTE
"Chief Complaint   Patient presents with     RECHECK     Follow up       Height 1.74 m (5' 8.5\"), weight 56.7 kg (125 lb), last menstrual period 08/27/2014, not currently breastfeeding.     Writer unable to reach patient through video visit. Provider will call patient instead.    Gato Davis, EMT  "

## 2022-03-09 NOTE — PROGRESS NOTES
Writer attempted to contact patient to offer a sooner appointment with Dr. Engel 3/9/22 from 11:20 AM to 10:40 AM. Writer left a detailed voicemail for the patient.    Gato Davis, EMT

## 2022-03-09 NOTE — PROGRESS NOTES
"Sharita is a 56 year old who is being evaluated via a billable video visit.      How would you like to obtain your AVS? MyChart  If the video visit is dropped, the invitation should be resent by: Text to cell phone: [phone]  Will anyone else be joining your video visit? No  {If patient encounters technical issues they should call [phone] :[id number]}    Video Start Time: {video visit start/end time for provider to select:152948}  Video-Visit Details    Type of service:  Video Visit    Video End Time:{video visit start/end time for provider to select:152948}    Originating Location (pt. Location): {video visit patient location:529776::\"Home\"}    Distant Location (provider location):  [facility] [place]     Platform used for Video Visit: {Virtual Visit Platforms:384913::\"Nutonian\"}    "

## 2022-03-09 NOTE — LETTER
"3/9/2022     RE: Sharita Joseph  [address]  [place] [facility] [address]     Dear Colleague,    Thank you for referring your patient, Sharita Joseph, to the [facility] [place] [facility]. Please see a copy of my visit note below.    Writer attempted to contact patient to offer a sooner appointment with Dr. Engel 3/9/22 from 11:20 AM to 10:40 AM. Writer left a detailed voicemail for the patient.  Gato Davis, EMT    Facial Plastic and Reconstructive Surgery  Sharita Joseph had a virtual visit with me today for discussion of her nasal surgery. I have recommended septorhinoplasty with  and Batten grafts. She is also scheduled to have her sleep study on 3/10/2022. Her initial presentation was for poor sleep and sleep disordered breathing, which led her to an appointment with me.     We had a telephone discussion today because her video was not working. She had specific questions about the surgery, and we discussed the approach. 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     We also discussed concerns about the change in appearance of her nose. She is worried she will look \"ugly\". We discussed that she does need increased width and she understands this. I discussed that it will be swollen for some months but that the goal is not to change her appearance dramatically.    Finally we discussed questions about the effects of general anesthesia on brain chemistry specifically in depression. She describes an effect on her memory when she had a procedure in the past. Interestingly, I have a family member who described the same. I discussed that there is some literature about anesthesia and memory effects but that I don't know of much literature specific to depression. I also did note that this is not my area of expertise and thus that " I am not up to date on the literature.     I will call her after I see the sleep study result.    Telephone length of time: 20 minutes    Mary Engel MD

## 2022-03-10 ENCOUNTER — TELEPHONE (OUTPATIENT)
Dept: SLEEP MEDICINE | Facility: CLINIC | Age: 57
End: 2022-03-10

## 2022-03-10 NOTE — TELEPHONE ENCOUNTER
Called pt to let her know that her sleep study that was scheduled for tonight needs to be cancelled due to staffing issues.  LVM for her to call back to reschedule

## 2022-06-15 ENCOUNTER — THERAPY VISIT (OUTPATIENT)
Dept: PHYSICAL THERAPY | Facility: CLINIC | Age: 57
End: 2022-06-15

## 2022-06-15 DIAGNOSIS — M26.609 TMJ (TEMPOROMANDIBULAR JOINT SYNDROME): ICD-10-CM

## 2022-06-15 DIAGNOSIS — H92.03 EAR PAIN, BILATERAL: ICD-10-CM

## 2022-06-15 PROCEDURE — 97112 NEUROMUSCULAR REEDUCATION: CPT | Mod: GP | Performed by: PHYSICAL THERAPIST

## 2022-06-15 PROCEDURE — 97110 THERAPEUTIC EXERCISES: CPT | Mod: GP | Performed by: PHYSICAL THERAPIST

## 2022-06-15 PROCEDURE — 97161 PT EVAL LOW COMPLEX 20 MIN: CPT | Mod: GP | Performed by: PHYSICAL THERAPIST

## 2022-06-15 PROCEDURE — 97140 MANUAL THERAPY 1/> REGIONS: CPT | Mod: GP | Performed by: PHYSICAL THERAPIST
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
## 2022-06-23 ENCOUNTER — THERAPY VISIT (OUTPATIENT)
Dept: PHYSICAL THERAPY | Facility: CLINIC | Age: 57
End: 2022-06-23
Payer: COMMERCIAL

## 2022-06-23 DIAGNOSIS — H92.03 EAR PAIN, BILATERAL: ICD-10-CM

## 2022-06-23 DIAGNOSIS — H92.03 CHRONIC EAR PAIN, BILATERAL: ICD-10-CM

## 2022-06-23 DIAGNOSIS — M26.609 TMJ (TEMPOROMANDIBULAR JOINT SYNDROME): Primary | ICD-10-CM

## 2022-06-23 DIAGNOSIS — G89.29 CHRONIC EAR PAIN, BILATERAL: ICD-10-CM

## 2022-06-23 PROCEDURE — 97112 NEUROMUSCULAR REEDUCATION: CPT | Mod: GP | Performed by: PHYSICAL THERAPIST

## 2022-06-23 PROCEDURE — 97140 MANUAL THERAPY 1/> REGIONS: CPT | Mod: GP | Performed by: PHYSICAL THERAPIST

## 2022-07-01 ENCOUNTER — THERAPY VISIT (OUTPATIENT)
Dept: PHYSICAL THERAPY | Facility: CLINIC | Age: 57
End: 2022-07-01
Payer: COMMERCIAL

## 2022-07-01 DIAGNOSIS — M26.609 TMJ (TEMPOROMANDIBULAR JOINT SYNDROME): Primary | ICD-10-CM

## 2022-07-01 DIAGNOSIS — H92.03 EAR PAIN, BILATERAL: ICD-10-CM

## 2022-07-01 PROCEDURE — 97140 MANUAL THERAPY 1/> REGIONS: CPT | Mod: GP | Performed by: PHYSICAL THERAPIST

## 2022-07-01 PROCEDURE — 97112 NEUROMUSCULAR REEDUCATION: CPT | Mod: GP | Performed by: PHYSICAL THERAPIST

## 2022-07-11 ENCOUNTER — THERAPY VISIT (OUTPATIENT)
Dept: PHYSICAL THERAPY | Facility: CLINIC | Age: 57
End: 2022-07-11
Payer: COMMERCIAL

## 2022-07-11 DIAGNOSIS — H92.03 EAR PAIN, BILATERAL: ICD-10-CM

## 2022-07-11 DIAGNOSIS — M26.609 TMJ (TEMPOROMANDIBULAR JOINT SYNDROME): Primary | ICD-10-CM

## 2022-07-11 PROCEDURE — 97140 MANUAL THERAPY 1/> REGIONS: CPT | Mod: GP | Performed by: PHYSICAL THERAPIST

## 2022-07-11 PROCEDURE — 97112 NEUROMUSCULAR REEDUCATION: CPT | Mod: GP | Performed by: PHYSICAL THERAPIST

## 2022-07-20 ENCOUNTER — TELEPHONE (OUTPATIENT)
Dept: FAMILY MEDICINE | Facility: CLINIC | Age: 57
End: 2022-07-20

## 2022-07-20 NOTE — TELEPHONE ENCOUNTER
Patient called to check on status of refill request. Please see 7/14/22 encounter. Pt notified refill sent.     Thanks,  EVELIA Oneill  [facility]      [Initial Consultation] : an initial consultation [FreeTextEntry2] : symptom management

## 2022-07-26 ENCOUNTER — ANCILLARY PROCEDURE (OUTPATIENT)
Dept: MAMMOGRAPHY | Facility: CLINIC | Age: 57
End: 2022-07-26
Attending: PHYSICIAN ASSISTANT
Payer: COMMERCIAL

## 2022-07-26 DIAGNOSIS — Z12.31 VISIT FOR SCREENING MAMMOGRAM: ICD-10-CM

## 2022-07-26 PROCEDURE — 77063 BREAST TOMOSYNTHESIS BI: CPT | Mod: TC | Performed by: RADIOLOGY

## 2022-07-26 PROCEDURE — 77067 SCR MAMMO BI INCL CAD: CPT | Mod: TC | Performed by: RADIOLOGY

## 2022-08-02 ENCOUNTER — THERAPY VISIT (OUTPATIENT)
Dept: SLEEP MEDICINE | Facility: CLINIC | Age: 57
End: 2022-08-02
Payer: COMMERCIAL

## 2022-08-02 DIAGNOSIS — R06.89 GASPING FOR BREATH: ICD-10-CM

## 2022-08-02 DIAGNOSIS — R06.00 DYSPNEA AND RESPIRATORY ABNORMALITY: ICD-10-CM

## 2022-08-02 DIAGNOSIS — R06.89 DYSPNEA AND RESPIRATORY ABNORMALITY: ICD-10-CM

## 2022-08-02 DIAGNOSIS — R53.83 MALAISE AND FATIGUE: ICD-10-CM

## 2022-08-02 DIAGNOSIS — R53.83 OTHER FATIGUE: ICD-10-CM

## 2022-08-02 DIAGNOSIS — R53.81 MALAISE AND FATIGUE: ICD-10-CM

## 2022-08-02 DIAGNOSIS — Z72.820 LACK OF ADEQUATE SLEEP: ICD-10-CM

## 2022-08-02 PROCEDURE — 95810 POLYSOM 6/> YRS 4/> PARAM: CPT | Performed by: INTERNAL MEDICINE

## 2022-08-03 NOTE — PATIENT INSTRUCTIONS
[facility]    1. Your sleep study will be reviewed by a sleep physician within the next few days.     2. Please follow up in the sleep clinic as scheduled, or, make an appointment with your sleep provider to be seen within two weeks to discuss the results of the sleep study.    3. If you have any questions or problems with your treatment plan, please contact your sleep clinic provider at [phone] to further manage your condition.    4. Please review your attached medication list, and, at your follow-up appointment advise your sleep clinic provider about any changes.    5. Go to [email or web address] for more information about your sleep problems.    Rianna Espinoza, RPSGT  August 3, 2022

## 2022-08-13 ASSESSMENT — SLEEP AND FATIGUE QUESTIONNAIRES
HOW LIKELY ARE YOU TO NOD OFF OR FALL ASLEEP WHILE SITTING INACTIVE IN A PUBLIC PLACE: WOULD NEVER DOZE
HOW LIKELY ARE YOU TO NOD OFF OR FALL ASLEEP WHILE LYING DOWN TO REST IN THE AFTERNOON WHEN CIRCUMSTANCES PERMIT: SLIGHT CHANCE OF DOZING
HOW LIKELY ARE YOU TO NOD OFF OR FALL ASLEEP WHILE SITTING AND READING: WOULD NEVER DOZE
HOW LIKELY ARE YOU TO NOD OFF OR FALL ASLEEP WHILE SITTING QUIETLY AFTER LUNCH WITHOUT ALCOHOL: WOULD NEVER DOZE
HOW LIKELY ARE YOU TO NOD OFF OR FALL ASLEEP WHILE WATCHING TV: WOULD NEVER DOZE
HOW LIKELY ARE YOU TO NOD OFF OR FALL ASLEEP WHEN YOU ARE A PASSENGER IN A CAR FOR AN HOUR WITHOUT A BREAK: MODERATE CHANCE OF DOZING
HOW LIKELY ARE YOU TO NOD OFF OR FALL ASLEEP IN A CAR, WHILE STOPPED FOR A FEW MINUTES IN TRAFFIC: WOULD NEVER DOZE
HOW LIKELY ARE YOU TO NOD OFF OR FALL ASLEEP WHILE SITTING AND TALKING TO SOMEONE: WOULD NEVER DOZE

## 2022-08-14 NOTE — PROCEDURES
" SLEEP STUDY INTERPRETATION  DIAGNOSTIC POLYSOMNOGRAPHY REPORT      Patient: YEIMI ELI  YOB: 1965  Study Date: 8/2/2022  MRN: [id number]  Referring Provider: -  Ordering Provider: Tyree Alexis    Indications for Polysomnography: The patient is a 56 year old Female who is 5' 8\" and weighs 135.0 lbs. Her BMI is 20.7, [place] sleepiness scale 3. A diagnostic polysomnogram was performed to evaluate for loud snoring, elevated bicarbonate of 31, gasping/choking, non-refreshing sleep, bruxism, daytime fatigue/sleepiness and strong family history of NOAH    Polysomnogram Data: A full night polysomnogram recorded the standard physiologic parameters including EEG, EOG, EMG, ECG, nasal and oral airflow. Respiratory parameters of chest and abdominal movements were recorded with respiratory inductance plethysmography. Oxygen saturation was recorded by pulse oximetry. Hypopnea scoring rule used: 1B 4%.    Sleep Architecture:   The total recording time of the polysomnogram was 420.7 minutes. The total sleep time was 355.5 minutes. Sleep latency was increased at 24.3 minutes without the use of a sleep aid. REM latency was 177.5 minutes. Arousal index was 21.4 arousals per hour. Sleep efficiency was normal at 84.5%. Wake after sleep onset was 40.5 minutes. The patient spent 7.3% of total sleep time in Stage N1, 52.0% in Stage N2, 23.5% in Stage N3, and 17.2% in REM. Time in REM supine was 24.0 minutes.    Respiration:     Events ? The polysomnogram revealed a presence of 0 obstructive, 0 central, and 0 mixed apneas resulting in an apnea index of 0 events per hour. There were 7 obstructive hypopneas and 0 central hypopneas resulting in an obstructive hypopnea index of 1.2 and central hypopnea index of 0 events per hour. The combined apnea/hypopnea index was 1.2 events per hour (central apnea/hypopnea index was 0 events per hour). The REM AHI was 0 events per hour. The supine AHI was 0.9 events per hour. The " RERA index was 0.7 events per hour.  The RDI was 1.9 events per hour.    Snoring - was reported as absent    Respiratory rate and pattern - was notable for normal respiratory rate and pattern.    Sustained Sleep Associated Hypoventilation - Transcutaneous carbon dioxide monitoring was not used, however significant hypoventilation was not suggested by oximetry     Sleep Associated Hypoxemia - (Greater than 5 minutes O2 sat at or below 88%) was not present. Baseline oxygen saturation was 95.3%. Lowest oxygen saturation was 89.0%. Time spent less than or equal to 88% was 0 minutes. Time spent less than or equal to 89% was 0.1 minutes.    Movement Activity:     Periodic Limb Activity - There were 50 PLMs during the entire study. The PLM index was 8.4 movements per hour. The PLM Arousal Index was 0 per hour.    REM EMG Activity - Excessive transient/sustained muscle activity was not present.    Nocturnal Behavior - Abnormal sleep related behaviors were not noted     Bruxism - None apparent.    Cardiac Summary:   The average pulse rate was 53.5 bpm. The minimum pulse rate was 44.0 bpm while the maximum pulse rate was 81.0 bpm.  Arrhythmias were not noted.      Assessment:     No evidence of obstructive sleep apnea      Recommendations:    Suggest optimizing sleep schedule and avoiding sleep deprivation.    Pharmacologic therapy should be used for management of restless legs syndrome only if present and clinically indicated and not based on the presence of periodic limb movements alone.    Diagnostic Codes:   Dyspnea and respiratory abnormality RO6.89     _____________________________________   Electronically Signed By: Jitendra Shell MD 8/14/22           Range(%) Time in range (min)   0.0 - 89.0 0.1   0.0 - 88.0 -         Stage Min(mm Hg) Max(mm Hg)   Wake 54.9 55.3   NREM(1+2+3) 54.9 55.3   REM 55.0 55.3       Range(mmHg) Time in range (min)   55.0 - 100.0 415.6   Excluded data <20.0 & >65.0 3.6

## 2022-08-15 ENCOUNTER — TELEPHONE (OUTPATIENT)
Dept: SLEEP MEDICINE | Facility: CLINIC | Age: 57
End: 2022-08-15

## 2022-08-15 LAB — SLPCOMP: NORMAL

## 2022-08-15 NOTE — TELEPHONE ENCOUNTER
"Sleep study cancelled and rescheduled. Per patient \"you guys cancelled on me like twice!\"  Patient states she doesn't a private room to do the visit. Patient informed there's no sooner appointment avail barbara. Patient reports she'll just used her headphone during the visit.   Patient was asked if she was going to be in the [facility] at the time of the visit she's reported yes.     Patient had no further questions.       Ambar Rae SAMANTHA  [facility]    "

## 2022-08-15 NOTE — TELEPHONE ENCOUNTER
Voicemail left for patient to call back for more clarification.       CARYN Brandt  [facility]

## 2022-08-15 NOTE — TELEPHONE ENCOUNTER
Reason for Call:  Other call back    Detailed comments: patient called upset today.  She had 3 schedules for sleep studies.  2 of them were cancelled.  The 3rd time sleep study completed due to provider not in.      Patient has a currently f//u appointment on Wednesday but its about the worst day for her with her employer and wants to keep this if she has to but would really like an other day in the next 2 weeks with Tyree SARAVIA at  SLEEP CLINIC.      Video visit text link request.  Please contact patient today.  Thank you.    Phone Number Patient can be reached at: Home number on file [phone] (home) or Cell number on file:    Telephone Information:   Mobile [phone]       Best Time: any    Can we leave a detailed message on this number? YES    Call taken on 8/15/2022 at 1:21 PM by Olena Galarza

## 2022-08-17 ENCOUNTER — VIRTUAL VISIT (OUTPATIENT)
Dept: SLEEP MEDICINE | Facility: CLINIC | Age: 57
End: 2022-08-17
Payer: COMMERCIAL

## 2022-08-17 VITALS — HEIGHT: 69 IN | WEIGHT: 125 LBS | BODY MASS INDEX: 18.51 KG/M2

## 2022-08-17 DIAGNOSIS — R06.00 DYSPNEA AND RESPIRATORY ABNORMALITY: Primary | ICD-10-CM

## 2022-08-17 DIAGNOSIS — R06.89 DYSPNEA AND RESPIRATORY ABNORMALITY: Primary | ICD-10-CM

## 2022-08-17 PROCEDURE — 99212 OFFICE O/P EST SF 10 MIN: CPT | Mod: 95 | Performed by: PHYSICIAN ASSISTANT

## 2022-08-17 RX ORDER — CLOBETASOL PROPIONATE 0.5 MG/ML
SOLUTION TOPICAL
COMMUNITY
Start: 2022-07-24 | End: 2024-08-27

## 2022-08-17 RX ORDER — MELOXICAM 15 MG/1
TABLET ORAL
COMMUNITY
Start: 2022-06-11 | End: 2024-08-27

## 2022-08-17 NOTE — PATIENT INSTRUCTIONS
Your Body mass index is 18.73 kg/m .  Weight management is a personal decision.  If you are interested in exploring weight loss strategies, the following discussion covers the approaches that may be successful. Body mass index (BMI) is one way to tell whether you are at a healthy weight, overweight, or obese. It measures your weight in relation to your height.  A BMI of 18.5 to 24.9 is in the healthy range. A person with a BMI of 25 to 29.9 is considered overweight, and someone with a BMI of 30 or greater is considered obese. More than two-thirds of American adults are considered overweight or obese.  Being overweight or obese increases the risk for further weight gain. Excess weight may lead to heart disease and diabetes.  Creating and following plans for healthy eating and physical activity may help you improve your health.  Weight control is part of healthy lifestyle and includes exercise, emotional health, and healthy eating habits. Careful eating habits lifelong are the mainstay of weight control. Though there are significant health benefits from weight loss, long-term weight loss with diet alone may be very difficult to achieve- studies show long-term success with dietary management in less than 10% of people. Attaining a healthy weight may be especially difficult to achieve in those with severe obesity. In some cases, medications, devices and surgical management might be considered.  What can you do?  If you are overweight or obese and are interested in methods for weight loss, you should discuss this with your provider.     Consider reducing daily calorie intake by 500 calories.     Keep a food journal.     Avoiding skipping meals, consider cutting portions instead.    Diet combined with exercise helps maintain muscle while optimizing fat loss. Strength training is particularly important for building and maintaining muscle mass. Exercise helps reduce stress, increase energy, and improves fitness.  Increasing exercise without diet control, however, may not burn enough calories to loose weight.       Start walking three days a week 10-20 minutes at a time    Work towards walking thirty minutes five days a week     Eventually, increase the speed of your walking for 1-2 minutes at time    In addition, we recommend that you review healthy lifestyles and methods for weight loss available through the National Institutes of Health patient information sites:  http://win.niddk.nih.gov/publications/index.htm    And look into health and wellness programs that may be available through your health insurance provider, employer, local community center, or rose club.

## 2022-08-17 NOTE — PROGRESS NOTES
Sharita is a 56 year old who is being evaluated via a billable video visit.      How would you like to obtain your AVS? MyChart  If the video visit is dropped, the invitation should be resent by: Text to cell phone: [phone]  Will anyone else be joining your video visit? No        Video-Visit Details    Video Start Time: 10:30 AM    Type of service:  Video Visit    Video End Time:10:37 AM    Originating Location (pt. Location): Home    Distant Location (provider location):  [facility]     Platform used for Video Visit: Virgie Gonzalez    Chief Complaint   Patient presents with     Results     Test results     Study Results       Sharita Joseph is a 56 year old female who returns to [facility] for review of sleep testing results. She presented with loud snoring, elevated bicarbonate of 31, gasping/choking, non-refreshing sleep, bruxism, daytime fatigue/sleepiness and strong family history of NOAH.     Polysomnogram as interpreted by Dr. Shell:  Sleep Architecture:   The total recording time of the polysomnogram was 420.7 minutes. The total sleep time was 355.5 minutes. Sleep latency was increased at 24.3 minutes without the use of a sleep aid. REM latency was 177.5 minutes. Arousal index was 21.4 arousals per hour. Sleep efficiency was normal at 84.5%. Wake after sleep onset was 40.5 minutes. The patient spent 7.3% of total sleep time in Stage N1, 52.0% in Stage N2, 23.5% in Stage N3, and 17.2% in REM. Time in REM supine was 24.0 minutes.     Respiration:     Events ? The polysomnogram revealed a presence of 0 obstructive, 0 central, and 0 mixed apneas resulting in an apnea index of 0 events per hour. There were 7 obstructive hypopneas and 0 central hypopneas resulting in an obstructive hypopnea index of 1.2 and central hypopnea index of 0 events per hour. The combined apnea/hypopnea index was 1.2 events per hour (central  apnea/hypopnea index was 0 events per hour). The REM AHI was 0 events per hour. The supine AHI was 0.9 events per hour. The RERA index was 0.7 events per hour.  The RDI was 1.9 events per hour.    Snoring - was reported as absent    Respiratory rate and pattern - was notable for normal respiratory rate and pattern.    Sustained Sleep Associated Hypoventilation - Transcutaneous carbon dioxide monitoring was not used, however significant hypoventilation was not suggested by oximetry     Sleep Associated Hypoxemia - (Greater than 5 minutes O2 sat at or below 88%) was not present. Baseline oxygen saturation was 95.3%. Lowest oxygen saturation was 89.0%. Time spent less than or equal to 88% was 0 minutes. Time spent less than or equal to 89% was 0.1 minutes.     Movement Activity:     Periodic Limb Activity - There were 50 PLMs during the entire study. The PLM index was 8.4 movements per hour. The PLM Arousal Index was 0 per hour.    REM EMG Activity - Excessive transient/sustained muscle activity was not present.    Nocturnal Behavior - Abnormal sleep related behaviors were not noted     Bruxism - None apparent.     Cardiac Summary:   The average pulse rate was 53.5 bpm. The minimum pulse rate was 44.0 bpm while the maximum pulse rate was 81.0 bpm.  Arrhythmias were not noted.     Results were reviewed in detail today with Sharita and a copy sent to her for her records.        Problem List:  Patient Active Problem List    Diagnosis Date Noted     TMJ (temporomandibular joint syndrome) 06/15/2022     Priority: Medium     Ear pain, bilateral 06/15/2022     Priority: Medium     FH: breast cancer 04/18/2014     Priority: Medium     Mother 46.        Dermal nevus 01/31/2013     Priority: Medium     Seborrheic keratosis 01/31/2013     Priority: Medium     Major depression in complete remission (H) 09/18/2012     Priority: Medium     Anxiety 02/23/2011     Priority: Medium     CARDIOVASCULAR SCREENING; LDL GOAL LESS THAN 160  "10/31/2010     Priority: Medium     Chronic rhinitis 09/11/2007     Priority: Medium     Other chronic allergic conjunctivitis 05/02/2007     Priority: Medium     Nonspecific abnormal results of liver function study 04/28/2005     Priority: Medium     Elevated liver tests since fall 2002, Has been worked up by Int Med and Neurology and GI.  Intolerant to gluten , has had bx for celiac sprue which was negeative.  Blood test least specific positive more specific negative.  Offered to have lower endoscopy but pt refused.  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 Had ultasound in fall of 2002 which was negative. Seen by Dr. Hammonds in 2005.        Allergic rhinitis due to other allergen 04/28/2005     Priority: Medium     Allergies year round, testing positive for mold, dust and boxelder trees.  Did 3years of shots       [facility] 06/20/2011     Priority: Low     EMERGENCY CARE PLAN  Presenting Problem Signs and Symptoms Treatment Plan    Questions or conerns during clinic hours    I will call the clinic directly     Questions or conerns outside clinic hours    I will call the 24 hour nurse line at [phone]    Patient needs to schedule an appointment    I will call the 24 hour scheduling team at [phone] or clinic directly    Same day treatment     I will call the clinic first, nurse line if after hours, urgent care and express care if needed       DX V65.8 REPLACED WITH [id number] [facility] HOME (04/08/2013)          Ht 1.74 m (5' 8.5\")   Wt 56.7 kg (125 lb)   LMP 08/27/2014   BMI 18.73 kg/m      Impression/Plan:  The patient's sleep disordered breathing did not reach a level of clinical significance with AHI of 1.2 and RDI 1.9 events per hour. This was a good study that included time spent in REM sleep.   optimizing sleep schedule and avoiding sleep deprivation recommended.     She will follow up with me as needed.      15 minutes spent on day of encounter " doing chart review,  history and exam, counseling, coordinating plan of care, documentation and further activities as noted above.      Tyree Alexis PA-C

## 2022-08-21 ENCOUNTER — HEALTH MAINTENANCE LETTER (OUTPATIENT)
Age: 57
End: 2022-08-21

## 2022-09-12 PROBLEM — H92.03 EAR PAIN, BILATERAL: Status: RESOLVED | Noted: 2022-06-15 | Resolved: 2022-09-12

## 2022-10-12 DIAGNOSIS — J34.89 NASAL VALVE STENOSIS: ICD-10-CM

## 2022-10-12 DIAGNOSIS — J34.2 DEVIATED NASAL SEPTUM: ICD-10-CM

## 2022-10-12 DIAGNOSIS — J34.89 NASAL OBSTRUCTION: Primary | ICD-10-CM

## 2022-10-12 DIAGNOSIS — J34.3 HYPERTROPHY OF INFERIOR NASAL TURBINATE: ICD-10-CM

## 2022-10-12 RX ORDER — CEFAZOLIN SODIUM 2 G/50ML
2 SOLUTION INTRAVENOUS
Status: CANCELLED | OUTPATIENT
Start: 2022-10-12

## 2022-10-12 RX ORDER — CEFAZOLIN SODIUM 2 G/50ML
2 SOLUTION INTRAVENOUS SEE ADMIN INSTRUCTIONS
Status: CANCELLED | OUTPATIENT
Start: 2022-10-12

## 2022-11-08 PROBLEM — M26.609 TMJ (TEMPOROMANDIBULAR JOINT SYNDROME): Status: RESOLVED | Noted: 2022-06-15 | Resolved: 2022-11-08

## 2022-11-21 ENCOUNTER — MYC MEDICAL ADVICE (OUTPATIENT)
Dept: OTOLARYNGOLOGY | Facility: CLINIC | Age: 57
End: 2022-11-21

## 2022-11-21 NOTE — TELEPHONE ENCOUNTER
Called patient to schedule Septorhinoplasty, Repair of Nasal Valve Stenosis, Bilateral Inferior Turbinate Reduction (N/A)  With Dr. Engel. She said she is busy and asked that we send her a mychart with our phone number so she can callback when she is ready    China Moffett, Surgery Scheduler 11/21/2022 at 12:21 PM

## 2023-02-10 ENCOUNTER — MYC MEDICAL ADVICE (OUTPATIENT)
Dept: FAMILY MEDICINE | Facility: CLINIC | Age: 58
End: 2023-02-10
Payer: COMMERCIAL

## 2023-02-10 DIAGNOSIS — J31.0 NONALLERGIC RHINITIS: ICD-10-CM

## 2023-02-14 RX ORDER — AZELASTINE 1 MG/ML
1-2 SPRAY, METERED NASAL 2 TIMES DAILY
Qty: 30 ML | Refills: 0 | Status: SHIPPED | OUTPATIENT
Start: 2023-02-14 | End: 2023-05-15

## 2023-02-14 NOTE — TELEPHONE ENCOUNTER
Rx filled x 1. Patient has not been seen in 2 years and will need a follow-up visit for additional refills. Alternatively she can follow-up and request refills through her PCP.   · Presented with worsening shortness of breath/dyspnea on exertion  Currently being worked up as outpatient by PCP/pulmonology/Oncology for small-cell lung cancer  Was scheduled to have IR guided biopsy as outpatient on 7/21 however was done inpatient on 7/21 with IR      · Patient is currently status post for repeat lung biopsy on 07/27 since the biopsy from several days ago resulted in necrotic tissue without viable tumor - target possibly left lung  · Eliquis resumed 7/28  · Status post right thoracocentesis follow-up on cytology  · Oncology following

## 2023-04-06 ENCOUNTER — APPOINTMENT (OUTPATIENT)
Dept: URBAN - METROPOLITAN AREA CLINIC 193 | Age: 58
Setting detail: DERMATOLOGY
End: 2023-04-10

## 2023-04-06 DIAGNOSIS — D485 NEOPLASM OF UNCERTAIN BEHAVIOR OF SKIN: ICD-10-CM

## 2023-04-06 DIAGNOSIS — D22 MELANOCYTIC NEVI: ICD-10-CM

## 2023-04-06 DIAGNOSIS — L81.4 OTHER MELANIN HYPERPIGMENTATION: ICD-10-CM

## 2023-04-06 DIAGNOSIS — L82.1 OTHER SEBORRHEIC KERATOSIS: ICD-10-CM

## 2023-04-06 DIAGNOSIS — L72.8 OTHER FOLLICULAR CYSTS OF THE SKIN AND SUBCUTANEOUS TISSUE: ICD-10-CM

## 2023-04-06 DIAGNOSIS — Z71.89 OTHER SPECIFIED COUNSELING: ICD-10-CM

## 2023-04-06 DIAGNOSIS — L57.8 OTHER SKIN CHANGES DUE TO CHRONIC EXPOSURE TO NONIONIZING RADIATION: ICD-10-CM

## 2023-04-06 DIAGNOSIS — L71.8 OTHER ROSACEA: ICD-10-CM

## 2023-04-06 PROBLEM — D22.61 MELANOCYTIC NEVI OF RIGHT UPPER LIMB, INCLUDING SHOULDER: Status: ACTIVE | Noted: 2023-04-06

## 2023-04-06 PROBLEM — D48.5 NEOPLASM OF UNCERTAIN BEHAVIOR OF SKIN: Status: ACTIVE | Noted: 2023-04-06

## 2023-04-06 PROCEDURE — 99204 OFFICE O/P NEW MOD 45 MIN: CPT | Mod: 25

## 2023-04-06 PROCEDURE — OTHER INCISION AND DRAINAGE: OTHER

## 2023-04-06 PROCEDURE — OTHER OBSERVATION AND MEASURE: OTHER

## 2023-04-06 PROCEDURE — OTHER COUNSELING: OTHER

## 2023-04-06 PROCEDURE — OTHER OBSERVATION: OTHER

## 2023-04-06 PROCEDURE — 10060 I&D ABSCESS SIMPLE/SINGLE: CPT

## 2023-04-06 PROCEDURE — OTHER PRESCRIPTION MEDICATION MANAGEMENT: OTHER

## 2023-04-06 PROCEDURE — OTHER MIPS QUALITY: OTHER

## 2023-04-06 ASSESSMENT — LOCATION SIMPLE DESCRIPTION DERM
LOCATION SIMPLE: RIGHT UPPER ARM
LOCATION SIMPLE: RIGHT EYELID
LOCATION SIMPLE: RIGHT CHEEK
LOCATION SIMPLE: CHEST
LOCATION SIMPLE: NOSE
LOCATION SIMPLE: LEFT CHEEK
LOCATION SIMPLE: RIGHT POSTERIOR UPPER ARM
LOCATION SIMPLE: LEFT PRETIBIAL REGION
LOCATION SIMPLE: LEFT ANTERIOR NECK

## 2023-04-06 ASSESSMENT — LOCATION ZONE DERM
LOCATION ZONE: TRUNK
LOCATION ZONE: EYELID
LOCATION ZONE: NECK
LOCATION ZONE: LEG
LOCATION ZONE: ARM
LOCATION ZONE: NOSE
LOCATION ZONE: FACE

## 2023-04-06 ASSESSMENT — LOCATION DETAILED DESCRIPTION DERM
LOCATION DETAILED: RIGHT DISTAL POSTERIOR UPPER ARM
LOCATION DETAILED: LEFT INFERIOR ANTERIOR NECK
LOCATION DETAILED: NASAL ROOT
LOCATION DETAILED: LEFT MEDIAL SUPERIOR CHEST
LOCATION DETAILED: LEFT PROXIMAL PRETIBIAL REGION
LOCATION DETAILED: RIGHT DISTAL LATERAL POSTERIOR UPPER ARM
LOCATION DETAILED: RIGHT MEDIAL CANTHUS
LOCATION DETAILED: LEFT INFERIOR CENTRAL MALAR CHEEK
LOCATION DETAILED: RIGHT ANTERIOR PROXIMAL UPPER ARM
LOCATION DETAILED: RIGHT CENTRAL MALAR CHEEK

## 2023-04-06 NOTE — PROCEDURE: INCISION AND DRAINAGE
Body Location Override (Optional - Billing Will Still Be Based On Selected Body Map Location If Applicable): R bridge of nose
Detail Level: Detailed
Lesion Type: Cyst
Method: sterile needle
Curette: No
Anesthesia Volume In Cc: 0.3
Size Of Lesion In Cm (Optional But May Be Required For Some Insurances): 0
Wound Care: Petrolatum
Dressing: dry sterile dressing
Epidermal Sutures: 4-0 Ethilon
Epidermal Closure: simple interrupted
Suture Text: The incision was partially closed with
Preparation Text: The area was prepped in the usual clean fashion.
Curette Text (Optional): After the contents were expressed a curette was used to partially remove the cyst wall.
Consent was obtained and risks were reviewed including but not limited to delayed wound healing, infection, need for multiple I and D's, and pain.
Render Postcare In Note?: Yes
Post-Care Instructions: I reviewed with the patient in detail post-care instructions. Patient should keep wound covered and call the office should any redness, pain, swelling or worsening occur.

## 2023-04-06 NOTE — HPI: EVALUATION OF SKIN LESION(S)
How Severe Are Your Spot(S)?: moderate
Have Your Spot(S) Been Treated In The Past?: has not been treated
Hpi Title: Evaluation of Skin Lesions
Additional History: Patient presents today for a tbse, no hx of skin cancer patient would like all moles to be evaluated.\\n\\nConcerns are as follow - new small cyst on the R side bridge of nose. Asymptotic \\n\\nRecovering from the Neurovirus and has small bumps under both buttocks. \\n\\nSmall spot in the corner of R eye. Asymptotic has had for x years \\n\\nRash on both arms and legs -asymptomatic, has had for x months\\n\\nSmall brown spot on R shin - asymptotic , has had for x years \\n\\nBirthmark on R forearm.\\n\\nDiscoloration on the forehead.
Family Member: Uncle

## 2023-04-06 NOTE — PROCEDURE: OBSERVATION
Body Location Override (Optional - Billing Will Still Be Based On Selected Body Map Location If Applicable): R jeanette
Detail Level: Detailed
Size Of Lesion: 5.5

## 2023-04-06 NOTE — PROCEDURE: PRESCRIPTION MEDICATION MANAGEMENT
Render In Strict Bullet Format?: No
Detail Level: Zone
Continue Regimen: Metronidazole cream, strivectin

## 2023-04-06 NOTE — PROCEDURE: COUNSELING
Detail Level: Generalized
Detail Level: Detailed
Patient Specific Counseling (Will Not Stick From Patient To Patient): Discussed Oracea pill 40mg 1xday
Patient Specific Counseling (Will Not Stick From Patient To Patient): Patient will return 6-8 weeks for a biopsy.

## 2023-05-12 ENCOUNTER — MYC MEDICAL ADVICE (OUTPATIENT)
Dept: FAMILY MEDICINE | Facility: CLINIC | Age: 58
End: 2023-05-12
Payer: COMMERCIAL

## 2023-05-12 DIAGNOSIS — F40.243 FEAR OF FLYING: Primary | ICD-10-CM

## 2023-05-12 DIAGNOSIS — F41.9 ANXIETY: ICD-10-CM

## 2023-05-12 DIAGNOSIS — J31.0 NONALLERGIC RHINITIS: ICD-10-CM

## 2023-05-12 NOTE — TELEPHONE ENCOUNTER
Pt last had clonazePAM (KLONOPIN) 0.5 MG tablet 1/14/19 Sig: TAKE 1 TABLET BY MOUTH NIGHTLY AS NEEDED FOR ANXIETY

## 2023-05-15 DIAGNOSIS — J31.0 NONALLERGIC RHINITIS: ICD-10-CM

## 2023-05-15 RX ORDER — CLONAZEPAM 0.5 MG/1
TABLET ORAL
Refills: 0 | OUTPATIENT
Start: 2023-05-15

## 2023-05-15 RX ORDER — AZELASTINE 1 MG/ML
SPRAY, METERED NASAL
Qty: 90 ML | OUTPATIENT
Start: 2023-05-15

## 2023-05-15 RX ORDER — AZELASTINE 1 MG/ML
1-2 SPRAY, METERED NASAL 2 TIMES DAILY
Qty: 30 ML | Refills: 0 | Status: SHIPPED | OUTPATIENT
Start: 2023-05-15 | End: 2023-07-18

## 2023-05-16 RX ORDER — CLONAZEPAM 0.5 MG/1
TABLET ORAL
Qty: 2 TABLET | Refills: 0 | Status: SHIPPED | OUTPATIENT
Start: 2023-05-16 | End: 2023-07-18

## 2023-06-06 ENCOUNTER — APPOINTMENT (OUTPATIENT)
Dept: URBAN - METROPOLITAN AREA CLINIC 193 | Age: 58
Setting detail: DERMATOLOGY
End: 2023-06-07

## 2023-06-06 DIAGNOSIS — D485 NEOPLASM OF UNCERTAIN BEHAVIOR OF SKIN: ICD-10-CM

## 2023-06-06 PROBLEM — D48.5 NEOPLASM OF UNCERTAIN BEHAVIOR OF SKIN: Status: ACTIVE | Noted: 2023-06-06

## 2023-06-06 PROCEDURE — OTHER COUNSELING: OTHER

## 2023-06-06 PROCEDURE — 11102 TANGNTL BX SKIN SINGLE LES: CPT

## 2023-06-06 PROCEDURE — OTHER BIOPSY BY SHAVE METHOD: OTHER

## 2023-06-06 ASSESSMENT — LOCATION SIMPLE DESCRIPTION DERM: LOCATION SIMPLE: NOSE

## 2023-06-06 ASSESSMENT — LOCATION ZONE DERM: LOCATION ZONE: NOSE

## 2023-06-06 ASSESSMENT — LOCATION DETAILED DESCRIPTION DERM: LOCATION DETAILED: RIGHT NASAL ROOT

## 2023-06-06 NOTE — PROCEDURE: BIOPSY BY SHAVE METHOD
Detail Level: Detailed
Depth Of Biopsy: dermis
Was A Bandage Applied: Yes
Size Of Lesion In Cm: 0.5
X Size Of Lesion In Cm: 0
Biopsy Type: H and E
Biopsy Method: Dermablade
Anesthesia Type: 0.5% lidocaine with 1:200,000 epinephrine and a 1:10 solution of 8.4% sodium bicarbonate
Anesthesia Volume In Cc (Will Not Render If 0): 0.3
Hemostasis: Drysol
Wound Care: Petrolatum
Dressing: bandage
Destruction After The Procedure: No
Type Of Destruction Used: Curettage
Curettage Text: The wound bed was treated with curettage after the biopsy was performed.
Cryotherapy Text: The wound bed was treated with cryotherapy after the biopsy was performed.
Electrodesiccation Text: The wound bed was treated with electrodesiccation after the biopsy was performed.
Electrodesiccation And Curettage Text: The wound bed was treated with electrodesiccation and curettage after the biopsy was performed.
Silver Nitrate Text: The wound bed was treated with silver nitrate after the biopsy was performed.
Lab: -[id number]
Consent: Written consent was obtained and risks were reviewed including but not limited to scarring, infection, bleeding, scabbing, incomplete removal, nerve damage and allergy to anesthesia.
Post-Care Instructions: I reviewed with the patient in detail post-care instructions.
Notification Instructions: Patient will be notified of biopsy results if further tx is needed. However, patient instructed to call the office or go onto the pt portal if they want their bx(s) result within 2 weeks.
Billing Type: Third-Party Bill
Information: Selecting Yes will display possible errors in your note based on the variables you have selected. This validation is only offered as a suggestion for you. PLEASE NOTE THAT THE VALIDATION TEXT WILL BE REMOVED WHEN YOU FINALIZE YOUR NOTE. IF YOU WANT TO FAX A PRELIMINARY NOTE YOU WILL NEED TO TOGGLE THIS TO 'NO' IF YOU DO NOT WANT IT IN YOUR FAXED NOTE.

## 2023-07-07 ENCOUNTER — ANCILLARY ORDERS (OUTPATIENT)
Dept: FAMILY MEDICINE | Facility: CLINIC | Age: 58
End: 2023-07-07

## 2023-07-07 DIAGNOSIS — Z12.31 VISIT FOR SCREENING MAMMOGRAM: ICD-10-CM

## 2023-07-12 ASSESSMENT — ENCOUNTER SYMPTOMS
DYSURIA: 0
WEAKNESS: 0
JOINT SWELLING: 0
DIARRHEA: 0
SHORTNESS OF BREATH: 0
DIZZINESS: 0
COUGH: 0
HEMATOCHEZIA: 0
NERVOUS/ANXIOUS: 0
CHILLS: 0
CONSTIPATION: 0
FEVER: 0
HEMATURIA: 0
EYE PAIN: 0
FREQUENCY: 0
NAUSEA: 0
HEADACHES: 0
HEARTBURN: 0
BREAST MASS: 0
ARTHRALGIAS: 0
SORE THROAT: 0
PARESTHESIAS: 0
ABDOMINAL PAIN: 0

## 2023-07-18 ENCOUNTER — ANCILLARY PROCEDURE (OUTPATIENT)
Dept: MAMMOGRAPHY | Facility: CLINIC | Age: 58
End: 2023-07-18
Attending: PHYSICIAN ASSISTANT
Payer: COMMERCIAL

## 2023-07-18 ENCOUNTER — OFFICE VISIT (OUTPATIENT)
Dept: FAMILY MEDICINE | Facility: CLINIC | Age: 58
End: 2023-07-18
Payer: COMMERCIAL

## 2023-07-18 ENCOUNTER — ANCILLARY ORDERS (OUTPATIENT)
Dept: FAMILY MEDICINE | Facility: CLINIC | Age: 58
End: 2023-07-18

## 2023-07-18 VITALS
WEIGHT: 126.4 LBS | SYSTOLIC BLOOD PRESSURE: 107 MMHG | TEMPERATURE: 98.2 F | BODY MASS INDEX: 18.72 KG/M2 | HEART RATE: 66 BPM | HEIGHT: 69 IN | DIASTOLIC BLOOD PRESSURE: 72 MMHG | OXYGEN SATURATION: 99 %

## 2023-07-18 DIAGNOSIS — Z00.00 ROUTINE GENERAL MEDICAL EXAMINATION AT A HEALTH CARE FACILITY: Primary | ICD-10-CM

## 2023-07-18 DIAGNOSIS — Z12.4 CERVICAL CANCER SCREENING: ICD-10-CM

## 2023-07-18 DIAGNOSIS — J31.0 NONALLERGIC RHINITIS: ICD-10-CM

## 2023-07-18 DIAGNOSIS — R11.2 DRUG-INDUCED NAUSEA AND VOMITING: ICD-10-CM

## 2023-07-18 DIAGNOSIS — T50.905A DRUG-INDUCED NAUSEA AND VOMITING: ICD-10-CM

## 2023-07-18 DIAGNOSIS — Z12.11 SCREEN FOR COLON CANCER: ICD-10-CM

## 2023-07-18 DIAGNOSIS — F32.5 MAJOR DEPRESSIVE DISORDER WITH SINGLE EPISODE, IN FULL REMISSION (H): ICD-10-CM

## 2023-07-18 DIAGNOSIS — Z12.31 VISIT FOR SCREENING MAMMOGRAM: ICD-10-CM

## 2023-07-18 LAB
ALBUMIN SERPL BCG-MCNC: 4.8 G/DL (ref 3.5–5.2)
ALP SERPL-CCNC: 76 U/L (ref 35–104)
ALT SERPL W P-5'-P-CCNC: 26 U/L (ref 0–50)
ANION GAP SERPL CALCULATED.3IONS-SCNC: 8 MMOL/L (ref 7–15)
AST SERPL W P-5'-P-CCNC: 39 U/L (ref 0–45)
BILIRUB SERPL-MCNC: 0.4 MG/DL
BUN SERPL-MCNC: 10.9 MG/DL (ref 6–20)
CALCIUM SERPL-MCNC: 9.8 MG/DL (ref 8.6–10)
CHLORIDE SERPL-SCNC: 100 MMOL/L (ref 98–107)
CHOLEST SERPL-MCNC: 242 MG/DL
CREAT SERPL-MCNC: 0.8 MG/DL (ref 0.51–0.95)
DEPRECATED HCO3 PLAS-SCNC: 28 MMOL/L (ref 22–29)
GFR SERPL CREATININE-BSD FRML MDRD: 85 ML/MIN/1.73M2
GLUCOSE SERPL-MCNC: 67 MG/DL (ref 70–99)
HDLC SERPL-MCNC: 105 MG/DL
LDLC SERPL CALC-MCNC: 121 MG/DL
NONHDLC SERPL-MCNC: 137 MG/DL
POTASSIUM SERPL-SCNC: 4.2 MMOL/L (ref 3.4–5.3)
PROT SERPL-MCNC: 7.1 G/DL (ref 6.4–8.3)
SODIUM SERPL-SCNC: 136 MMOL/L (ref 136–145)
TRIGL SERPL-MCNC: 79 MG/DL

## 2023-07-18 PROCEDURE — 90471 IMMUNIZATION ADMIN: CPT | Performed by: PHYSICIAN ASSISTANT

## 2023-07-18 PROCEDURE — 99396 PREV VISIT EST AGE 40-64: CPT | Mod: 25 | Performed by: PHYSICIAN ASSISTANT

## 2023-07-18 PROCEDURE — 99213 OFFICE O/P EST LOW 20 MIN: CPT | Mod: 25 | Performed by: PHYSICIAN ASSISTANT

## 2023-07-18 PROCEDURE — 90715 TDAP VACCINE 7 YRS/> IM: CPT | Performed by: PHYSICIAN ASSISTANT

## 2023-07-18 PROCEDURE — 80061 LIPID PANEL: CPT | Performed by: PHYSICIAN ASSISTANT

## 2023-07-18 PROCEDURE — G0145 SCR C/V CYTO,THINLAYER,RESCR: HCPCS | Performed by: PHYSICIAN ASSISTANT

## 2023-07-18 PROCEDURE — 80053 COMPREHEN METABOLIC PANEL: CPT | Performed by: PHYSICIAN ASSISTANT

## 2023-07-18 PROCEDURE — 36415 COLL VENOUS BLD VENIPUNCTURE: CPT | Performed by: PHYSICIAN ASSISTANT

## 2023-07-18 PROCEDURE — 87624 HPV HI-RISK TYP POOLED RSLT: CPT | Performed by: PHYSICIAN ASSISTANT

## 2023-07-18 PROCEDURE — 77063 BREAST TOMOSYNTHESIS BI: CPT | Mod: TC | Performed by: RADIOLOGY

## 2023-07-18 PROCEDURE — 77067 SCR MAMMO BI INCL CAD: CPT | Mod: TC | Performed by: RADIOLOGY

## 2023-07-18 RX ORDER — DOXYCYCLINE 100 MG/1
100 CAPSULE ORAL 2 TIMES DAILY
COMMUNITY
Start: 2023-07-15 | End: 2024-08-27

## 2023-07-18 RX ORDER — SERTRALINE HYDROCHLORIDE 100 MG/1
100 TABLET, FILM COATED ORAL DAILY
Qty: 90 TABLET | Refills: 3 | Status: SHIPPED | OUTPATIENT
Start: 2023-07-18 | End: 2024-04-29

## 2023-07-18 RX ORDER — ONDANSETRON 8 MG/1
4-8 TABLET, FILM COATED ORAL EVERY 8 HOURS PRN
Qty: 20 TABLET | Refills: 0 | Status: SHIPPED | OUTPATIENT
Start: 2023-07-18 | End: 2024-08-27

## 2023-07-18 RX ORDER — AZELASTINE 1 MG/ML
1-2 SPRAY, METERED NASAL 2 TIMES DAILY
Qty: 30 ML | Refills: 0 | Status: SHIPPED | OUTPATIENT
Start: 2023-07-18 | End: 2023-10-30

## 2023-07-18 ASSESSMENT — ENCOUNTER SYMPTOMS
NERVOUS/ANXIOUS: 0
ABDOMINAL PAIN: 0
BREAST MASS: 0
FEVER: 0
EYE PAIN: 0
HEADACHES: 0
PARESTHESIAS: 0
COUGH: 0
WEAKNESS: 0
SHORTNESS OF BREATH: 0
DIZZINESS: 0
DYSURIA: 0
SORE THROAT: 0
HEMATURIA: 0
HEMATOCHEZIA: 0
ARTHRALGIAS: 0
FREQUENCY: 0
NAUSEA: 0
CHILLS: 0
DIARRHEA: 0
JOINT SWELLING: 0
CONSTIPATION: 0
HEARTBURN: 0

## 2023-07-18 ASSESSMENT — PATIENT HEALTH QUESTIONNAIRE - PHQ9
SUM OF ALL RESPONSES TO PHQ QUESTIONS 1-9: 6
SUM OF ALL RESPONSES TO PHQ QUESTIONS 1-9: 6
10. IF YOU CHECKED OFF ANY PROBLEMS, HOW DIFFICULT HAVE THESE PROBLEMS MADE IT FOR YOU TO DO YOUR WORK, TAKE CARE OF THINGS AT HOME, OR GET ALONG WITH OTHER PEOPLE: SOMEWHAT DIFFICULT

## 2023-07-18 NOTE — NURSING NOTE
Prior to immunization administration, verified patients identity using patient s name and date of birth. Please see Immunization Activity for additional information.     Screening Questionnaire for Adult Immunization    Are you sick today?   No   Do you have allergies to medications, food, a vaccine component or latex?   Yes   Have you ever had a serious reaction after receiving a vaccination?   No   Do you have a long-term health problem with heart, lung, kidney, or metabolic disease (e.g., diabetes), asthma, a blood disorder, no spleen, complement component deficiency, a cochlear implant, or a spinal fluid leak?  Are you on long-term aspirin therapy?   No   Do you have cancer, leukemia, HIV/AIDS, or any other immune system problem?   No   Do you have a parent, brother, or sister with an immune system problem?   No   In the past 3 months, have you taken medications that affect  your immune system, such as prednisone, other steroids, or anticancer drugs; drugs for the treatment of rheumatoid arthritis, Crohn s disease, or psoriasis; or have you had radiation treatments?   No   Have you had a seizure, or a brain or other nervous system problem?   No   During the past year, have you received a transfusion of blood or blood    products, or been given immune (gamma) globulin or antiviral drug?   No   For women: Are you pregnant or is there a chance you could become       pregnant during the next month?   No   Have you received any vaccinations in the past 4 weeks?   No     Immunization questionnaire was positive for at least one answer.  Notified Wendy Calderón PA-C.      Patient instructed to remain in clinic for 15 minutes afterwards, and to report any adverse reactions.     Screening performed by Katherine Nuñez CMA on 7/18/2023 at 9:11 AM.    VIS for Tdap given on same date of administration.  Staff signature/Title: Katherine ZAMARRIPA MA

## 2023-07-18 NOTE — PROGRESS NOTES
SUBJECTIVE:   CC: Sharita is an 57 year old who presents for preventive health visit.       2023     8:50 AM   Additional Questions   Roomed by inez batista     Healthy Habits:     Getting at least 3 servings of Calcium per day:  Yes    Bi-annual eye exam:  Yes    Dental care twice a year:  Yes    Sleep apnea or symptoms of sleep apnea:  Daytime drowsiness    Diet:  Gluten-free/reduced and Other    Frequency of exercise:  6-7 days/week    Duration of exercise:  45-60 minutes    Taking medications regularly:  Yes    Medication side effects:  Not applicable    Additional concerns today:  Yes    Doxycyline is making her nauseated.     Today's PHQ-9 Score:       2023     8:44 AM   PHQ-9 SCORE   PHQ-9 Total Score MyChart 6 (Mild depression)   PHQ-9 Total Score 6         Social History     Tobacco Use     Smoking status: Former     Packs/day: 0.00     Years: 15.00     Pack years: 0.00     Types: Cigarettes     Start date: 1982     Quit date: 2000     Years since quittin.2     Smokeless tobacco: Never     Tobacco comments:     I smoked 3 cigarettes or fewer a day, but not during pregnancy.   Substance Use Topics     Alcohol use: Yes     Comment: once in a while in the summer             2023    12:48 PM   Alcohol Use   Prescreen: >3 drinks/day or >7 drinks/week? No     Reviewed orders with patient.  Reviewed health maintenance and updated orders accordingly - Yes  Lab work is in process    Breast Cancer Screening:    FHS-7:       2021     2:54 PM 2022     1:26 PM 2023    12:50 PM   Breast CA Risk Assessment (FHS-7)   Did any of your first-degree relatives have breast or ovarian cancer? Yes Yes Yes   Did any of your relatives have bilateral breast cancer? Unknown No Unknown   Did any man in your family have breast cancer? No No No   Did any woman in your family have breast and ovarian cancer? Yes No    Did any woman in your family have breast cancer before age 50 y? Yes Yes Yes   Do  you have 2 or more relatives with breast and/or ovarian cancer? No No No   Do you have 2 or more relatives with breast and/or bowel cancer? Yes No Yes     click delete button to remove this line now  Mammogram Screening: Recommended mammography every 1-2 years with patient discussion and risk factor consideration  Pertinent mammograms are reviewed under the imaging tab.    History of abnormal Pap smear: NO - age 30-65 PAP every 5 years with negative HPV co-testing recommended      Latest Ref Rng & Units 7/26/2018     1:51 PM 7/26/2018     1:45 PM 11/9/2015     7:26 PM   PAP / HPV   PAP (Historical)  NIL      HPV 16 DNA NEG^Negative  Negative  Negative    HPV 18 DNA NEG^Negative  Negative  Negative    Other HR HPV NEG^Negative  Negative  Negative      Reviewed and updated as needed this visit by clinical staff   Tobacco  Allergies  Meds  Problems  Med Hx  Surg Hx  Fam Hx          Reviewed and updated as needed this visit by Provider   Tobacco  Allergies  Meds  Problems  Med Hx  Surg Hx  Fam Hx             Review of Systems   Constitutional: Negative for chills and fever.   HENT: Negative for congestion, ear pain, hearing loss and sore throat.    Eyes: Negative for pain and visual disturbance.   Respiratory: Negative for cough and shortness of breath.    Cardiovascular: Negative for chest pain and peripheral edema.   Gastrointestinal: Negative for abdominal pain, constipation, diarrhea, heartburn, hematochezia and nausea.   Breasts:  Negative for tenderness, breast mass and discharge.   Genitourinary: Negative for dysuria, frequency, genital sores, hematuria, pelvic pain, urgency, vaginal bleeding and vaginal discharge.   Musculoskeletal: Negative for arthralgias and joint swelling.   Skin: Negative for rash.   Neurological: Negative for dizziness, weakness, headaches and paresthesias.   Psychiatric/Behavioral: Negative for mood changes. The patient is not nervous/anxious.           OBJECTIVE:   BP  "107/72 (BP Location: Left arm, Patient Position: Chair, Cuff Size: Adult Regular)   Pulse 66   Temp 98.2  F (36.8  C) (Oral)   Ht 1.74 m (5' 8.5\")   Wt 57.3 kg (126 lb 6.4 oz)   LMP 08/13/2014   SpO2 99%   BMI 18.94 kg/m    Physical Exam  GENERAL: healthy, alert and no distress  EYES: Eyes grossly normal to inspection, PERRL and conjunctivae and sclerae normal  HENT: ear canals and TM's normal, nose and mouth without ulcers or lesions  NECK: no adenopathy, no asymmetry, masses, or scars and thyroid normal to palpation  RESP: lungs clear to auscultation - no rales, rhonchi or wheezes  CV: regular rate and rhythm, normal S1 S2, no S3 or S4, no murmur, click or rub, no peripheral edema and peripheral pulses strong  ABDOMEN: soft, nontender, no hepatosplenomegaly, no masses and bowel sounds normal   (female): normal female external genitalia, normal urethral meatus, vaginal atrophy noted, and normal cervix  MS: no gross musculoskeletal defects noted, no edema  SKIN: no suspicious lesions or rashes  NEURO: Normal strength and tone, mentation intact and speech normal  PSYCH: mentation appears normal, affect normal/bright    ASSESSMENT/PLAN:   (Z00.00) Routine general medical examination at a health care facility  (primary encounter diagnosis)  Comment:   Plan: Lipid panel reflex to direct LDL Non-fasting,         Comprehensive metabolic panel            (Z12.11) Screen for colon cancer  Comment:   Plan: COLOGUARD([employer])            (Z12.4) Cervical cancer screening  Comment:   Plan: Pap Screen with HPV - recommended age 30 - 65         years            (F32.5) Major depressive disorder with single episode, in full remission (H)  Comment:   Plan: sertraline (ZOLOFT) 100 MG tablet        Stable. Refilled meds. Continue same dose.     (J31.0) Nonallergic rhinitis  Comment:   Plan: azelastine (ASTELIN) 0.1 % nasal spray        Refilled. continued as needed.     (R11.2,  T50.298A) Drug-induced nausea and " vomiting  Comment:   Plan: ondansetron (ZOFRAN) 8 MG tablet        Prn use with the doxycycline as needed. Take with food.             COUNSELING:  Reviewed preventive health counseling, as reflected in patient instructions       Regular exercise       Healthy diet/nutrition       Colorectal Cancer Screening        She reports that she quit smoking about 23 years ago. Her smoking use included cigarettes. She started smoking about 41 years ago. She has never used smokeless tobacco.          Wendy Calderón PA-C  [facility]  Answers for HPI/ROS submitted by the patient on 7/18/2023  If you checked off any problems, how difficult have these problems made it for you to do your work, take care of things at home, or get along with other people?: Somewhat difficult  PHQ9 TOTAL SCORE: 6

## 2023-07-19 NOTE — RESULT ENCOUNTER NOTE
Rosalio Sheriff,     Your labs are all stable. Your LDL cholesterol did increase slightly, but your HDL (good cholesterol) is amazing. No concerns.   Wendy Calderón PA-C

## 2023-07-21 LAB
BKR LAB AP GYN ADEQUACY: NORMAL
BKR LAB AP GYN INTERPRETATION: NORMAL
BKR LAB AP HPV REFLEX: NORMAL
BKR LAB AP PREVIOUS ABNORMAL: NORMAL
PATH REPORT.COMMENTS IMP SPEC: NORMAL
PATH REPORT.COMMENTS IMP SPEC: NORMAL
PATH REPORT.RELEVANT HX SPEC: NORMAL

## 2023-07-24 LAB
HUMAN PAPILLOMA VIRUS 16 DNA: NEGATIVE
HUMAN PAPILLOMA VIRUS 18 DNA: NEGATIVE
HUMAN PAPILLOMA VIRUS FINAL DIAGNOSIS: NORMAL
HUMAN PAPILLOMA VIRUS OTHER HR: NEGATIVE

## 2023-10-11 ENCOUNTER — MEDICAL CORRESPONDENCE (OUTPATIENT)
Dept: FAMILY MEDICINE | Facility: CLINIC | Age: 58
End: 2023-10-11
Payer: COMMERCIAL

## 2023-10-30 DIAGNOSIS — J31.0 NONALLERGIC RHINITIS: ICD-10-CM

## 2023-10-30 RX ORDER — AZELASTINE HYDROCHLORIDE 137 UG/1
SPRAY, METERED NASAL
Qty: 30 ML | Refills: 0 | Status: SHIPPED | OUTPATIENT
Start: 2023-10-30 | End: 2024-02-26

## 2024-04-08 ENCOUNTER — APPOINTMENT (OUTPATIENT)
Dept: URBAN - METROPOLITAN AREA CLINIC 193 | Age: 59
Setting detail: DERMATOLOGY
End: 2024-04-10

## 2024-04-08 DIAGNOSIS — L82.1 OTHER SEBORRHEIC KERATOSIS: ICD-10-CM

## 2024-04-08 DIAGNOSIS — L81.4 OTHER MELANIN HYPERPIGMENTATION: ICD-10-CM

## 2024-04-08 DIAGNOSIS — D22 MELANOCYTIC NEVI: ICD-10-CM

## 2024-04-08 DIAGNOSIS — Z71.89 OTHER SPECIFIED COUNSELING: ICD-10-CM

## 2024-04-08 DIAGNOSIS — L57.8 OTHER SKIN CHANGES DUE TO CHRONIC EXPOSURE TO NONIONIZING RADIATION: ICD-10-CM

## 2024-04-08 DIAGNOSIS — L72.8 OTHER FOLLICULAR CYSTS OF THE SKIN AND SUBCUTANEOUS TISSUE: ICD-10-CM

## 2024-04-08 PROBLEM — D22.61 MELANOCYTIC NEVI OF RIGHT UPPER LIMB, INCLUDING SHOULDER: Status: ACTIVE | Noted: 2024-04-08

## 2024-04-08 PROCEDURE — OTHER PRESCRIPTION: OTHER

## 2024-04-08 PROCEDURE — OTHER MIPS QUALITY: OTHER

## 2024-04-08 PROCEDURE — OTHER COUNSELING: OTHER

## 2024-04-08 PROCEDURE — 99213 OFFICE O/P EST LOW 20 MIN: CPT

## 2024-04-08 RX ORDER — HYP AC/SOD CHL/SOD SUL/SOD PHO 0.009 %
SPRAY, NON-AEROSOL (ML) TOPICAL BID
Qty: 28 | Refills: 3 | Status: ERX | COMMUNITY
Start: 2024-04-08

## 2024-04-08 RX ORDER — CLINDAMYCIN PHOSPHATE 10 MG/G
GEL TOPICAL DAILY
Qty: 60 | Refills: 2 | Status: ERX | COMMUNITY
Start: 2024-04-08

## 2024-04-08 ASSESSMENT — LOCATION DETAILED DESCRIPTION DERM
LOCATION DETAILED: LEFT DISTAL PRETIBIAL REGION
LOCATION DETAILED: LEFT ANTERIOR PROXIMAL THIGH
LOCATION DETAILED: RIGHT ANTERIOR PROXIMAL THIGH
LOCATION DETAILED: LEFT PROXIMAL PRETIBIAL REGION
LOCATION DETAILED: LEFT INFERIOR ANTERIOR NECK
LOCATION DETAILED: RIGHT ANTERIOR PROXIMAL UPPER ARM
LOCATION DETAILED: LEFT MEDIAL SUPERIOR CHEST

## 2024-04-08 ASSESSMENT — LOCATION SIMPLE DESCRIPTION DERM
LOCATION SIMPLE: CHEST
LOCATION SIMPLE: LEFT PRETIBIAL REGION
LOCATION SIMPLE: RIGHT UPPER ARM
LOCATION SIMPLE: LEFT ANTERIOR NECK
LOCATION SIMPLE: LEFT THIGH
LOCATION SIMPLE: RIGHT THIGH

## 2024-04-08 ASSESSMENT — LOCATION ZONE DERM
LOCATION ZONE: NECK
LOCATION ZONE: ARM
LOCATION ZONE: LEG
LOCATION ZONE: TRUNK

## 2024-04-08 NOTE — PROCEDURE: COUNSELING
Detail Level: Generalized
Detail Level: Detailed
Patient Specific Counseling (Will Not Stick From Patient To Patient): Clindamycin Gel BID
Patient Specific Counseling (Will Not Stick From Patient To Patient): Start Celacyn Gel BID

## 2024-04-29 DIAGNOSIS — F32.5 MAJOR DEPRESSIVE DISORDER WITH SINGLE EPISODE, IN FULL REMISSION (H): ICD-10-CM

## 2024-04-29 RX ORDER — SERTRALINE HYDROCHLORIDE 100 MG/1
100 TABLET, FILM COATED ORAL DAILY
Qty: 90 TABLET | Refills: 0 | Status: SHIPPED | OUTPATIENT
Start: 2024-04-29 | End: 2024-07-08

## 2024-06-12 LAB — NONINV COLON CA DNA+OCC BLD SCRN STL QL: NEGATIVE

## 2024-07-08 DIAGNOSIS — F32.5 MAJOR DEPRESSIVE DISORDER WITH SINGLE EPISODE, IN FULL REMISSION (H): ICD-10-CM

## 2024-07-08 RX ORDER — SERTRALINE HYDROCHLORIDE 100 MG/1
100 TABLET, FILM COATED ORAL DAILY
Qty: 90 TABLET | Refills: 0 | Status: SHIPPED | OUTPATIENT
Start: 2024-07-08 | End: 2024-08-27

## 2024-07-22 ASSESSMENT — PATIENT HEALTH QUESTIONNAIRE - PHQ9
SUM OF ALL RESPONSES TO PHQ QUESTIONS 1-9: 13
10. IF YOU CHECKED OFF ANY PROBLEMS, HOW DIFFICULT HAVE THESE PROBLEMS MADE IT FOR YOU TO DO YOUR WORK, TAKE CARE OF THINGS AT HOME, OR GET ALONG WITH OTHER PEOPLE: SOMEWHAT DIFFICULT
SUM OF ALL RESPONSES TO PHQ QUESTIONS 1-9: 13

## 2024-08-27 ENCOUNTER — OFFICE VISIT (OUTPATIENT)
Dept: FAMILY MEDICINE | Facility: CLINIC | Age: 59
End: 2024-08-27
Payer: COMMERCIAL

## 2024-08-27 VITALS
RESPIRATION RATE: 20 BRPM | BODY MASS INDEX: 19.85 KG/M2 | SYSTOLIC BLOOD PRESSURE: 100 MMHG | DIASTOLIC BLOOD PRESSURE: 63 MMHG | HEART RATE: 70 BPM | HEIGHT: 69 IN | WEIGHT: 134 LBS | OXYGEN SATURATION: 98 % | TEMPERATURE: 97.4 F

## 2024-08-27 DIAGNOSIS — J31.0 NONALLERGIC RHINITIS: Primary | ICD-10-CM

## 2024-08-27 DIAGNOSIS — Z12.31 ENCOUNTER FOR SCREENING MAMMOGRAM FOR MALIGNANT NEOPLASM OF BREAST: ICD-10-CM

## 2024-08-27 DIAGNOSIS — R53.83 OTHER FATIGUE: ICD-10-CM

## 2024-08-27 DIAGNOSIS — F32.5 MAJOR DEPRESSIVE DISORDER WITH SINGLE EPISODE, IN FULL REMISSION (H): ICD-10-CM

## 2024-08-27 DIAGNOSIS — L30.1 DYSHIDROTIC ECZEMA: ICD-10-CM

## 2024-08-27 LAB
ALBUMIN SERPL BCG-MCNC: 4.3 G/DL (ref 3.5–5.2)
ALP SERPL-CCNC: 72 U/L (ref 40–150)
ALT SERPL W P-5'-P-CCNC: 38 U/L (ref 0–50)
ANION GAP SERPL CALCULATED.3IONS-SCNC: 8 MMOL/L (ref 7–15)
AST SERPL W P-5'-P-CCNC: 43 U/L (ref 0–45)
BASOPHILS # BLD AUTO: 0 10E3/UL (ref 0–0.2)
BASOPHILS NFR BLD AUTO: 1 %
BILIRUB SERPL-MCNC: 0.2 MG/DL
BUN SERPL-MCNC: 15.1 MG/DL (ref 6–20)
CALCIUM SERPL-MCNC: 9.4 MG/DL (ref 8.8–10.4)
CHLORIDE SERPL-SCNC: 99 MMOL/L (ref 98–107)
CREAT SERPL-MCNC: 0.9 MG/DL (ref 0.51–0.95)
EGFRCR SERPLBLD CKD-EPI 2021: 74 ML/MIN/1.73M2
EOSINOPHIL # BLD AUTO: 0 10E3/UL (ref 0–0.7)
EOSINOPHIL NFR BLD AUTO: 1 %
ERYTHROCYTE [DISTWIDTH] IN BLOOD BY AUTOMATED COUNT: 12.7 % (ref 10–15)
FERRITIN SERPL-MCNC: 55 NG/ML (ref 11–328)
GLUCOSE SERPL-MCNC: 81 MG/DL (ref 70–99)
HBA1C MFR BLD: 5.1 % (ref 0–5.6)
HCO3 SERPL-SCNC: 28 MMOL/L (ref 22–29)
HCT VFR BLD AUTO: 37.9 % (ref 35–47)
HGB BLD-MCNC: 12.6 G/DL (ref 11.7–15.7)
IMM GRANULOCYTES # BLD: 0 10E3/UL
IMM GRANULOCYTES NFR BLD: 0 %
LYMPHOCYTES # BLD AUTO: 1.4 10E3/UL (ref 0.8–5.3)
LYMPHOCYTES NFR BLD AUTO: 30 %
MCH RBC QN AUTO: 30.6 PG (ref 26.5–33)
MCHC RBC AUTO-ENTMCNC: 33.2 G/DL (ref 31.5–36.5)
MCV RBC AUTO: 92 FL (ref 78–100)
MONOCYTES # BLD AUTO: 0.4 10E3/UL (ref 0–1.3)
MONOCYTES NFR BLD AUTO: 9 %
NEUTROPHILS # BLD AUTO: 2.7 10E3/UL (ref 1.6–8.3)
NEUTROPHILS NFR BLD AUTO: 60 %
PLATELET # BLD AUTO: 198 10E3/UL (ref 150–450)
POTASSIUM SERPL-SCNC: 4.6 MMOL/L (ref 3.4–5.3)
PROT SERPL-MCNC: 6.5 G/DL (ref 6.4–8.3)
RBC # BLD AUTO: 4.12 10E6/UL (ref 3.8–5.2)
SODIUM SERPL-SCNC: 135 MMOL/L (ref 135–145)
TSH SERPL DL<=0.005 MIU/L-ACNC: 1.17 UIU/ML (ref 0.3–4.2)
VIT B12 SERPL-MCNC: 1084 PG/ML (ref 232–1245)
VIT D+METAB SERPL-MCNC: 48 NG/ML (ref 20–50)
WBC # BLD AUTO: 4.5 10E3/UL (ref 4–11)

## 2024-08-27 PROCEDURE — 99214 OFFICE O/P EST MOD 30 MIN: CPT | Performed by: PHYSICIAN ASSISTANT

## 2024-08-27 PROCEDURE — 36415 COLL VENOUS BLD VENIPUNCTURE: CPT | Performed by: PHYSICIAN ASSISTANT

## 2024-08-27 PROCEDURE — 82607 VITAMIN B-12: CPT | Performed by: PHYSICIAN ASSISTANT

## 2024-08-27 PROCEDURE — 80053 COMPREHEN METABOLIC PANEL: CPT | Performed by: PHYSICIAN ASSISTANT

## 2024-08-27 PROCEDURE — 82728 ASSAY OF FERRITIN: CPT | Performed by: PHYSICIAN ASSISTANT

## 2024-08-27 PROCEDURE — 85025 COMPLETE CBC W/AUTO DIFF WBC: CPT | Performed by: PHYSICIAN ASSISTANT

## 2024-08-27 PROCEDURE — 82306 VITAMIN D 25 HYDROXY: CPT | Performed by: PHYSICIAN ASSISTANT

## 2024-08-27 PROCEDURE — G2211 COMPLEX E/M VISIT ADD ON: HCPCS | Performed by: PHYSICIAN ASSISTANT

## 2024-08-27 PROCEDURE — 84443 ASSAY THYROID STIM HORMONE: CPT | Performed by: PHYSICIAN ASSISTANT

## 2024-08-27 PROCEDURE — 83036 HEMOGLOBIN GLYCOSYLATED A1C: CPT | Performed by: PHYSICIAN ASSISTANT

## 2024-08-27 RX ORDER — AZELASTINE HYDROCHLORIDE 137 UG/1
1-2 SPRAY, METERED NASAL 2 TIMES DAILY PRN
Qty: 30 ML | Refills: 3 | Status: SHIPPED | OUTPATIENT
Start: 2024-08-27 | End: 2024-08-28

## 2024-08-27 RX ORDER — TRIAMCINOLONE ACETONIDE 5 MG/G
OINTMENT TOPICAL 2 TIMES DAILY
Status: CANCELLED | OUTPATIENT
Start: 2024-08-27

## 2024-08-27 RX ORDER — TRIAMCINOLONE ACETONIDE 5 MG/G
OINTMENT TOPICAL
Qty: 15 G | Refills: 2 | Status: SHIPPED | OUTPATIENT
Start: 2024-08-27 | End: 2024-08-28

## 2024-08-27 RX ORDER — SERTRALINE HYDROCHLORIDE 100 MG/1
100 TABLET, FILM COATED ORAL DAILY
Qty: 90 TABLET | Refills: 3 | Status: SHIPPED | OUTPATIENT
Start: 2024-08-27

## 2024-08-27 NOTE — PROGRESS NOTES
Assessment & Plan       Nonallergic rhinitis  Refilled.   - azelastine 137 MCG/SPRAY SOLN; [place] 1-2 sprays into both nostrils 2 times daily as needed (nasal congetsion).    Major depressive disorder with single episode, in full remission (H24)  Worsening slightly, but patient declines to change medications at this time. Will continue on same does and she will reach out if needed.   - sertraline (ZOLOFT) 100 MG tablet; Take 1 tablet (100 mg) by mouth daily.    Other fatigue  Basic labs to rule out underlying cause of fatigue.   - TSH WITH FREE T4 REFLEX; Future  - Comprehensive metabolic panel; Future  - CBC with Platelets & Differential; Future  - Hemoglobin A1c; Future  - Vitamin D Deficiency; Future  - Vitamin B12; Future  - Ferritin; Future  - TSH WITH FREE T4 REFLEX  - Comprehensive metabolic panel  - CBC with Platelets & Differential  - Hemoglobin A1c  - Vitamin D Deficiency  - Vitamin B12  - Ferritin    Encounter for screening mammogram for malignant neoplasm of breast  - MA Screening Bilateral w/ Trung; Future    Dyshidrotic eczema  Refilled for prn use.   - triamcinolone (KENALOG) 0.5 % external ointment; Apply to affected area twice per day as needed.      The longitudinal plan of care for the diagnosis(es)/condition(s) as documented were addressed during this visit. Due to the added complexity in care, I will continue to support Sharita in the subsequent management and with ongoing continuity of care.          Subjective   Sharita is a 58 year old, presenting for the following health issues:  Recheck Medication        8/27/2024     1:46 PM   Additional Questions   Roomed by inez BARTHOLOMEW       Depression   How are you doing with your depression since your last visit? Worsened   Are you having other symptoms that might be associated with depression? No  Have you had a significant life event?  OTHER: moved back to [place]    Are you feeling anxious or having panic attacks?   No  Do you have any concerns with your  "use of alcohol or other drugs? No    Social History     Tobacco Use    Smoking status: Former     Current packs/day: 0.00     Types: Cigarettes     Start date: 1982     Quit date: 2000     Years since quittin.3     Passive exposure: Past    Smokeless tobacco: Never    Tobacco comments:     I smoked 3 cigarettes or fewer a day, but not during pregnancy.   Vaping Use    Vaping status: Never Used   Substance Use Topics    Alcohol use: Yes     Comment: once in a while in the summer    Drug use: No         1/3/2022    11:05 AM 2023     8:44 AM 2024     2:00 PM   PHQ   PHQ-9 Total Score 6 6 13    13   Q9: Thoughts of better off dead/self-harm past 2 weeks Not at all Not at all Not at all         2016     2:45 PM 2017     2:51 PM 2017     3:33 PM   LUDIN-7 SCORE   Total Score 18 10 9         Suicide Assessment Five-step Evaluation and Treatment (SAFE-T)        How many servings of fruits and vegetables do you eat daily?  2-3  On average, how many sweetened beverages do you drink each day (Examples: soda, juice, sweet tea, etc.  Do NOT count diet or artificially sweetened beverages)?   0  How many days per week do you exercise enough to make your heart beat faster? 7  How many minutes a day do you exercise enough to make your heart beat faster? 30 - 60  How many days per week do you miss taking your medication? 0        Feels chronically fatigued.   Working 12 hour day 4 days per week. Hates her job.   A little depressed.   Has gained weight.               Objective    /63 (BP Location: Left arm, Patient Position: Chair, Cuff Size: Adult Regular)   Pulse 70   Temp 97.4  F (36.3  C) (Temporal)   Resp 20   Ht 1.74 m (5' 8.5\")   Wt 60.8 kg (134 lb)   LMP 2014   SpO2 98%   BMI 20.08 kg/m    Body mass index is 20.08 kg/m .  Physical Exam   GENERAL: alert and no distress  RESP: lungs clear to auscultation - no rales, rhonchi or wheezes  CV: regular rate and rhythm, normal " S1 S2, no S3 or S4, no murmur, click or rub, no peripheral edema   PSYCH: mentation appears normal, affect flat affect            Signed Electronically by: Wendy Calderón PA-C

## 2024-08-28 RX ORDER — AZELASTINE HYDROCHLORIDE 137 UG/1
1-2 SPRAY, METERED NASAL 2 TIMES DAILY PRN
Qty: 90 ML | Refills: 3 | Status: SHIPPED | OUTPATIENT
Start: 2024-08-28

## 2024-08-28 RX ORDER — TRIAMCINOLONE ACETONIDE 5 MG/G
OINTMENT TOPICAL
Qty: 45 G | Refills: 2 | Status: SHIPPED | OUTPATIENT
Start: 2024-08-28

## 2024-08-29 ENCOUNTER — MYC MEDICAL ADVICE (OUTPATIENT)
Dept: FAMILY MEDICINE | Facility: CLINIC | Age: 59
End: 2024-08-29
Payer: COMMERCIAL

## 2024-08-29 DIAGNOSIS — L30.1 DYSHIDROTIC ECZEMA: Primary | ICD-10-CM

## 2024-08-29 NOTE — TELEPHONE ENCOUNTER
Routing to PCP    Patient was seen on 8/28 regarding her eczema    She was prescribed the kenalog ointment, but would prefer the cream version as the ointment gets on her clothes    Willing to send a new rx for this? Is there a cream version at this strength?    Theresa Valadez RN

## 2024-08-30 RX ORDER — TRIAMCINOLONE ACETONIDE 5 MG/G
CREAM TOPICAL 2 TIMES DAILY
Qty: 15 G | Refills: 0 | Status: SHIPPED | OUTPATIENT
Start: 2024-08-30

## 2024-09-01 ENCOUNTER — HEALTH MAINTENANCE LETTER (OUTPATIENT)
Age: 59
End: 2024-09-01

## 2024-09-30 ENCOUNTER — OFFICE VISIT (OUTPATIENT)
Dept: FAMILY MEDICINE | Facility: CLINIC | Age: 59
End: 2024-09-30
Payer: COMMERCIAL

## 2024-09-30 VITALS
RESPIRATION RATE: 14 BRPM | BODY MASS INDEX: 19.73 KG/M2 | TEMPERATURE: 98.1 F | DIASTOLIC BLOOD PRESSURE: 58 MMHG | OXYGEN SATURATION: 97 % | WEIGHT: 133.2 LBS | SYSTOLIC BLOOD PRESSURE: 93 MMHG | HEART RATE: 70 BPM | HEIGHT: 69 IN

## 2024-09-30 DIAGNOSIS — R19.7 DIARRHEA, UNSPECIFIED TYPE: Primary | ICD-10-CM

## 2024-09-30 PROCEDURE — 87507 IADNA-DNA/RNA PROBE TQ 12-25: CPT | Performed by: NURSE PRACTITIONER

## 2024-09-30 PROCEDURE — 87493 C DIFF AMPLIFIED PROBE: CPT | Performed by: NURSE PRACTITIONER

## 2024-09-30 PROCEDURE — 99213 OFFICE O/P EST LOW 20 MIN: CPT | Performed by: NURSE PRACTITIONER

## 2024-09-30 ASSESSMENT — ANXIETY QUESTIONNAIRES
7. FEELING AFRAID AS IF SOMETHING AWFUL MIGHT HAPPEN: NOT AT ALL
GAD7 TOTAL SCORE: 2
1. FEELING NERVOUS, ANXIOUS, OR ON EDGE: NOT AT ALL
2. NOT BEING ABLE TO STOP OR CONTROL WORRYING: NOT AT ALL
GAD7 TOTAL SCORE: 2
IF YOU CHECKED OFF ANY PROBLEMS ON THIS QUESTIONNAIRE, HOW DIFFICULT HAVE THESE PROBLEMS MADE IT FOR YOU TO DO YOUR WORK, TAKE CARE OF THINGS AT HOME, OR GET ALONG WITH OTHER PEOPLE: NOT DIFFICULT AT ALL
7. FEELING AFRAID AS IF SOMETHING AWFUL MIGHT HAPPEN: NOT AT ALL
3. WORRYING TOO MUCH ABOUT DIFFERENT THINGS: NOT AT ALL
5. BEING SO RESTLESS THAT IT IS HARD TO SIT STILL: NOT AT ALL
4. TROUBLE RELAXING: NOT AT ALL
6. BECOMING EASILY ANNOYED OR IRRITABLE: MORE THAN HALF THE DAYS
8. IF YOU CHECKED OFF ANY PROBLEMS, HOW DIFFICULT HAVE THESE MADE IT FOR YOU TO DO YOUR WORK, TAKE CARE OF THINGS AT HOME, OR GET ALONG WITH OTHER PEOPLE?: NOT DIFFICULT AT ALL
GAD7 TOTAL SCORE: 2

## 2024-09-30 ASSESSMENT — PATIENT HEALTH QUESTIONNAIRE - PHQ9
SUM OF ALL RESPONSES TO PHQ QUESTIONS 1-9: 13
SUM OF ALL RESPONSES TO PHQ QUESTIONS 1-9: 13
10. IF YOU CHECKED OFF ANY PROBLEMS, HOW DIFFICULT HAVE THESE PROBLEMS MADE IT FOR YOU TO DO YOUR WORK, TAKE CARE OF THINGS AT HOME, OR GET ALONG WITH OTHER PEOPLE: SOMEWHAT DIFFICULT

## 2024-09-30 ASSESSMENT — PAIN SCALES - GENERAL: PAINLEVEL: NO PAIN (0)

## 2024-09-30 NOTE — PATIENT INSTRUCTIONS
Diarrhea:  Follow a BRATY diet (bananas, rice, apples, toast, yogurt) until diarrhea resolves.   Stay hydrated with water, gatorade, vitamin water, etc.  Avoid dairy products and spicy foods.   Stool samples ordered today. We'll call you with those results.   Follow-up if symptoms do not improve after another week.

## 2024-09-30 NOTE — PROGRESS NOTES
Assessment & Plan     Diarrhea, unspecified type  Likely viral in nature as it seems to be improving. She is concerned about a hx of E. Coli infection (questioned whether this was actually C-diff, but she says it was E.Coli). Will check bacteria/virus panel and c-diff. Recommended BRATY diet. She already avoids dairy and spicy foods due to GI intolerance. Follow-up as needed if symptoms do not improve.     - Enteric Bacteria and Virus Panel by [facility]; Future  - C. difficile Toxin B PCR with reflex to C. difficile Antigen and Toxins A/B EIA; Future            Patient Instructions   Diarrhea:  Follow a BRATY diet (bananas, rice, apples, toast, yogurt) until diarrhea resolves.   Stay hydrated with water, gatorade, vitamin water, etc.  Avoid dairy products and spicy foods.   Stool samples ordered today. We'll call you with those results.   Follow-up if symptoms do not improve after another week.       Poncho Sheriff is a 58 year old, presenting for the following health issues:  GI Problem (Wondering if she needs to leave a stool sample due to ongoing stomach gurgling and diarrhea, when it comes is severe and very watery )        9/30/2024     1:57 PM   Additional Questions   Roomed by Christin   Accompanied by none         9/30/2024     1:57 PM   Patient Reported Additional Medications   Patient reports taking the following new medications Probiotic daily the past 4 days      Via the Health Maintenance questionnaire, the patient has reported the following services have been completed -Mammogram: [facility] 2023-06-01, this information has not been sent to the abstraction team.  History of Present Illness       Reason for visit:  GI issues  Symptom onset:  3-7 days ago  Symptoms include:  Intermittent nausea and diarrhea, constant stomach gurgling  Symptom intensity:  Severe  Symptom progression:  Staying the same  Had these symptoms before:  Yes  Has tried/received treatment for these symptoms:   No  What makes it worse:  No  What makes it better:  No   She is taking medications regularly.     Had an elevated temperature of 99 1 week ago  Patient had fatigue, headache, temp up to 100  Patient tested for COVID Wednesday and Thursday of last week, both negative   Is improving but her stomach continues to gurgle, has a history of e-coli in the past and is worried about this         Additional provider notes: Patient presents in clinic for the following:     GI symptoms: started about a week ago with temp/nausea/diarrhea/stomach gurgling. Temp was 100-101 at the highest. Diarrhea started after she took ibuprofen. Negative COVID tests x2. Feels she is improving over all, but her stomach is still making gurgling noises and she has extensive fatigue. Hx of E. Coli in her stool.   -diarrhea is intermittent and is explosive  -she started probiotics on Friday  -no recent abx or travel  -she normally has a BM after each meal (~3 times a day), but recently she has been going 4 times a day and then none.       Allergies   Allergen Reactions    Adhesive Tape     Dairy Digestive      Bloating, diarrhea, and pain    Gluten Meal      Gluten intolerance       Current Outpatient Medications   Medication Sig Dispense Refill    azelastine 137 MCG/SPRAY SOLN [place] 1-2 sprays into both nostrils 2 times daily as needed (nasal congetsion). 90 mL 3    Magnesium 250 MG tablet Take 1 tablet by mouth daily. 100 tablet 3    sertraline (ZOLOFT) 100 MG tablet Take 1 tablet (100 mg) by mouth daily. 90 tablet 3    triamcinolone (ARISTOCORT HP) 0.5 % external cream Apply topically 2 times daily. 15 g 0    triamcinolone (KENALOG) 0.5 % external ointment Apply to affected area twice per day as needed. 45 g 2     No current facility-administered medications for this visit.       Past Medical History:   Diagnosis Date    Allergic rhinitis due to other allergen 2001    Allergies year round, testing positive for mold, dust and boxelder trees.  Did  "3years of shots    Depressive disorder 1982    Depressive disorder, not elsewhere classified     Was Zoloft which stopped working, side effects to celexa, prozac sexual side effects.    DUB (dysfunctional uterine bleeding) 1/2/2013    Epidermoid cyst of skin 1/31/2013     Do you wish to do the replacement in the background? yes      Excessive or frequent menstruation 9/17/2008    Premenstrual tension syndrome 8/18/2006    Tried SSRI and OCPs Problem list name updated by automated process. Provider to review    Vaginal burning 1/2/2013            Review of Systems  Constitutional, HEENT, cardiovascular, pulmonary, gi and gu systems are negative, except as otherwise noted.      Objective    BP 93/58 (BP Location: Right arm, Patient Position: Sitting, Cuff Size: Adult Regular)   Pulse 70   Temp 98.1  F (36.7  C) (Oral)   Resp 14   Ht 1.74 m (5' 8.5\")   Wt 60.4 kg (133 lb 3.2 oz)   LMP 08/13/2014   SpO2 97%   BMI 19.96 kg/m    Body mass index is 19.96 kg/m .  Physical Exam  Vitals reviewed.   Constitutional:       General: She is not in acute distress.     Appearance: Normal appearance. She is not ill-appearing or toxic-appearing.   Abdominal:      General: Abdomen is flat. Bowel sounds are normal. There is no distension.      Palpations: Abdomen is soft. There is no mass.      Tenderness: There is no abdominal tenderness. There is no guarding or rebound.      Hernia: No hernia is present.   Musculoskeletal:         General: Normal range of motion.   Skin:     General: Skin is warm and dry.   Neurological:      Mental Status: She is alert and oriented to person, place, and time.   Psychiatric:         Behavior: Behavior normal.                    Signed Electronically by: Niharika May DNP    "

## 2024-10-01 LAB

## 2024-10-12 ASSESSMENT — PATIENT HEALTH QUESTIONNAIRE - PHQ9: SUM OF ALL RESPONSES TO PHQ QUESTIONS 1-9: 13

## 2024-11-10 ENCOUNTER — HEALTH MAINTENANCE LETTER (OUTPATIENT)
Age: 59
End: 2024-11-10

## 2024-12-16 ENCOUNTER — OFFICE VISIT (OUTPATIENT)
Dept: FAMILY MEDICINE | Facility: CLINIC | Age: 59
End: 2024-12-16
Payer: COMMERCIAL

## 2024-12-16 VITALS
SYSTOLIC BLOOD PRESSURE: 102 MMHG | HEIGHT: 68 IN | HEART RATE: 68 BPM | BODY MASS INDEX: 20.92 KG/M2 | WEIGHT: 138 LBS | OXYGEN SATURATION: 98 % | DIASTOLIC BLOOD PRESSURE: 65 MMHG | RESPIRATION RATE: 20 BRPM | TEMPERATURE: 97.9 F

## 2024-12-16 DIAGNOSIS — Z23 NEED FOR HEPATITIS B VACCINATION: ICD-10-CM

## 2024-12-16 DIAGNOSIS — L30.9 FACIAL DERMATITIS: Primary | ICD-10-CM

## 2024-12-16 PROCEDURE — 87798 DETECT AGENT NOS DNA AMP: CPT | Performed by: FAMILY MEDICINE

## 2024-12-16 PROCEDURE — 90471 IMMUNIZATION ADMIN: CPT | Performed by: FAMILY MEDICINE

## 2024-12-16 PROCEDURE — 90746 HEPB VACCINE 3 DOSE ADULT IM: CPT | Performed by: FAMILY MEDICINE

## 2024-12-16 PROCEDURE — 99214 OFFICE O/P EST MOD 30 MIN: CPT | Mod: 25 | Performed by: FAMILY MEDICINE

## 2024-12-16 RX ORDER — DOXYCYCLINE 100 MG/1
100 CAPSULE ORAL 2 TIMES DAILY
Qty: 20 CAPSULE | Refills: 0 | Status: SHIPPED | OUTPATIENT
Start: 2024-12-16 | End: 2024-12-26

## 2024-12-16 ASSESSMENT — PATIENT HEALTH QUESTIONNAIRE - PHQ9
10. IF YOU CHECKED OFF ANY PROBLEMS, HOW DIFFICULT HAVE THESE PROBLEMS MADE IT FOR YOU TO DO YOUR WORK, TAKE CARE OF THINGS AT HOME, OR GET ALONG WITH OTHER PEOPLE: SOMEWHAT DIFFICULT
SUM OF ALL RESPONSES TO PHQ QUESTIONS 1-9: 11
SUM OF ALL RESPONSES TO PHQ QUESTIONS 1-9: 11

## 2024-12-16 ASSESSMENT — PAIN SCALES - GENERAL: PAINLEVEL_OUTOF10: NO PAIN (0)

## 2024-12-16 NOTE — PROGRESS NOTES
Assessment & Plan     Facial dermatitis  Differential diagnosis discussed included infectious rash (viral, bacterial etc.) management discussed keep area clean, doxycycline prescribed, possible side effect discussed, she will follow-up with her primary if she fail to improve, consider dermatology referral   - doxycycline hyclate (VIBRAMYCIN) 100 MG capsule; Take 1 capsule (100 mg) by mouth 2 times daily for 10 days.  - Varicella Zoster DNA PCR CSF or Skin Swab; Future  - Varicella Zoster DNA PCR CSF or Skin Swab    Need for hepatitis B vaccination    - HEPATITIS B VACCINE (20 YEARS AND OLDER) (ENGERIX-B/RECOMBIVAX); Standing  - HEPATITIS B VACCINE (20 YEARS AND OLDER) (ENGERIX-B/RECOMBIVAX)    Review of external notes as documented elsewhere in note  30 minutes spent by me on the date of the encounter doing chart review, review of outside records, review of test results, interpretation of tests, patient visit, and documentation         Subjective   Sharita is a 59 year old, presenting for the following health issues:  Derm Problem      12/16/2024     1:13 PM   Additional Questions   Roomed by kaye     History of Present Illness       Reason for visit:  Inflamed, irritated, itchy skin on face  Symptom onset:  3-7 days ago  Symptoms include:  Itchy, irritated, inflammed skin  Symptom intensity:  Severe  Symptom progression:  Staying the same  Had these symptoms before:  Yes  Has tried/received treatment for these symptoms:  Yes  Previous treatment was successful:  Yes  Prior treatment description:  Antibiotics  What makes it worse:  Applying face cream  What makes it better:  Diaper rash ointment   She is taking medications regularly.     She is here today with left facial redness and skin irritation, numbness, only on the left side, symptoms started about 5 days ago, she has tried to use diaper rash cream with no improvement she also tried topical steroid with no significant improvement.  initially thought to be  "from eye make-up irritation but cleaning did not seems to help.  no fever no chills.  She received her shingles vaccine x 2, she would like to get her hep B vaccine while she is here today.      Review of Systems  Constitutional, HEENT, cardiovascular, pulmonary, gi and gu systems are negative, except as otherwise noted.      Objective    /65 (BP Location: Right arm, Patient Position: Sitting, Cuff Size: Adult Regular)   Pulse 68   Temp 97.9  F (36.6  C) (Oral)   Resp 20   Ht 1.735 m (5' 8.31\")   Wt 62.6 kg (138 lb)   LMP 08/13/2014   SpO2 98%   BMI 20.79 kg/m    Body mass index is 20.79 kg/m .  Physical Exam   GENERAL: alert and no distress  NECK: no adenopathy, no asymmetry, masses, or scars  RESP: lungs clear to auscultation - no rales, rhonchi or wheezes  CV: regular rate and rhythm, normal S1 S2, no S3 or S4, no murmur, click or rub, no peripheral edema  ABDOMEN: soft, nontender, no hepatosplenomegaly, no masses and bowel sounds normal  MS: no gross musculoskeletal defects noted, no edema  SKIN: Left facial skin appears slightly warm, and erythema of about 3 x 4 cm, no vesicles no pustules.    Results for orders placed or performed in visit on 12/16/24   Varicella Zoster DNA PCR CSF or Skin Swab     Status: None   Result Value Ref Range    Varicella Zoster DNA by PCR Not Detected Not Detected    Varicella Zoster Virus PCR Specimen Type Swab        Prior to immunization administration, verified patients identity using patient s name and date of birth. Please see Immunization Activity for additional information.     Screening Questionnaire for Adult Immunization    Are you sick today?   No   Do you have allergies to medications, food, a vaccine component or latex?   Yes   Have you ever had a serious reaction after receiving a vaccination?   No   Do you have a long-term health problem with heart, lung, kidney, or metabolic disease (e.g., diabetes), asthma, a blood disorder, no spleen, complement " component deficiency, a cochlear implant, or a spinal fluid leak?  Are you on long-term aspirin therapy?   No   Do you have cancer, leukemia, HIV/AIDS, or any other immune system problem?   No   Do you have a parent, brother, or sister with an immune system problem?   No   In the past 3 months, have you taken medications that affect  your immune system, such as prednisone, other steroids, or anticancer drugs; drugs for the treatment of rheumatoid arthritis, Crohn s disease, or psoriasis; or have you had radiation treatments?   No   Have you had a seizure, or a brain or other nervous system problem?   No   During the past year, have you received a transfusion of blood or blood    products, or been given immune (gamma) globulin or antiviral drug?   No   For women: Are you pregnant or is there a chance you could become       pregnant during the next month?   No   Have you received any vaccinations in the past 4 weeks?   No     Immunization questionnaire was positive for at least one answer.  Notified .      Patient instructed to remain in clinic for 15 minutes afterwards, and to report any adverse reactions.     Screening performed by Ingrid Zhang MA on 12/16/2024 at 1:17 PM.       This note was completed in part using a voice recognition software, any grammatical or context distortion are unintentional and inherent to the software.    Signed Electronically by: Galilea Rudolph MD

## 2024-12-17 LAB
SPECIMEN TYPE: NORMAL
VZV DNA SPEC QL NAA+PROBE: NOT DETECTED

## 2025-03-14 ENCOUNTER — MYC MEDICAL ADVICE (OUTPATIENT)
Dept: FAMILY MEDICINE | Facility: CLINIC | Age: 60
End: 2025-03-14
Payer: COMMERCIAL

## 2025-04-11 ENCOUNTER — ANCILLARY PROCEDURE (OUTPATIENT)
Dept: MAMMOGRAPHY | Facility: CLINIC | Age: 60
End: 2025-04-11
Attending: PHYSICIAN ASSISTANT
Payer: COMMERCIAL

## 2025-04-11 DIAGNOSIS — Z12.31 ENCOUNTER FOR SCREENING MAMMOGRAM FOR MALIGNANT NEOPLASM OF BREAST: ICD-10-CM

## 2025-04-11 PROCEDURE — 77067 SCR MAMMO BI INCL CAD: CPT | Mod: TC | Performed by: RADIOLOGY

## 2025-04-11 PROCEDURE — 77063 BREAST TOMOSYNTHESIS BI: CPT | Mod: TC | Performed by: RADIOLOGY

## 2025-05-20 ENCOUNTER — OFFICE VISIT (OUTPATIENT)
Dept: INTERNAL MEDICINE | Facility: CLINIC | Age: 60
End: 2025-05-20
Payer: COMMERCIAL

## 2025-05-20 VITALS
BODY MASS INDEX: 20.67 KG/M2 | HEART RATE: 66 BPM | HEIGHT: 69 IN | TEMPERATURE: 97.6 F | OXYGEN SATURATION: 96 % | SYSTOLIC BLOOD PRESSURE: 94 MMHG | DIASTOLIC BLOOD PRESSURE: 57 MMHG | RESPIRATION RATE: 16 BRPM

## 2025-05-20 DIAGNOSIS — L02.92 BOIL: Primary | ICD-10-CM

## 2025-05-20 DIAGNOSIS — F32.5 MAJOR DEPRESSIVE DISORDER WITH SINGLE EPISODE, IN FULL REMISSION: Chronic | ICD-10-CM

## 2025-05-20 DIAGNOSIS — L30.1 DYSHIDROTIC ECZEMA: ICD-10-CM

## 2025-05-20 PROCEDURE — 3078F DIAST BP <80 MM HG: CPT

## 2025-05-20 PROCEDURE — G2211 COMPLEX E/M VISIT ADD ON: HCPCS

## 2025-05-20 PROCEDURE — 3074F SYST BP LT 130 MM HG: CPT

## 2025-05-20 PROCEDURE — 99214 OFFICE O/P EST MOD 30 MIN: CPT

## 2025-05-20 RX ORDER — DOXYCYCLINE 100 MG/1
100 CAPSULE ORAL 2 TIMES DAILY
Qty: 28 CAPSULE | Refills: 0 | Status: SHIPPED | OUTPATIENT
Start: 2025-05-20

## 2025-05-20 ASSESSMENT — PATIENT HEALTH QUESTIONNAIRE - PHQ9
10. IF YOU CHECKED OFF ANY PROBLEMS, HOW DIFFICULT HAVE THESE PROBLEMS MADE IT FOR YOU TO DO YOUR WORK, TAKE CARE OF THINGS AT HOME, OR GET ALONG WITH OTHER PEOPLE: NOT DIFFICULT AT ALL
SUM OF ALL RESPONSES TO PHQ QUESTIONS 1-9: 2
SUM OF ALL RESPONSES TO PHQ QUESTIONS 1-9: 2

## 2025-05-20 NOTE — PROGRESS NOTES
"  Assessment & Plan     (L02.92) Boil  (primary encounter diagnosis)  Comment: Patient seen in clinic today for boil at base of mouth on right side. Patient has had similar issue in the past. Discussed treating with doxycyline and placing dermatology referral.   Plan: REVIEW OF HEALTH MAINTENANCE PROTOCOL ORDERS,         doxycycline hyclate (VIBRAMYCIN) 100 MG         capsule,   Prescription sent to pharmacy     Adult Dermatology  Referral        Future     ((F32.5) Major depressive disorder with single episode, in full remission  Comment: Chronic, stable.   Plan: No change to current plan of care.     (L30.1) Dyshidrotic eczema  Comment: Chronic, stable.   Plan: No change to current plan of care.    Poncho Sheriff is a 59 year old, presenting for the following health issues:  Cyst (On face x 1 month plus )        5/20/2025     4:36 PM   Additional Questions   Roomed by Valencia BESS     History of Present Illness       Reason for visit:  Cyst on face  Symptoms include:  It won t go away  Symptom intensity:  Moderate  Symptom progression:  Staying the same  Had these symptoms before:  Yes  Has tried/received treatment for these symptoms:  Yes  Previous treatment was successful:  Yes  Prior treatment description:  Injection if steroids i to the cyst, but it wasnt on my face. Angela also take. Doxycycline . I ve  also taken  What makes it worse:  No  What makes it better:  No   She is taking medications regularly.                  Review of Systems  Constitutional, HEENT, cardiovascular, pulmonary, gi and gu systems are negative, except as otherwise noted.      Objective    BP 94/57 (BP Location: Right arm, Patient Position: Sitting, Cuff Size: Adult Regular)   Pulse 66   Temp 97.6  F (36.4  C) (Temporal)   Resp 16   Ht 1.753 m (5' 9\")   LMP 08/13/2014   SpO2 96%   BMI 20.67 kg/m    Body mass index is 20.67 kg/m .  Physical Exam  Constitutional:       Appearance: Normal appearance.   HENT:      Head: " Normocephalic.      Nose: Nose normal. No congestion or rhinorrhea.   Eyes:      General:         Right eye: No discharge.         Left eye: No discharge.      Pupils: Pupils are equal, round, and reactive to light.   Pulmonary:      Effort: Pulmonary effort is normal. No respiratory distress.      Breath sounds: Normal breath sounds. No stridor. No wheezing, rhonchi or rales.   Chest:      Chest wall: No tenderness.   Musculoskeletal:         General: No swelling, tenderness, deformity or signs of injury. Normal range of motion.      Right lower leg: No edema.      Left lower leg: No edema.   Skin:     Findings: Lesion present.   Neurological:      General: No focal deficit present.      Mental Status: She is alert and oriented to person, place, and time.   Psychiatric:         Mood and Affect: Mood normal.         Behavior: Behavior normal.         Thought Content: Thought content normal.         Judgment: Judgment normal.          The longitudinal plan of care for the diagnosis(es)/condition(s) as documented were addressed during this visit. Due to the added complexity in care, I will continue to support Sharita in the subsequent management and with ongoing continuity of care.    Signed Electronically by: CELI Hankins CNP

## 2025-05-21 ENCOUNTER — PATIENT OUTREACH (OUTPATIENT)
Dept: CARE COORDINATION | Facility: CLINIC | Age: 60
End: 2025-05-21
Payer: COMMERCIAL

## 2025-06-02 ENCOUNTER — MYC REFILL (OUTPATIENT)
Dept: INTERNAL MEDICINE | Facility: CLINIC | Age: 60
End: 2025-06-02
Payer: COMMERCIAL

## 2025-06-02 DIAGNOSIS — L02.92 BOIL: ICD-10-CM

## 2025-06-02 RX ORDER — DOXYCYCLINE 100 MG/1
100 CAPSULE ORAL 2 TIMES DAILY
Qty: 28 CAPSULE | Refills: 0 | Status: CANCELLED | OUTPATIENT
Start: 2025-06-02

## 2025-06-03 NOTE — TELEPHONE ENCOUNTER
If doxycyline did not resolve then should be seen in clinic. Cathy is out of clinic this week.   Wendy Calderón PA-C

## 2025-07-07 ENCOUNTER — MYC REFILL (OUTPATIENT)
Dept: FAMILY MEDICINE | Facility: CLINIC | Age: 60
End: 2025-07-07
Payer: COMMERCIAL

## 2025-07-07 DIAGNOSIS — L30.1 DYSHIDROTIC ECZEMA: ICD-10-CM

## 2025-07-07 RX ORDER — TRIAMCINOLONE ACETONIDE 5 MG/G
CREAM TOPICAL 2 TIMES DAILY
Qty: 90 G | Refills: 1 | Status: SHIPPED | OUTPATIENT
Start: 2025-07-07

## 2025-08-05 ENCOUNTER — VIRTUAL VISIT (OUTPATIENT)
Dept: FAMILY MEDICINE | Facility: CLINIC | Age: 60
End: 2025-08-05
Payer: COMMERCIAL

## 2025-08-05 DIAGNOSIS — F32.5 MAJOR DEPRESSIVE DISORDER WITH SINGLE EPISODE, IN FULL REMISSION: ICD-10-CM

## 2025-08-05 DIAGNOSIS — Z12.11 SCREEN FOR COLON CANCER: Primary | ICD-10-CM

## 2025-08-05 PROCEDURE — 98005 SYNCH AUDIO-VIDEO EST LOW 20: CPT | Performed by: PHYSICIAN ASSISTANT

## 2025-08-05 RX ORDER — SERTRALINE HYDROCHLORIDE 100 MG/1
100 TABLET, FILM COATED ORAL DAILY
Qty: 90 TABLET | Refills: 3 | Status: SHIPPED | OUTPATIENT
Start: 2025-08-05

## 2025-08-05 RX ORDER — CLONAZEPAM 0.5 MG/1
.5-1 TABLET ORAL 2 TIMES DAILY PRN
Qty: 10 TABLET | Refills: 0 | Status: SHIPPED | OUTPATIENT
Start: 2025-08-05

## 2025-08-05 ASSESSMENT — PATIENT HEALTH QUESTIONNAIRE - PHQ9: SUM OF ALL RESPONSES TO PHQ QUESTIONS 1-9: 0

## 2025-08-06 ENCOUNTER — MYC MEDICAL ADVICE (OUTPATIENT)
Dept: FAMILY MEDICINE | Facility: CLINIC | Age: 60
End: 2025-08-06
Payer: COMMERCIAL

## 2025-08-06 DIAGNOSIS — J31.0 NONALLERGIC RHINITIS: ICD-10-CM

## 2025-08-06 DIAGNOSIS — F32.5 MAJOR DEPRESSIVE DISORDER WITH SINGLE EPISODE, IN FULL REMISSION: ICD-10-CM

## 2025-08-06 RX ORDER — SERTRALINE HYDROCHLORIDE 100 MG/1
100 TABLET, FILM COATED ORAL DAILY
Qty: 90 TABLET | Refills: 3 | OUTPATIENT
Start: 2025-08-06

## 2025-08-06 RX ORDER — AZELASTINE HYDROCHLORIDE 137 UG/1
1-2 SPRAY, METERED NASAL 2 TIMES DAILY PRN
Qty: 90 ML | Refills: 0 | Status: SHIPPED | OUTPATIENT
Start: 2025-08-06